# Patient Record
Sex: FEMALE | Race: WHITE | NOT HISPANIC OR LATINO | Employment: FULL TIME | URBAN - METROPOLITAN AREA
[De-identification: names, ages, dates, MRNs, and addresses within clinical notes are randomized per-mention and may not be internally consistent; named-entity substitution may affect disease eponyms.]

---

## 2017-01-19 ENCOUNTER — OFFICE VISIT (OUTPATIENT)
Dept: URGENT CARE | Facility: CLINIC | Age: 37
End: 2017-01-19
Payer: COMMERCIAL

## 2017-01-19 PROCEDURE — 93005 ELECTROCARDIOGRAM TRACING: CPT

## 2017-01-19 PROCEDURE — 99214 OFFICE O/P EST MOD 30 MIN: CPT

## 2017-04-21 ENCOUNTER — TRANSCRIBE ORDERS (OUTPATIENT)
Dept: URGENT CARE | Facility: CLINIC | Age: 37
End: 2017-04-21

## 2017-04-21 ENCOUNTER — APPOINTMENT (OUTPATIENT)
Dept: LAB | Facility: CLINIC | Age: 37
End: 2017-04-21
Attending: EMERGENCY MEDICINE

## 2017-04-21 DIAGNOSIS — Z02.1 PHYSICAL EXAM, PRE-EMPLOYMENT: Primary | ICD-10-CM

## 2017-04-21 DIAGNOSIS — Z02.1 PHYSICAL EXAM, PRE-EMPLOYMENT: ICD-10-CM

## 2017-04-21 PROCEDURE — 86480 TB TEST CELL IMMUN MEASURE: CPT

## 2017-04-21 PROCEDURE — 36415 COLL VENOUS BLD VENIPUNCTURE: CPT

## 2017-04-23 LAB
ANNOTATION COMMENT IMP: NORMAL
GAMMA INTERFERON BACKGROUND BLD IA-ACNC: 0.03 IU/ML
M TB IFN-G BLD-IMP: NEGATIVE
M TB IFN-G CD4+ BCKGRND COR BLD-ACNC: 0.03 IU/ML
M TB IFN-G CD4+ T-CELLS BLD-ACNC: 0.06 IU/ML
MITOGEN IGNF BLD-ACNC: >10 IU/ML
QUANTIFERON-TB GOLD IN TUBE: NORMAL
SERVICE CMNT-IMP: NORMAL

## 2017-05-30 ENCOUNTER — TRANSCRIBE ORDERS (OUTPATIENT)
Dept: URGENT CARE | Facility: CLINIC | Age: 37
End: 2017-05-30

## 2017-05-30 ENCOUNTER — HOSPITAL ENCOUNTER (OUTPATIENT)
Dept: RADIOLOGY | Facility: CLINIC | Age: 37
Discharge: HOME | End: 2017-05-30
Payer: COMMERCIAL

## 2017-05-30 DIAGNOSIS — M79.671 RIGHT FOOT PAIN: ICD-10-CM

## 2017-05-30 DIAGNOSIS — M79.672 LEFT FOOT PAIN: Primary | ICD-10-CM

## 2017-05-30 DIAGNOSIS — M79.672 LEFT FOOT PAIN: ICD-10-CM

## 2017-05-30 PROCEDURE — 73630 X-RAY EXAM OF FOOT: CPT

## 2017-05-30 PROCEDURE — 73650 X-RAY EXAM OF HEEL: CPT

## 2017-07-31 ENCOUNTER — APPOINTMENT (OUTPATIENT)
Dept: LAB | Facility: CLINIC | Age: 37
End: 2017-07-31
Payer: COMMERCIAL

## 2017-07-31 ENCOUNTER — TRANSCRIBE ORDERS (OUTPATIENT)
Dept: LAB | Facility: CLINIC | Age: 37
End: 2017-07-31

## 2017-07-31 DIAGNOSIS — Z00.8 HEALTH EXAMINATION IN POPULATION SURVEY: ICD-10-CM

## 2017-07-31 DIAGNOSIS — Z00.8 HEALTH EXAMINATION IN POPULATION SURVEY: Primary | ICD-10-CM

## 2017-07-31 LAB
CHOLEST SERPL-MCNC: 163 MG/DL (ref 50–200)
EST. AVERAGE GLUCOSE BLD GHB EST-MCNC: 105 MG/DL
HBA1C MFR BLD: 5.3 % (ref 4.2–6.3)
HDLC SERPL-MCNC: 46 MG/DL (ref 40–60)
LDLC SERPL CALC-MCNC: 94 MG/DL (ref 0–100)
TRIGL SERPL-MCNC: 114 MG/DL

## 2017-07-31 PROCEDURE — 83036 HEMOGLOBIN GLYCOSYLATED A1C: CPT

## 2017-07-31 PROCEDURE — 80061 LIPID PANEL: CPT

## 2017-07-31 PROCEDURE — 36415 COLL VENOUS BLD VENIPUNCTURE: CPT

## 2017-08-28 ENCOUNTER — ALLSCRIPTS OFFICE VISIT (OUTPATIENT)
Dept: OTHER | Facility: OTHER | Age: 37
End: 2017-08-28

## 2017-08-28 DIAGNOSIS — R60.0 LOCALIZED EDEMA: ICD-10-CM

## 2017-08-28 DIAGNOSIS — K52.9 NONINFECTIVE GASTROENTERITIS AND COLITIS: ICD-10-CM

## 2017-08-30 ENCOUNTER — TRANSCRIBE ORDERS (OUTPATIENT)
Dept: ADMINISTRATIVE | Facility: HOSPITAL | Age: 37
End: 2017-08-30

## 2017-08-30 DIAGNOSIS — J45.990 EXERCISE INDUCED BRONCHOSPASM: Primary | ICD-10-CM

## 2017-09-05 ENCOUNTER — HOSPITAL ENCOUNTER (OUTPATIENT)
Dept: PULMONOLOGY | Facility: HOSPITAL | Age: 37
Discharge: HOME | End: 2017-09-05
Attending: INTERNAL MEDICINE
Payer: COMMERCIAL

## 2017-09-05 ENCOUNTER — TRANSCRIBE ORDERS (OUTPATIENT)
Dept: PULMONOLOGY | Facility: HOSPITAL | Age: 37
End: 2017-09-05

## 2017-09-05 ENCOUNTER — GENERIC CONVERSION - ENCOUNTER (OUTPATIENT)
Dept: OTHER | Facility: OTHER | Age: 37
End: 2017-09-05

## 2017-09-05 ENCOUNTER — APPOINTMENT (OUTPATIENT)
Dept: LAB | Facility: HOSPITAL | Age: 37
End: 2017-09-05
Attending: INTERNAL MEDICINE
Payer: COMMERCIAL

## 2017-09-05 ENCOUNTER — HOSPITAL ENCOUNTER (OUTPATIENT)
Dept: ULTRASOUND IMAGING | Facility: HOSPITAL | Age: 37
Discharge: HOME | End: 2017-09-05
Attending: INTERNAL MEDICINE
Payer: COMMERCIAL

## 2017-09-05 DIAGNOSIS — J45.990 EXERCISE INDUCED BRONCHOSPASM: ICD-10-CM

## 2017-09-05 DIAGNOSIS — K52.9 NONINFECTIVE GASTROENTERITIS AND COLITIS: ICD-10-CM

## 2017-09-05 DIAGNOSIS — R60.0 LOCALIZED EDEMA: ICD-10-CM

## 2017-09-05 DIAGNOSIS — J45.990 EXERCISE INDUCED BRONCHOSPASM: Primary | ICD-10-CM

## 2017-09-05 LAB
ALBUMIN SERPL BCP-MCNC: 4.3 G/DL (ref 3.5–5)
ALP SERPL-CCNC: 68 U/L (ref 46–116)
ALT SERPL W P-5'-P-CCNC: 20 U/L (ref 12–78)
ANION GAP SERPL CALCULATED.3IONS-SCNC: 9 MMOL/L (ref 4–13)
AST SERPL W P-5'-P-CCNC: 16 U/L (ref 5–45)
BASOPHILS # BLD AUTO: 0.02 THOUSANDS/ΜL (ref 0–0.1)
BASOPHILS NFR BLD AUTO: 0 % (ref 0–1)
BILIRUB SERPL-MCNC: 1.2 MG/DL (ref 0.2–1)
BUN SERPL-MCNC: 7 MG/DL (ref 5–25)
CALCIUM SERPL-MCNC: 8.7 MG/DL (ref 8.3–10.1)
CHLORIDE SERPL-SCNC: 102 MMOL/L (ref 100–108)
CO2 SERPL-SCNC: 26 MMOL/L (ref 21–32)
CREAT SERPL-MCNC: 0.78 MG/DL (ref 0.6–1.3)
DEPRECATED D DIMER PPP: <270 NG/ML (FEU) (ref 0–424)
EOSINOPHIL # BLD AUTO: 0.08 THOUSAND/ΜL (ref 0–0.61)
EOSINOPHIL NFR BLD AUTO: 1 % (ref 0–6)
ERYTHROCYTE [DISTWIDTH] IN BLOOD BY AUTOMATED COUNT: 12.9 % (ref 11.6–15.1)
GFR SERPL CREATININE-BSD FRML MDRD: 99 ML/MIN/1.73SQ M
GLUCOSE SERPL-MCNC: 98 MG/DL (ref 65–140)
HCT VFR BLD AUTO: 42.4 % (ref 34.8–46.1)
HGB BLD-MCNC: 14.3 G/DL (ref 11.5–15.4)
LYMPHOCYTES # BLD AUTO: 2.74 THOUSANDS/ΜL (ref 0.6–4.47)
LYMPHOCYTES NFR BLD AUTO: 24 % (ref 14–44)
MCH RBC QN AUTO: 30.5 PG (ref 26.8–34.3)
MCHC RBC AUTO-ENTMCNC: 33.7 G/DL (ref 31.4–37.4)
MCV RBC AUTO: 90 FL (ref 82–98)
MONOCYTES # BLD AUTO: 0.97 THOUSAND/ΜL (ref 0.17–1.22)
MONOCYTES NFR BLD AUTO: 8 % (ref 4–12)
NEUTROPHILS # BLD AUTO: 7.83 THOUSANDS/ΜL (ref 1.85–7.62)
NEUTS SEG NFR BLD AUTO: 67 % (ref 43–75)
PLATELET # BLD AUTO: 212 THOUSANDS/UL (ref 149–390)
PMV BLD AUTO: 9.4 FL (ref 8.9–12.7)
POTASSIUM SERPL-SCNC: 4.3 MMOL/L (ref 3.5–5.3)
PROT SERPL-MCNC: 6.9 G/DL (ref 6.4–8.2)
RBC # BLD AUTO: 4.69 MILLION/UL (ref 3.81–5.12)
SODIUM SERPL-SCNC: 137 MMOL/L (ref 136–145)
TSH SERPL DL<=0.05 MIU/L-ACNC: 2.01 UIU/ML (ref 0.36–3.74)
WBC # BLD AUTO: 11.64 THOUSAND/UL (ref 4.31–10.16)

## 2017-09-05 PROCEDURE — 80053 COMPREHEN METABOLIC PANEL: CPT

## 2017-09-05 PROCEDURE — 94760 N-INVAS EAR/PLS OXIMETRY 1: CPT

## 2017-09-05 PROCEDURE — 82784 ASSAY IGA/IGD/IGG/IGM EACH: CPT

## 2017-09-05 PROCEDURE — 94010 BREATHING CAPACITY TEST: CPT

## 2017-09-05 PROCEDURE — 76830 TRANSVAGINAL US NON-OB: CPT

## 2017-09-05 PROCEDURE — 83516 IMMUNOASSAY NONANTIBODY: CPT

## 2017-09-05 PROCEDURE — 85025 COMPLETE CBC W/AUTO DIFF WBC: CPT

## 2017-09-05 PROCEDURE — 36415 COLL VENOUS BLD VENIPUNCTURE: CPT

## 2017-09-05 PROCEDURE — 86255 FLUORESCENT ANTIBODY SCREEN: CPT

## 2017-09-05 PROCEDURE — 76856 US EXAM PELVIC COMPLETE: CPT

## 2017-09-05 PROCEDURE — 84443 ASSAY THYROID STIM HORMONE: CPT

## 2017-09-05 PROCEDURE — 85379 FIBRIN DEGRADATION QUANT: CPT

## 2017-09-05 RX ORDER — ALBUTEROL SULFATE 2.5 MG/3ML
2.5 SOLUTION RESPIRATORY (INHALATION) ONCE AS NEEDED
Status: DISCONTINUED | OUTPATIENT
Start: 2017-09-05 | End: 2017-09-09 | Stop reason: HOSPADM

## 2017-09-06 ENCOUNTER — GENERIC CONVERSION - ENCOUNTER (OUTPATIENT)
Dept: OTHER | Facility: OTHER | Age: 37
End: 2017-09-06

## 2017-09-06 LAB
ENDOMYSIUM IGA SER QL: NEGATIVE
GLIADIN PEPTIDE IGA SER-ACNC: 4 UNITS (ref 0–19)
GLIADIN PEPTIDE IGG SER-ACNC: 2 UNITS (ref 0–19)
IGA SERPL-MCNC: 219 MG/DL (ref 87–352)
TTG IGA SER-ACNC: <2 U/ML (ref 0–3)
TTG IGG SER-ACNC: <2 U/ML (ref 0–5)

## 2017-09-07 ENCOUNTER — GENERIC CONVERSION - ENCOUNTER (OUTPATIENT)
Dept: OTHER | Facility: OTHER | Age: 37
End: 2017-09-07

## 2017-09-15 ENCOUNTER — GENERIC CONVERSION - ENCOUNTER (OUTPATIENT)
Dept: OTHER | Facility: OTHER | Age: 37
End: 2017-09-15

## 2017-09-18 PROCEDURE — G0145 SCR C/V CYTO,THINLAYER,RESCR: HCPCS | Performed by: SPECIALIST

## 2017-09-20 ENCOUNTER — LAB REQUISITION (OUTPATIENT)
Dept: LAB | Facility: HOSPITAL | Age: 37
End: 2017-09-20
Payer: COMMERCIAL

## 2017-09-20 ENCOUNTER — TRANSCRIBE ORDERS (OUTPATIENT)
Dept: ADMINISTRATIVE | Facility: HOSPITAL | Age: 37
End: 2017-09-20

## 2017-09-20 DIAGNOSIS — Z01.419 ENCOUNTER FOR GYNECOLOGICAL EXAMINATION WITHOUT ABNORMAL FINDING: ICD-10-CM

## 2017-09-20 DIAGNOSIS — N83.201 CYST OF RIGHT OVARY: Primary | ICD-10-CM

## 2017-09-25 LAB
LAB AP GYN PRIMARY INTERPRETATION: NORMAL
Lab: NORMAL

## 2017-10-17 ENCOUNTER — HOSPITAL ENCOUNTER (OUTPATIENT)
Dept: ULTRASOUND IMAGING | Facility: HOSPITAL | Age: 37
Discharge: HOME | End: 2017-10-17
Payer: COMMERCIAL

## 2017-10-17 DIAGNOSIS — N83.201 CYST OF RIGHT OVARY: ICD-10-CM

## 2017-10-17 PROCEDURE — 76856 US EXAM PELVIC COMPLETE: CPT

## 2017-10-17 PROCEDURE — 76830 TRANSVAGINAL US NON-OB: CPT

## 2017-11-28 ENCOUNTER — TRANSCRIBE ORDERS (OUTPATIENT)
Dept: LAB | Facility: CLINIC | Age: 37
End: 2017-11-28

## 2017-11-28 ENCOUNTER — APPOINTMENT (OUTPATIENT)
Dept: LAB | Facility: CLINIC | Age: 37
End: 2017-11-28
Payer: COMMERCIAL

## 2017-11-28 DIAGNOSIS — Z31.41 FERTILITY TESTING: ICD-10-CM

## 2017-11-28 DIAGNOSIS — E07.9 DISORDER OF THYROID GLAND: ICD-10-CM

## 2017-11-28 DIAGNOSIS — E34.9: ICD-10-CM

## 2017-11-28 DIAGNOSIS — Z11.59 SCREENING FOR VIRAL DISEASE: ICD-10-CM

## 2017-11-28 DIAGNOSIS — Z11.8 SCREENING EXAMINATION FOR PARASITIC INFECTION: ICD-10-CM

## 2017-11-28 DIAGNOSIS — D64.9 ANEMIA, UNSPECIFIED TYPE: ICD-10-CM

## 2017-11-28 DIAGNOSIS — Z22.4 CARRIER OF INFECTIONS WITH PREDOMINANTLY SEXUAL MODE OF TRANSMISSION: ICD-10-CM

## 2017-11-28 DIAGNOSIS — Z11.3 ENCOUNTER FOR SCREENING FOR INFECTIONS WITH A PREDOMINANTLY SEXUAL MODE OF TRANSMISSION: ICD-10-CM

## 2017-11-28 DIAGNOSIS — R76.0 RAISED ANTIBODY TITER: Primary | ICD-10-CM

## 2017-11-28 DIAGNOSIS — R76.0 RAISED ANTIBODY TITER: ICD-10-CM

## 2017-11-28 DIAGNOSIS — Z01.83 ENCOUNTER FOR BLOOD TYPING: ICD-10-CM

## 2017-11-28 DIAGNOSIS — G73.7: ICD-10-CM

## 2017-11-28 LAB
BASOPHILS # BLD AUTO: 0.02 THOUSANDS/ΜL (ref 0–0.1)
BASOPHILS NFR BLD AUTO: 0 % (ref 0–1)
EOSINOPHIL # BLD AUTO: 0.1 THOUSAND/ΜL (ref 0–0.61)
EOSINOPHIL NFR BLD AUTO: 1 % (ref 0–6)
ERYTHROCYTE [DISTWIDTH] IN BLOOD BY AUTOMATED COUNT: 12.9 % (ref 11.6–15.1)
HCT VFR BLD AUTO: 39.9 % (ref 34.8–46.1)
HGB BLD-MCNC: 13.6 G/DL (ref 11.5–15.4)
LYMPHOCYTES # BLD AUTO: 2.68 THOUSANDS/ΜL (ref 0.6–4.47)
LYMPHOCYTES NFR BLD AUTO: 32 % (ref 14–44)
MCH RBC QN AUTO: 30.8 PG (ref 26.8–34.3)
MCHC RBC AUTO-ENTMCNC: 34.1 G/DL (ref 31.4–37.4)
MCV RBC AUTO: 90 FL (ref 82–98)
MONOCYTES # BLD AUTO: 0.73 THOUSAND/ΜL (ref 0.17–1.22)
MONOCYTES NFR BLD AUTO: 9 % (ref 4–12)
NEUTROPHILS # BLD AUTO: 4.83 THOUSANDS/ΜL (ref 1.85–7.62)
NEUTS SEG NFR BLD AUTO: 58 % (ref 43–75)
NRBC BLD AUTO-RTO: 0 /100 WBCS
PLATELET # BLD AUTO: 229 THOUSANDS/UL (ref 149–390)
PMV BLD AUTO: 10 FL (ref 8.9–12.7)
PROLACTIN SERPL-MCNC: 24.7 NG/ML
RBC # BLD AUTO: 4.42 MILLION/UL (ref 3.81–5.12)
RUBV IGG SERPL IA-ACNC: 70.5 IU/ML
T3FREE SERPL-MCNC: 2.35 PG/ML (ref 2.3–4.2)
T4 FREE SERPL-MCNC: 0.89 NG/DL (ref 0.76–1.46)
TSH SERPL DL<=0.05 MIU/L-ACNC: 2.28 UIU/ML (ref 0.36–3.74)
WBC # BLD AUTO: 8.38 THOUSAND/UL (ref 4.31–10.16)

## 2017-11-28 PROCEDURE — 87491 CHLMYD TRACH DNA AMP PROBE: CPT

## 2017-11-28 PROCEDURE — 86645 CMV ANTIBODY IGM: CPT

## 2017-11-28 PROCEDURE — 86803 HEPATITIS C AB TEST: CPT

## 2017-11-28 PROCEDURE — 86631 CHLAMYDIA ANTIBODY: CPT

## 2017-11-28 PROCEDURE — 86704 HEP B CORE ANTIBODY TOTAL: CPT

## 2017-11-28 PROCEDURE — 87591 N.GONORRHOEAE DNA AMP PROB: CPT

## 2017-11-28 PROCEDURE — 86787 VARICELLA-ZOSTER ANTIBODY: CPT

## 2017-11-28 PROCEDURE — 84439 ASSAY OF FREE THYROXINE: CPT

## 2017-11-28 PROCEDURE — 84146 ASSAY OF PROLACTIN: CPT

## 2017-11-28 PROCEDURE — 86790 VIRUS ANTIBODY NOS: CPT

## 2017-11-28 PROCEDURE — 86632 CHLAMYDIA IGM ANTIBODY: CPT

## 2017-11-28 PROCEDURE — 83516 IMMUNOASSAY NONANTIBODY: CPT

## 2017-11-28 PROCEDURE — 80081 OBSTETRIC PANEL INC HIV TSTG: CPT

## 2017-11-28 PROCEDURE — 84481 FREE ASSAY (FT-3): CPT

## 2017-11-28 PROCEDURE — 36415 COLL VENOUS BLD VENIPUNCTURE: CPT

## 2017-11-28 PROCEDURE — 86644 CMV ANTIBODY: CPT

## 2017-11-28 PROCEDURE — 87661 TRICHOMONAS VAGINALIS AMPLIF: CPT

## 2017-11-28 PROCEDURE — 86705 HEP B CORE ANTIBODY IGM: CPT

## 2017-11-28 PROCEDURE — 84443 ASSAY THYROID STIM HORMONE: CPT

## 2017-11-29 LAB
CHLAMYDIA DNA CVX QL NAA+PROBE: NORMAL
CMV IGG SERPL IA-ACNC: <0.6 U/ML (ref 0–0.59)
CMV IGM SERPL IA-ACNC: <30 AU/ML (ref 0–29.9)
HBV CORE AB SER QL: NORMAL
HBV CORE IGM SER QL: NORMAL
HBV SURFACE AG SER QL: NORMAL
HCV AB SER QL: NORMAL
HIV 1+2 AB+HIV1 P24 AG SERPL QL IA: NORMAL
HTLV I+II AB SER QL IA: NEGATIVE
N GONORRHOEA DNA GENITAL QL NAA+PROBE: NORMAL
RPR SER QL: NORMAL

## 2017-11-30 LAB
T VAGINALIS RRNA SPEC QL NAA+PROBE: NEGATIVE
VZV IGG SER IA-ACNC: NORMAL

## 2017-12-01 LAB
C TRACH IGM TITR SER IF: <0.8 INDEX (ref 0–0.7)
CHLAMYDIA IGG SER-ACNC: <0.91 RATIO (ref 0–0.9)
MIS SERPL-MCNC: 0.47 NG/ML

## 2018-01-03 ENCOUNTER — LAB REQUISITION (OUTPATIENT)
Dept: LAB | Facility: HOSPITAL | Age: 38
End: 2018-01-03
Payer: COMMERCIAL

## 2018-01-03 DIAGNOSIS — E07.9 DISORDER OF THYROID: ICD-10-CM

## 2018-01-03 LAB
T3FREE SERPL-MCNC: 2.67 PG/ML (ref 2.3–4.2)
T4 FREE SERPL-MCNC: 0.87 NG/DL (ref 0.76–1.46)
TSH SERPL DL<=0.05 MIU/L-ACNC: 3.67 UIU/ML (ref 0.36–3.74)

## 2018-01-03 PROCEDURE — 84439 ASSAY OF FREE THYROXINE: CPT | Performed by: OBSTETRICS & GYNECOLOGY

## 2018-01-03 PROCEDURE — 84481 FREE ASSAY (FT-3): CPT | Performed by: OBSTETRICS & GYNECOLOGY

## 2018-01-03 PROCEDURE — 84443 ASSAY THYROID STIM HORMONE: CPT | Performed by: OBSTETRICS & GYNECOLOGY

## 2018-01-09 PROCEDURE — 88305 TISSUE EXAM BY PATHOLOGIST: CPT | Performed by: OBSTETRICS & GYNECOLOGY

## 2018-01-10 NOTE — RESULT NOTES
Verified Results  * US PELVIS COMPLETE [facility] [place] AND TRANSVAGINAL) [id number] 12:31PM Art Siddharth Order Number: [id number]    - Patient Instructions: To schedule this appointment, please contact Central Scheduling at [phone]  Test Name Result Flag Reference   US PELVIS COMPLETE (TRANSABDOMINAL AND TRANSVAGINAL) (Report)     This is a summary report  The complete report is available in the patient's medical record  If you cannot access the medical record, please contact the sending organization for a detailed fax or copy  PELVIC ULTRASOUND, COMPLETE     INDICATION: 61-year-old woman with in the lateral left leg edema  Clinical concern for a pelvic lesion causing left leg edema  LMP was 2 weeks prior  COMPARISON: None  TECHNIQUE:  Transabdominal pelvic ultrasound was performed in sagittal and transverse planes with a curvilinear transducer  Additional transvaginal imaging was performed to better evaluate the endometrium and ovaries  Imaging included volumetric    sweeps as well as traditional still imaging technique  FINDINGS:     UTERUS:   The uterus is anteverted in position, measuring 7 4 x 3 2 x 4 2 cm  Contour and echotexture appear normal      The cervix shows no suspicious abnormality  ENDOMETRIUM:    Normal caliber of 8 mm  Homogenous and normal in appearance  OVARIES/ADNEXA:   Right ovary: 2 6 x 3 0 x 2 5 cm  Probable right ovarian hemorrhagic cyst measuring 2 4 x 2 0 x 2 4 cm  However, the background echogenicity is slightly greater than usual    Doppler flow within normal limits  Left ovary: 3 4 x 1 3 x 2 6 cm  No suspicious left ovarian abnormality  Doppler flow within normal limits  No suspicious adnexal mass or loculated collections  There is small amount of simple free fluid in the pelvis, likely physiologic in this premenopausal female  IMPRESSION:     1  2 4 cm probable hemorrhagic cyst in the right ovary   However as the background echogenicity is slightly greater than usual, would recommend 6 week follow-up pelvic ultrasound to document expected evolution or resolution  2  Otherwise unremarkable pelvic ultrasound  3  No findings to explain unilateral left leg edema     ##[id number]##[id number]        Workstation performed: [id number]     Signed by:   José Miguel Stark MD   9/6/17

## 2018-01-11 ENCOUNTER — LAB REQUISITION (OUTPATIENT)
Dept: LAB | Facility: HOSPITAL | Age: 38
End: 2018-01-11
Payer: COMMERCIAL

## 2018-01-11 DIAGNOSIS — N71.1 CHRONIC INFLAMMATORY DISEASE OF UTERUS: ICD-10-CM

## 2018-01-11 NOTE — RESULT NOTES
Verified Results  (1) CELIAC DISEASE AB PROFILE [id number] 01:04PM Jose Francisco Larson Order Number: [id number]     Test Name Result Flag Reference   tTG IGG <2 U/mL  0 - 5   Negative        0 - 5                                Weak Positive   6 - 9                                Positive           >9   tTG IGA <2 U/mL  0 - 3   Negative        0 -  3                                Weak Positive   4 - 10                                Positive           >10   Tissue Transglutaminase (tTG) has been identified   as the endomysial antigen  Studies have demonstr-   ated that endomysial IgA antibodies have over 99%   specificity for gluten sensitive enteropathy     GLIADA 4 units  0 - 19   Negative                   0 - 19                     Weak Positive             20 - 30                     Moderate to Strong Positive   >30   GLIADG 2 units  0 - 19   Negative                   0 - 19                     Weak Positive             20 - 30                     Moderate to Strong Positive   >30   ENDOMYSIAL AB IGA Negative  Negative   Performed at:  [address] [place] [address]  [id number]  : Selina Charles MD, Phone:  [phone]    mg/dL  [phone]  352

## 2018-01-13 VITALS
HEART RATE: 68 BPM | TEMPERATURE: 97.4 F | RESPIRATION RATE: 16 BRPM | DIASTOLIC BLOOD PRESSURE: 64 MMHG | HEIGHT: 68 IN | SYSTOLIC BLOOD PRESSURE: 118 MMHG | WEIGHT: 121 LBS | BODY MASS INDEX: 18.34 KG/M2

## 2018-01-22 VITALS
HEIGHT: 68 IN | DIASTOLIC BLOOD PRESSURE: 68 MMHG | SYSTOLIC BLOOD PRESSURE: 110 MMHG | BODY MASS INDEX: 18.04 KG/M2 | TEMPERATURE: 98.6 F | HEART RATE: 76 BPM | WEIGHT: 119 LBS

## 2018-06-07 ENCOUNTER — APPOINTMENT (OUTPATIENT)
Dept: LAB | Facility: CLINIC | Age: 38
End: 2018-06-07
Payer: COMMERCIAL

## 2018-06-07 ENCOUNTER — TRANSCRIBE ORDERS (OUTPATIENT)
Dept: LAB | Facility: CLINIC | Age: 38
End: 2018-06-07

## 2018-06-07 DIAGNOSIS — Z00.8 HEALTH EXAMINATION IN POPULATION SURVEY: ICD-10-CM

## 2018-06-07 DIAGNOSIS — Z00.8 HEALTH EXAMINATION IN POPULATION SURVEY: Primary | ICD-10-CM

## 2018-06-07 LAB
CHOLEST SERPL-MCNC: 159 MG/DL (ref 50–200)
EST. AVERAGE GLUCOSE BLD GHB EST-MCNC: 100 MG/DL
HBA1C MFR BLD: 5.1 % (ref 4.2–6.3)
HDLC SERPL-MCNC: 43 MG/DL (ref 40–60)
LDLC SERPL CALC-MCNC: 89 MG/DL (ref 0–100)
NONHDLC SERPL-MCNC: 116 MG/DL
TRIGL SERPL-MCNC: 134 MG/DL

## 2018-06-07 PROCEDURE — 83036 HEMOGLOBIN GLYCOSYLATED A1C: CPT

## 2018-06-07 PROCEDURE — 36415 COLL VENOUS BLD VENIPUNCTURE: CPT

## 2018-06-07 PROCEDURE — 80061 LIPID PANEL: CPT

## 2018-07-02 ENCOUNTER — TRANSCRIBE ORDERS (OUTPATIENT)
Dept: LAB | Facility: CLINIC | Age: 38
End: 2018-07-02

## 2018-07-02 ENCOUNTER — APPOINTMENT (OUTPATIENT)
Dept: LAB | Facility: CLINIC | Age: 38
End: 2018-07-02
Payer: COMMERCIAL

## 2018-07-02 DIAGNOSIS — K14.0 GLOSSITIS: Primary | ICD-10-CM

## 2018-07-02 DIAGNOSIS — K14.0 GLOSSITIS: ICD-10-CM

## 2018-07-02 LAB
25(OH)D3 SERPL-MCNC: 50.4 NG/ML (ref 30–100)
BASOPHILS # BLD AUTO: 0.02 THOUSANDS/ΜL (ref 0–0.1)
BASOPHILS NFR BLD AUTO: 0 % (ref 0–1)
EOSINOPHIL # BLD AUTO: 0.12 THOUSAND/ΜL (ref 0–0.61)
EOSINOPHIL NFR BLD AUTO: 2 % (ref 0–6)
ERYTHROCYTE [DISTWIDTH] IN BLOOD BY AUTOMATED COUNT: 12.5 % (ref 11.6–15.1)
FOLATE SERPL-MCNC: >20 NG/ML (ref 3.1–17.5)
HCT VFR BLD AUTO: 42.2 % (ref 34.8–46.1)
HGB BLD-MCNC: 14 G/DL (ref 11.5–15.4)
IMM GRANULOCYTES # BLD AUTO: 0.01 THOUSAND/UL (ref 0–0.2)
IMM GRANULOCYTES NFR BLD AUTO: 0 % (ref 0–2)
LYMPHOCYTES # BLD AUTO: 1.63 THOUSANDS/ΜL (ref 0.6–4.47)
LYMPHOCYTES NFR BLD AUTO: 25 % (ref 14–44)
MCH RBC QN AUTO: 31.3 PG (ref 26.8–34.3)
MCHC RBC AUTO-ENTMCNC: 33.2 G/DL (ref 31.4–37.4)
MCV RBC AUTO: 94 FL (ref 82–98)
MONOCYTES # BLD AUTO: 0.69 THOUSAND/ΜL (ref 0.17–1.22)
MONOCYTES NFR BLD AUTO: 11 % (ref 4–12)
NEUTROPHILS # BLD AUTO: 4.04 THOUSANDS/ΜL (ref 1.85–7.62)
NEUTS SEG NFR BLD AUTO: 62 % (ref 43–75)
NRBC BLD AUTO-RTO: 0 /100 WBCS
PLATELET # BLD AUTO: 202 THOUSANDS/UL (ref 149–390)
PMV BLD AUTO: 10.4 FL (ref 8.9–12.7)
RBC # BLD AUTO: 4.48 MILLION/UL (ref 3.81–5.12)
VIT B12 SERPL-MCNC: 395 PG/ML (ref 100–900)
WBC # BLD AUTO: 6.51 THOUSAND/UL (ref 4.31–10.16)

## 2018-07-02 PROCEDURE — 36415 COLL VENOUS BLD VENIPUNCTURE: CPT

## 2018-07-02 PROCEDURE — 85025 COMPLETE CBC W/AUTO DIFF WBC: CPT

## 2018-07-02 PROCEDURE — 82306 VITAMIN D 25 HYDROXY: CPT

## 2018-07-02 PROCEDURE — 82607 VITAMIN B-12: CPT

## 2018-07-02 PROCEDURE — 82746 ASSAY OF FOLIC ACID SERUM: CPT

## 2018-09-24 ENCOUNTER — HOSPITAL ENCOUNTER (EMERGENCY)
Facility: HOSPITAL | Age: 38
Discharge: HOME | End: 2018-09-24
Attending: FAMILY MEDICINE
Payer: COMMERCIAL

## 2018-09-24 ENCOUNTER — APPOINTMENT (EMERGENCY)
Dept: CT IMAGING | Facility: HOSPITAL | Age: 38
End: 2018-09-24
Payer: COMMERCIAL

## 2018-09-24 VITALS
WEIGHT: 130 LBS | HEIGHT: 68 IN | HEART RATE: 66 BPM | DIASTOLIC BLOOD PRESSURE: 60 MMHG | TEMPERATURE: 97.7 F | BODY MASS INDEX: 19.7 KG/M2 | SYSTOLIC BLOOD PRESSURE: 119 MMHG | OXYGEN SATURATION: 100 % | RESPIRATION RATE: 14 BRPM

## 2018-09-24 DIAGNOSIS — G43.909 MIGRAINE: Primary | ICD-10-CM

## 2018-09-24 PROCEDURE — 96375 TX/PRO/DX INJ NEW DRUG ADDON: CPT

## 2018-09-24 PROCEDURE — 99284 EMERGENCY DEPT VISIT MOD MDM: CPT

## 2018-09-24 PROCEDURE — 96374 THER/PROPH/DIAG INJ IV PUSH: CPT

## 2018-09-24 PROCEDURE — 96361 HYDRATE IV INFUSION ADD-ON: CPT

## 2018-09-24 PROCEDURE — 96376 TX/PRO/DX INJ SAME DRUG ADON: CPT

## 2018-09-24 PROCEDURE — 70450 CT HEAD/BRAIN W/O DYE: CPT

## 2018-09-24 RX ORDER — CHOLESTYRAMINE LIGHT 4 G/5.7G
4 POWDER, FOR SUSPENSION ORAL AS NEEDED
COMMUNITY
End: 2018-12-24

## 2018-09-24 RX ORDER — KETOROLAC TROMETHAMINE 30 MG/ML
30 INJECTION, SOLUTION INTRAMUSCULAR; INTRAVENOUS ONCE
Status: COMPLETED | OUTPATIENT
Start: 2018-09-24 | End: 2018-09-24

## 2018-09-24 RX ORDER — ONDANSETRON 2 MG/ML
4 INJECTION INTRAMUSCULAR; INTRAVENOUS ONCE
Status: COMPLETED | OUTPATIENT
Start: 2018-09-24 | End: 2018-09-24

## 2018-09-24 RX ORDER — DIPHENHYDRAMINE HYDROCHLORIDE 50 MG/ML
25 INJECTION INTRAMUSCULAR; INTRAVENOUS ONCE
Status: COMPLETED | OUTPATIENT
Start: 2018-09-24 | End: 2018-09-24

## 2018-09-24 RX ORDER — MEPERIDINE HYDROCHLORIDE 50 MG/ML
25 INJECTION INTRAMUSCULAR; INTRAVENOUS; SUBCUTANEOUS ONCE
Status: COMPLETED | OUTPATIENT
Start: 2018-09-24 | End: 2018-09-24

## 2018-09-24 RX ORDER — SUMATRIPTAN 100 MG/1
100 TABLET, FILM COATED ORAL ONCE AS NEEDED
COMMUNITY
End: 2018-09-27 | Stop reason: SDUPTHER

## 2018-09-24 RX ORDER — SUMATRIPTAN 100 MG/1
100 TABLET, FILM COATED ORAL ONCE AS NEEDED
Qty: 30 TABLET | Refills: 1 | Status: SHIPPED | OUTPATIENT
Start: 2018-09-24 | End: 2018-10-01

## 2018-09-24 RX ADMIN — KETOROLAC TROMETHAMINE 30 MG: 30 INJECTION, SOLUTION INTRAMUSCULAR at 04:58

## 2018-09-24 RX ADMIN — ONDANSETRON 4 MG: 2 INJECTION INTRAMUSCULAR; INTRAVENOUS at 04:11

## 2018-09-24 RX ADMIN — DIPHENHYDRAMINE HYDROCHLORIDE 25 MG: 50 INJECTION INTRAMUSCULAR; INTRAVENOUS at 04:10

## 2018-09-24 RX ADMIN — MEPERIDINE HYDROCHLORIDE 25 MG: 50 INJECTION INTRAMUSCULAR; INTRAVENOUS; SUBCUTANEOUS at 06:12

## 2018-09-24 RX ADMIN — KETOROLAC TROMETHAMINE 30 MG: 30 INJECTION, SOLUTION INTRAMUSCULAR; INTRAVENOUS at 04:10

## 2018-09-24 RX ADMIN — SODIUM CHLORIDE 1000 ML: 0.9 INJECTION, SOLUTION INTRAVENOUS at 04:11

## 2018-09-24 RX ADMIN — DEXAMETHASONE SODIUM PHOSPHATE 10 MG: 10 INJECTION INTRAMUSCULAR; INTRAVENOUS at 04:11

## 2018-09-24 NOTE — ED NOTES
Pt reports nausea gone  Pain is the same  Dr Sherry Nam notified       Renetta Maurice, RN  09/24/18 9299

## 2018-09-24 NOTE — ED PROVIDER NOTES
History  Chief Complaint   Patient presents with    Migraine     Pt has hx of migraine  Pt states she takes imitrex and benadryl which resolves it in 20 mins  Pt states tonight around 2100 she had a HA which has not resolved  Around 2230 pt began having vision problems in RT eye and problems with handling secretions on her left side  Pt states "this isn't like my migraines " Dr Sergey Sweet notified  History provided by:  Patient   used: No    Migraine   Location:  Right scalp  Severity:  Severe  Onset quality:  Sudden  Duration:  5 hours  Timing:  Constant  Progression:  Worsening  Chronicity:  New  Relieved by:  Nothing   Worsened by:  Nothing   Ineffective treatments:  Imitrex and Benadryl  Associated symptoms: headaches    Associated symptoms: no chest pain, no congestion, no cough, no diarrhea, no ear pain, no fever, no myalgias, no nausea, no rhinorrhea, no sore throat, no vomiting and no wheezing        Prior to Admission Medications   Prescriptions Last Dose Informant Patient Reported? Taking? SUMAtriptan (IMITREX) 100 mg tablet   Yes Yes   Sig: Take 100 mg by mouth once as needed for migraine   cholestyramine sugar free (QUESTRAN LIGHT) 4 g packet   Yes Yes   Sig: Take 4 g by mouth as needed      Facility-Administered Medications: None       Past Medical History:   Diagnosis Date    Migraine        Past Surgical History:   Procedure Laterality Date    APPENDECTOMY      TONSILLECTOMY         History reviewed  No pertinent family history  I have reviewed and agree with the history as documented  Social History   Substance Use Topics    Smoking status: Never Smoker    Smokeless tobacco: Never Used    Alcohol use No        Review of Systems   Constitutional: Negative for fever  HENT: Negative for congestion, ear pain, rhinorrhea and sore throat  Difficulty seeing on the right side   Respiratory: Negative for cough and wheezing      Cardiovascular: Negative for chest pain    Gastrointestinal: Negative for diarrhea, nausea and vomiting  Genitourinary: Negative  Musculoskeletal: Negative for myalgias  Neurological: Positive for headaches  Physical Exam  Physical Exam   Constitutional: She is oriented to person, place, and time  She appears well-developed and well-nourished  HENT:   Head: Normocephalic and atraumatic  Eyes: Conjunctivae and EOM are normal  Pupils are equal, round, and reactive to light  Neck: Normal range of motion  Neck supple  Cardiovascular: Normal rate, regular rhythm and normal heart sounds  Pulmonary/Chest: Effort normal and breath sounds normal    Abdominal: Soft  Bowel sounds are normal    Neurological: She is alert and oriented to person, place, and time  Skin: Skin is warm  Psychiatric: She has a normal mood and affect  Her behavior is normal  Judgment and thought content normal    Nursing note and vitals reviewed        Vital Signs  ED Triage Vitals   Temperature Pulse Respirations Blood Pressure SpO2   09/24/18 0333 09/24/18 0333 09/24/18 0333 09/24/18 0333 09/24/18 0333   97 7 °F (36 5 °C) 68 18 135/67 99 %      Temp Source Heart Rate Source Patient Position - Orthostatic VS BP Location FiO2 (%)   09/24/18 0333 -- -- 09/24/18 0333 --   Temporal   Left arm       Pain Score       09/24/18 0458       7           Vitals:    09/24/18 0333   BP: 135/67   Pulse: 68       Visual Acuity      ED Medications  Medications   ketorolac (TORADOL) injection 30 mg (30 mg Intravenous Given 9/24/18 0410)   diphenhydrAMINE (BENADRYL) injection 25 mg (25 mg Intravenous Given 9/24/18 0410)   ondansetron (ZOFRAN) injection 4 mg (4 mg Intravenous Given 9/24/18 0411)   dexamethasone (DECADRON) injection 10 mg (10 mg Intravenous Given 9/24/18 0411)   sodium chloride 0 9 % bolus 1,000 mL (1,000 mL Intravenous New Bag 9/24/18 0411)   ketorolac (TORADOL) injection 30 mg (30 mg Intravenous Given 9/24/18 0458)   meperidine (DEMEROL) injection 25 mg (25 mg Intravenous Given 9/24/18 0612)       Diagnostic Studies  Results Reviewed     None                 CT head without contrast    (Results Pending)              Procedures  Procedures       Phone Contacts  ED Phone Contact    ED Course  ED Course as of Sep 24 0641   Mon Sep 24, 2018   0529 Patient states she is feeling somewhat better nausea has improved patient is given Toradol will continue to observe if no change will tried for medication  6600 Patient states her headache has improved denies any throbbing nausea or vomiting this time  Requesting to be discharged home  Advised to continue taking her medication                      Stroke Assessment     Row Name 09/24/18 0340             NIH Stroke Scale    Interval Baseline      Level of Consciousness (1a ) 0      LOC Questions (1b ) 0      LOC Commands (1c ) 0      Best Gaze (2 ) 0      Visual (3 ) 0      Facial Palsy (4 ) 0      Motor Arm, Left (5a ) 0      Motor Arm, Right (5b ) 0      Motor Leg, Left (6a ) 0      Motor Leg, Right (6b ) 0      Limb Ataxia (7 ) 0      Sensory (8 ) 0      Best Language (9 ) 0      Dysarthria (10 ) 0      Extinction and Inattention (11 ) (Formerly Neglect) 0      Total 0                          MDM  CritCare Time    Disposition  Final diagnoses:   Migraine     Time reflects when diagnosis was documented in both MDM as applicable and the Disposition within this note     Time User Action Codes Description Comment    9/24/2018  3:41 AM Alfred Phoenix Add [G43 959] Migraine       ED Disposition     None      Follow-up Information     Follow up With Specialties Details Why Contact Roderick Coon, DO Internal Medicine In 2 days If symptoms worsen [address]  [phone]            Patient's Medications   Discharge Prescriptions    SUMATRIPTAN (IMITREX) 100 MG TABLET    Take 1 tablet (100 mg total) by mouth once as needed for migraine for up to 1 dose       Start Date: 9/24/2018 End Date: --       Order Dose: 100 mg       Quantity: 30 tablet    Refills: 1     No discharge procedures on file      ED Provider  Electronically Signed by           Octavia Monreal MD  09/24/18 0046

## 2018-09-24 NOTE — ED NOTES
Per Dr Susy Coleman, pt transported to CT at this time prior to being medicated  CT Leonel instructed by Dr Susy Coleman to make CT SuperSTAT       Isabel Luciano RN  09/24/18 3205

## 2018-09-24 NOTE — DISCHARGE INSTRUCTIONS
Migraine Headache   WHAT YOU SHOULD KNOW:   A migraine is a severe headache  The pain can be so severe that it interferes with your daily activities  A migraine can last a few hours up to several days  The exact cause of migraines is not known  It may be caused by changes in your body chemicals and extra sensitive nerves in your brain  AFTER YOU LEAVE:   Medicines:  Take medicine as soon as you feel a migraine begin  · Pain medicine: You may need medicine to take away or decrease pain  You may need a doctor's order for this medicine  Do not wait until the pain is severe before you take your medicine  · Migraine medicines: These are used to help prevent a migraine or stop it once it starts  · Antinausea medicine: This medicine may be given to calm your stomach and to help prevent vomiting  They can also help relieve pain  · Take your medicine as directed  Call your healthcare provider if you think your medicine is not helping or if you have side effects  Tell him if you are allergic to any medicine  Keep a list of the medicines, vitamins, and herbs you take  Include the amounts, and when and why you take them  Bring the list or the pill bottles to follow-up visits  Carry your medicine list with you in case of an emergency  Manage your symptoms:   · Rest:  Rest in a dark, quiet room  This will help decrease your pain  · Ice:  Ice helps decrease pain  Use an ice pack or put crushed ice in a plastic bag  Cover the ice pack with a towel and place it on your head where it hurts for 15 to 20 minutes every hour  · Heat:  Heat helps decrease pain and muscle spasms  Use a small towel dampened with warm water or a heating pad, or sit in a warm bath  Apply heat on the area for 20 to 30 minutes every 2 hours  You may alternate heat and ice  Keep a headache diary:  Write down when your migraines start and stop  Include your symptoms and what you were doing when a migraine began   Record what you ate or drank for 24 hours before the migraine started  Describe the pain and where it hurts  Keep track of what you did to treat your migraine and whether it worked  Follow up with your primary healthcare provider or neurologist as directed:  Bring your headache diary with you when you see your primary healthcare provider  Write down your questions so you remember to ask them during your visits  Prevent another migraine:   · Do not smoke: If you smoke, it is never too late to quit  Tobacco smoke can trigger a migraine  It can also cause heart disease, lung disease, cancer, and other health problems  Quitting smoking will improve your health and the health of those around you  If you smoke, ask for information about how to stop  · Do not drink alcohol:  Alcohol can trigger a migraine  It can also interfere with the medicines used to treat your migraine  · Get regular exercise:  Exercise may help prevent migraines  Talk to your primary healthcare provider about the best exercise plan for you  · Manage stress:  Stress may trigger a migraine  Learn new ways to relax, such as deep breathing  · Stick to a sleep schedule:  Go to bed and get up at the same time each day  · Eat regular meals:  Include healthy foods such as include fruit, vegetables, whole-grain breads, low-fat dairy products, beans, lean meat, and fish  Avoid trigger foods like chocolate, hard cheese, and red wine  Foods that contain gluten, nitrates, MSG, or artificial sweeteners may also trigger migraines  Caffeine, which is often used to treat migraines, can also trigger them  Contact your primary healthcare provider or neurologist if:   · You have a fever  · Your migraines interfere with your daily activities  · Your medicines or treatments stop working  · You have questions about your condition or care    Seek care immediately or call 911 if:   · You have a headache that seems different or much worse than your usual migraine headache  · You have a severe headache with a fever or a stiff neck  · You have new problems with speech, vision, balance, or movement  · You feel like you are going to faint, you become confused, or you have a seizure  © 2014 [address] is for End User's use only and may not be sold, redistributed or otherwise used for commercial purposes  All illustrations and images included in CareNotes® are the copyrighted property of [employer] M [employer]  or [employer]win  The above information is an  only  It is not intended as medical advice for individual conditions or treatments  Talk to your doctor, nurse or pharmacist before following any medical regimen to see if it is safe and effective for you

## 2018-09-25 ENCOUNTER — HOSPITAL ENCOUNTER (EMERGENCY)
Facility: HOSPITAL | Age: 38
Discharge: HOME | End: 2018-09-26
Attending: INTERNAL MEDICINE
Payer: COMMERCIAL

## 2018-09-25 DIAGNOSIS — N83.209 OVARIAN CYST: ICD-10-CM

## 2018-09-25 DIAGNOSIS — N12 PYELONEPHRITIS: Primary | ICD-10-CM

## 2018-09-25 LAB
BACTERIA UR QL AUTO: ABNORMAL /HPF
BASOPHILS # BLD AUTO: 0 THOUSANDS/ΜL (ref 0–0.1)
BASOPHILS NFR BLD AUTO: 0 % (ref 0–2)
BILIRUB UR QL STRIP: NEGATIVE
CLARITY UR: CLEAR
COLOR UR: YELLOW
EOSINOPHIL # BLD AUTO: 0.1 THOUSAND/ΜL (ref 0–0.61)
EOSINOPHIL NFR BLD AUTO: 0 % (ref 0–5)
ERYTHROCYTE [DISTWIDTH] IN BLOOD BY AUTOMATED COUNT: 12.9 % (ref 11.5–14.5)
EXT PREG TEST URINE: NEGATIVE
GLUCOSE UR STRIP-MCNC: NEGATIVE MG/DL
HCT VFR BLD AUTO: 38.6 % (ref 34.8–46.1)
HGB BLD-MCNC: 13.2 G/DL (ref 12–16)
HGB UR QL STRIP.AUTO: NEGATIVE
KETONES UR STRIP-MCNC: NEGATIVE MG/DL
LEUKOCYTE ESTERASE UR QL STRIP: NEGATIVE
LYMPHOCYTES # BLD AUTO: 2.5 THOUSANDS/ΜL (ref 0.6–4.47)
LYMPHOCYTES NFR BLD AUTO: 17 % (ref 21–51)
MCH RBC QN AUTO: 31 PG (ref 26–34)
MCHC RBC AUTO-ENTMCNC: 34.1 G/DL (ref 31–37)
MCV RBC AUTO: 91 FL (ref 81–99)
MONOCYTES # BLD AUTO: 1.4 THOUSAND/ΜL (ref 0.17–1.22)
MONOCYTES NFR BLD AUTO: 10 % (ref 2–12)
MUCOUS THREADS UR QL AUTO: ABNORMAL
NEUTROPHILS # BLD AUTO: 10.5 THOUSANDS/ΜL (ref 1.4–6.5)
NEUTS SEG NFR BLD AUTO: 73 % (ref 42–75)
NITRITE UR QL STRIP: NEGATIVE
NON-SQ EPI CELLS URNS QL MICRO: ABNORMAL /HPF
NRBC BLD AUTO-RTO: 0 /100 WBCS
PH UR STRIP.AUTO: 6 [PH] (ref 5–8)
PLATELET # BLD AUTO: 204 THOUSANDS/UL (ref 149–390)
PMV BLD AUTO: 7.5 FL (ref 8.6–11.7)
PROT UR STRIP-MCNC: ABNORMAL MG/DL
RBC # BLD AUTO: 4.24 MILLION/UL (ref 3.9–5.2)
RBC #/AREA URNS AUTO: ABNORMAL /HPF
SP GR UR STRIP.AUTO: <=1.005 (ref 1–1.03)
UROBILINOGEN UR QL STRIP.AUTO: 0.2 E.U./DL
WBC # BLD AUTO: 14.5 THOUSAND/UL (ref 4.8–10.8)
WBC #/AREA URNS AUTO: ABNORMAL /HPF

## 2018-09-25 PROCEDURE — 99284 EMERGENCY DEPT VISIT MOD MDM: CPT

## 2018-09-25 PROCEDURE — 96374 THER/PROPH/DIAG INJ IV PUSH: CPT

## 2018-09-25 PROCEDURE — 96375 TX/PRO/DX INJ NEW DRUG ADDON: CPT

## 2018-09-25 PROCEDURE — 85025 COMPLETE CBC W/AUTO DIFF WBC: CPT | Performed by: INTERNAL MEDICINE

## 2018-09-25 PROCEDURE — 80053 COMPREHEN METABOLIC PANEL: CPT | Performed by: INTERNAL MEDICINE

## 2018-09-25 PROCEDURE — 81003 URINALYSIS AUTO W/O SCOPE: CPT | Performed by: INTERNAL MEDICINE

## 2018-09-25 PROCEDURE — 81025 URINE PREGNANCY TEST: CPT | Performed by: INTERNAL MEDICINE

## 2018-09-25 PROCEDURE — 87040 BLOOD CULTURE FOR BACTERIA: CPT | Performed by: INTERNAL MEDICINE

## 2018-09-25 PROCEDURE — 96361 HYDRATE IV INFUSION ADD-ON: CPT

## 2018-09-25 PROCEDURE — 83605 ASSAY OF LACTIC ACID: CPT | Performed by: INTERNAL MEDICINE

## 2018-09-25 PROCEDURE — 81001 URINALYSIS AUTO W/SCOPE: CPT | Performed by: INTERNAL MEDICINE

## 2018-09-25 PROCEDURE — 36415 COLL VENOUS BLD VENIPUNCTURE: CPT | Performed by: INTERNAL MEDICINE

## 2018-09-25 RX ORDER — ONDANSETRON 2 MG/ML
4 INJECTION INTRAMUSCULAR; INTRAVENOUS ONCE
Status: COMPLETED | OUTPATIENT
Start: 2018-09-25 | End: 2018-09-25

## 2018-09-25 RX ORDER — KETOROLAC TROMETHAMINE 30 MG/ML
30 INJECTION, SOLUTION INTRAMUSCULAR; INTRAVENOUS ONCE
Status: COMPLETED | OUTPATIENT
Start: 2018-09-25 | End: 2018-09-25

## 2018-09-25 RX ADMIN — ONDANSETRON 4 MG: 2 INJECTION, SOLUTION INTRAMUSCULAR; INTRAVENOUS at 23:08

## 2018-09-25 RX ADMIN — SODIUM CHLORIDE 500 ML: 0.9 INJECTION, SOLUTION INTRAVENOUS at 23:17

## 2018-09-25 RX ADMIN — KETOROLAC TROMETHAMINE 30 MG: 30 INJECTION, SOLUTION INTRAMUSCULAR at 23:08

## 2018-09-26 ENCOUNTER — APPOINTMENT (EMERGENCY)
Dept: CT IMAGING | Facility: HOSPITAL | Age: 38
End: 2018-09-26
Payer: COMMERCIAL

## 2018-09-26 VITALS
SYSTOLIC BLOOD PRESSURE: 124 MMHG | HEART RATE: 64 BPM | BODY MASS INDEX: 19.71 KG/M2 | DIASTOLIC BLOOD PRESSURE: 66 MMHG | RESPIRATION RATE: 20 BRPM | OXYGEN SATURATION: 100 % | HEIGHT: 68 IN | TEMPERATURE: 98.5 F | WEIGHT: 130.07 LBS

## 2018-09-26 LAB
ALBUMIN SERPL BCP-MCNC: 4.3 G/DL (ref 3.5–5.7)
ALP SERPL-CCNC: 58 U/L (ref 40–150)
ALT SERPL W P-5'-P-CCNC: 11 U/L (ref 7–52)
ANION GAP SERPL CALCULATED.3IONS-SCNC: 6 MMOL/L (ref 4–13)
AST SERPL W P-5'-P-CCNC: 16 U/L (ref 13–39)
BILIRUB SERPL-MCNC: 0.7 MG/DL (ref 0.2–1)
BUN SERPL-MCNC: 11 MG/DL (ref 7–25)
CALCIUM SERPL-MCNC: 9.2 MG/DL (ref 8.6–10.5)
CHLORIDE SERPL-SCNC: 102 MMOL/L (ref 98–107)
CO2 SERPL-SCNC: 28 MMOL/L (ref 21–31)
CREAT SERPL-MCNC: 1.02 MG/DL (ref 0.6–1.2)
GFR SERPL CREATININE-BSD FRML MDRD: 71 ML/MIN/1.73SQ M
GLUCOSE SERPL-MCNC: 94 MG/DL (ref 65–99)
LACTATE SERPL-SCNC: 1.3 MMOL/L (ref 0.5–2)
POTASSIUM SERPL-SCNC: 3.5 MMOL/L (ref 3.5–5.5)
PROT SERPL-MCNC: 6.6 G/DL (ref 6.4–8.9)
SODIUM SERPL-SCNC: 136 MMOL/L (ref 134–143)

## 2018-09-26 PROCEDURE — 96376 TX/PRO/DX INJ SAME DRUG ADON: CPT

## 2018-09-26 PROCEDURE — 96361 HYDRATE IV INFUSION ADD-ON: CPT

## 2018-09-26 PROCEDURE — 74177 CT ABD & PELVIS W/CONTRAST: CPT

## 2018-09-26 RX ORDER — TRAMADOL HYDROCHLORIDE 50 MG/1
50 TABLET ORAL ONCE
Status: COMPLETED | OUTPATIENT
Start: 2018-09-26 | End: 2018-09-26

## 2018-09-26 RX ORDER — LEVOFLOXACIN 500 MG/1
500 TABLET, FILM COATED ORAL DAILY
Qty: 7 TABLET | Refills: 0 | Status: SHIPPED | OUTPATIENT
Start: 2018-09-26 | End: 2018-09-28 | Stop reason: HOSPADM

## 2018-09-26 RX ORDER — FENTANYL CITRATE 50 UG/ML
50 INJECTION, SOLUTION INTRAMUSCULAR; INTRAVENOUS ONCE
Status: DISCONTINUED | OUTPATIENT
Start: 2018-09-26 | End: 2018-09-26

## 2018-09-26 RX ORDER — KETOROLAC TROMETHAMINE 30 MG/ML
15 INJECTION, SOLUTION INTRAMUSCULAR; INTRAVENOUS ONCE
Status: COMPLETED | OUTPATIENT
Start: 2018-09-26 | End: 2018-09-26

## 2018-09-26 RX ORDER — LEVOFLOXACIN 500 MG/1
500 TABLET, FILM COATED ORAL ONCE
Status: COMPLETED | OUTPATIENT
Start: 2018-09-26 | End: 2018-09-26

## 2018-09-26 RX ORDER — TRAMADOL HYDROCHLORIDE 50 MG/1
50 TABLET ORAL EVERY 4 HOURS
Qty: 20 TABLET | Refills: 0 | Status: SHIPPED | OUTPATIENT
Start: 2018-09-26 | End: 2018-10-01

## 2018-09-26 RX ADMIN — KETOROLAC TROMETHAMINE 15 MG: 30 INJECTION, SOLUTION INTRAMUSCULAR at 02:57

## 2018-09-26 RX ADMIN — IOHEXOL 50 ML: 240 INJECTION, SOLUTION INTRATHECAL; INTRAVASCULAR; INTRAVENOUS; ORAL at 02:11

## 2018-09-26 RX ADMIN — LEVOFLOXACIN 500 MG: 500 TABLET, FILM COATED ORAL at 03:18

## 2018-09-26 RX ADMIN — IOHEXOL 80 ML: 350 INJECTION, SOLUTION INTRAVENOUS at 02:11

## 2018-09-26 RX ADMIN — TRAMADOL HYDROCHLORIDE 50 MG: 50 TABLET, FILM COATED ORAL at 03:19

## 2018-09-26 NOTE — DISCHARGE INSTRUCTIONS
Kidney Infection   WHAT YOU NEED TO KNOW:   A kidney infection, or pyelonephritis, is a bacterial infection  The infection usually starts in your bladder or urethra and moves into your kidney  One or both kidneys may be infected  DISCHARGE INSTRUCTIONS:   Return to the emergency department if:   · You have a fever and chills  · You cannot stop vomiting  · You have severe pain in your abdomen, lower back, or sides  Contact your healthcare provider if:   · You continue to have a fever after you take antibiotics for 3 days  · You have pain when you urinate, even after treatment  · Your signs and symptoms return  · You have questions or concerns about your condition or care  Medicines: You may  need any of the following:  · Antibiotics  treat your bacterial infection  · Acetaminophen  decreases pain and fever  It is available without a doctor's order  Ask how much to take and how often to take it  Follow directions  Read the labels of all other medicines you are using to see if they also contain acetaminophen, or ask your doctor or pharmacist  Acetaminophen can cause liver damage if not taken correctly  Do not use more than 4 grams (4,000 milligrams) total of acetaminophen in one day  · NSAIDs , such as ibuprofen, help decrease swelling, pain, and fever  This medicine is available with or without a doctor's order  NSAIDs can cause stomach bleeding or kidney problems in certain people  If you take blood thinner medicine, always ask if NSAIDs are safe for you  Always read the medicine label and follow directions  Do not give these medicines to children under 10months of age without direction from your child's healthcare provider  · Prescription pain medicine  may be given  Ask how to take this medicine safely  · Take your medicine as directed  Contact your healthcare provider if you think your medicine is not helping or if you have side effects   Tell him of her if you are allergic to any medicine  Keep a list of the medicines, vitamins, and herbs you take  Include the amounts, and when and why you take them  Bring the list or the pill bottles to follow-up visits  Carry your medicine list with you in case of an emergency  Drink liquids as directed: You may need to drink extra liquids to help flush your kidneys and urinary system  Water is the best liquid to drink  Ask your healthcare provider how much liquid to drink each day and which liquids are best for you  Urinate as soon as you feel the urge: This will help flush bacteria from your urinary system  Do not wait or hold your urine for too long  Clean your genital area every day with soap and water:  Wipe from front to back after you urinate or have a bowel movement  Wear cotton underwear  Fabrics such as nylon and polyester can stay damp  This can increase your risk for infection  Urinate within 15 minutes after you have sex  Follow up with your healthcare provider as directed:  Write down your questions so you remember to ask them during your visits  © 2017 [facility] Information is for End User's use only and may not be sold, redistributed or otherwise used for commercial purposes  All illustrations and images included in CareNotes® are the copyrighted property of A D A M [employer] Inc  or [employer]win  The above information is an  only  It is not intended as medical advice for individual conditions or treatments  Talk to your doctor, nurse or pharmacist before following any medical regimen to see if it is safe and effective for you

## 2018-09-26 NOTE — ED PROVIDER NOTES
History  Chief Complaint   Patient presents with    Back Pain     Started with fever chills pain in right flank moving across back started today  Patient has been on [address] since Friday for UTI, states "seemed like it was getting better," now having right flank pain  Came to [facility] for rogeral     30year-old struggling with abdominal flank pain for the last several days  She works in [employer] and dipped her urine, found to have a large amount of leukocytes  Was prescribed Macrobid but did start for several days  She went hiking up in [place] and on return just does not feel right  She has had fever now, some chills, describing bilateral leg pain and discomfort  Prior history significant for an appendectomy, hemorrhagic ovarian cyst requiring surgery, and prior UTIs  She was recently in the emergency room here for headache, but at that time had none of the symptoms            Prior to Admission Medications   Prescriptions Last Dose Informant Patient Reported? Taking? SUMAtriptan (IMITREX) 100 mg tablet   Yes No   Sig: Take 100 mg by mouth once as needed for migraine   SUMAtriptan (IMITREX) 100 mg tablet   No No   Sig: Take 1 tablet (100 mg total) by mouth once as needed for migraine for up to 1 dose   cholestyramine sugar free (QUESTRAN LIGHT) 4 g packet   Yes No   Sig: Take 4 g by mouth as needed      Facility-Administered Medications: None       Past Medical History:   Diagnosis Date    Migraine        Past Surgical History:   Procedure Laterality Date    APPENDECTOMY      TONSILLECTOMY         History reviewed  No pertinent family history  I have reviewed and agree with the history as documented  Social History   Substance Use Topics    Smoking status: Never Smoker    Smokeless tobacco: Never Used    Alcohol use No        Review of Systems   Constitutional: Positive for chills, fatigue and fever  HENT: Negative for rhinorrhea and sore throat  Eyes: Negative for visual disturbance  Respiratory: Negative for cough and shortness of breath  Cardiovascular: Negative for chest pain and leg swelling  Gastrointestinal: Positive for abdominal pain  Negative for diarrhea, nausea and vomiting  Right flank pain   Genitourinary: Negative for dysuria  Musculoskeletal: Positive for back pain and myalgias  Leg discomfort   Skin: Negative for rash  Neurological: Positive for dizziness  Negative for headaches  Psychiatric/Behavioral: Negative for confusion  All other systems reviewed and are negative  Physical Exam  Physical Exam   Constitutional: She is oriented to person, place, and time  She appears well-developed and well-nourished  HENT:   Nose: Nose normal    Mouth/Throat: Oropharynx is clear and moist  No oropharyngeal exudate  Eyes: Conjunctivae and EOM are normal  Pupils are equal, round, and reactive to light  No scleral icterus  Neck: Normal range of motion  Neck supple  No JVD present  No tracheal deviation present  Cardiovascular: Normal rate, regular rhythm and normal heart sounds  No murmur heard  Pulmonary/Chest: Effort normal and breath sounds normal  No respiratory distress  She has no wheezes  She has no rales  Abdominal: Bowel sounds are normal  There is no tenderness  There is no guarding  Mildly tender right lower quadrant, low flank pain on the right as well   Musculoskeletal: Normal range of motion  She exhibits no edema or tenderness  Neurological: She is alert and oriented to person, place, and time  No cranial nerve deficit or sensory deficit  She exhibits normal muscle tone  5/5 motor, nl sens   Skin: Skin is warm and dry  Psychiatric: She has a normal mood and affect  Her behavior is normal    Nursing note and vitals reviewed        Vital Signs  ED Triage Vitals [09/25/18 2116]   Temperature Pulse Respirations Blood Pressure SpO2   100 °F (37 8 °C) 83 18 124/71 99 %      Temp Source Heart Rate Source Patient Position - Orthostatic VS BP Location FiO2 (%)   Temporal Monitor Sitting -- --      Pain Score       6           Vitals:    09/25/18 2116   BP: 124/71   Pulse: 83   Patient Position - Orthostatic VS: Sitting       Visual Acuity      ED Medications  Medications - No data to display    Diagnostic Studies  Results Reviewed     None                 No orders to display              Procedures  Procedures       Phone Contacts  ED Phone Contact    ED Course                               MDM  CritCare Time    Disposition  Final diagnoses:   None     ED Disposition     None      Follow-up Information    None         Patient's Medications   Discharge Prescriptions    No medications on file     No discharge procedures on file      ED Provider  Electronically Signed by           Romulo Hood DO  09/25/18 [address] [place] [address]   09/26/18 6929

## 2018-09-27 ENCOUNTER — APPOINTMENT (EMERGENCY)
Dept: ULTRASOUND IMAGING | Facility: HOSPITAL | Age: 38
End: 2018-09-27
Payer: COMMERCIAL

## 2018-09-27 ENCOUNTER — APPOINTMENT (OUTPATIENT)
Dept: RADIOLOGY | Facility: HOSPITAL | Age: 38
End: 2018-09-27
Payer: COMMERCIAL

## 2018-09-27 ENCOUNTER — HOSPITAL ENCOUNTER (OUTPATIENT)
Facility: HOSPITAL | Age: 38
Setting detail: OBSERVATION
Discharge: HOME | End: 2018-09-28
Attending: EMERGENCY MEDICINE | Admitting: INTERNAL MEDICINE
Payer: COMMERCIAL

## 2018-09-27 DIAGNOSIS — N12 PYELONEPHRITIS: ICD-10-CM

## 2018-09-27 DIAGNOSIS — N17.9 AKI (ACUTE KIDNEY INJURY) (HCC): Primary | ICD-10-CM

## 2018-09-27 DIAGNOSIS — R10.9 FLANK PAIN: ICD-10-CM

## 2018-09-27 PROBLEM — Z86.69 HISTORY OF MIGRAINE: Status: ACTIVE | Noted: 2018-09-27

## 2018-09-27 LAB
ALBUMIN SERPL BCP-MCNC: 3.4 G/DL (ref 3.5–5)
ALP SERPL-CCNC: 81 U/L (ref 46–116)
ALT SERPL W P-5'-P-CCNC: 22 U/L (ref 12–78)
ANION GAP SERPL CALCULATED.3IONS-SCNC: 10 MMOL/L (ref 4–13)
AST SERPL W P-5'-P-CCNC: 19 U/L (ref 5–45)
B-HCG SERPL-ACNC: <2 MIU/ML
BACTERIA UR QL AUTO: ABNORMAL /HPF
BASOPHILS # BLD AUTO: 0.03 THOUSANDS/ΜL (ref 0–0.1)
BASOPHILS NFR BLD AUTO: 0 % (ref 0–1)
BILIRUB SERPL-MCNC: 1.4 MG/DL (ref 0.2–1)
BILIRUB UR QL STRIP: NEGATIVE
BUN SERPL-MCNC: 15 MG/DL (ref 5–25)
CALCIUM SERPL-MCNC: 8.9 MG/DL (ref 8.3–10.1)
CHLORIDE SERPL-SCNC: 102 MMOL/L (ref 100–108)
CLARITY UR: CLEAR
CO2 SERPL-SCNC: 28 MMOL/L (ref 21–32)
COLOR UR: YELLOW
CREAT SERPL-MCNC: 1.72 MG/DL (ref 0.6–1.3)
CREAT UR-MCNC: 133 MG/DL
CREAT UR-MCNC: 137 MG/DL
EOSINOPHIL # BLD AUTO: 0.06 THOUSAND/ΜL (ref 0–0.61)
EOSINOPHIL NFR BLD AUTO: 1 % (ref 0–6)
ERYTHROCYTE [DISTWIDTH] IN BLOOD BY AUTOMATED COUNT: 12.2 % (ref 11.6–15.1)
EXT PREG TEST URINE: NEGATIVE
GFR SERPL CREATININE-BSD FRML MDRD: 38 ML/MIN/1.73SQ M
GLUCOSE SERPL-MCNC: 90 MG/DL (ref 65–140)
GLUCOSE UR STRIP-MCNC: NEGATIVE MG/DL
HCT VFR BLD AUTO: 38.4 % (ref 34.8–46.1)
HGB BLD-MCNC: 13.2 G/DL (ref 11.5–15.4)
HGB UR QL STRIP.AUTO: ABNORMAL
HOLD SPECIMEN: NORMAL
IMM GRANULOCYTES # BLD AUTO: 0.07 THOUSAND/UL (ref 0–0.2)
IMM GRANULOCYTES NFR BLD AUTO: 1 % (ref 0–2)
KETONES UR STRIP-MCNC: ABNORMAL MG/DL
LACTATE SERPL-SCNC: 1.1 MMOL/L (ref 0.5–2)
LEUKOCYTE ESTERASE UR QL STRIP: NEGATIVE
LIPASE SERPL-CCNC: 111 U/L (ref 73–393)
LYMPHOCYTES # BLD AUTO: 1.33 THOUSANDS/ΜL (ref 0.6–4.47)
LYMPHOCYTES NFR BLD AUTO: 11 % (ref 14–44)
MAGNESIUM SERPL-MCNC: 2 MG/DL (ref 1.6–2.6)
MCH RBC QN AUTO: 30.8 PG (ref 26.8–34.3)
MCHC RBC AUTO-ENTMCNC: 34.4 G/DL (ref 31.4–37.4)
MCV RBC AUTO: 90 FL (ref 82–98)
MONOCYTES # BLD AUTO: 1.19 THOUSAND/ΜL (ref 0.17–1.22)
MONOCYTES NFR BLD AUTO: 10 % (ref 4–12)
NEUTROPHILS # BLD AUTO: 9.88 THOUSANDS/ΜL (ref 1.85–7.62)
NEUTS SEG NFR BLD AUTO: 77 % (ref 43–75)
NITRITE UR QL STRIP: NEGATIVE
NON-SQ EPI CELLS URNS QL MICRO: ABNORMAL /HPF
NRBC BLD AUTO-RTO: 0 /100 WBCS
PH UR STRIP.AUTO: 6 [PH] (ref 4.5–8)
PLATELET # BLD AUTO: 196 THOUSANDS/UL (ref 149–390)
PMV BLD AUTO: 9.5 FL (ref 8.9–12.7)
POTASSIUM SERPL-SCNC: 3.8 MMOL/L (ref 3.5–5.3)
PROCALCITONIN SERPL-MCNC: <0.05 NG/ML
PROT SERPL-MCNC: 6.9 G/DL (ref 6.4–8.2)
PROT UR STRIP-MCNC: ABNORMAL MG/DL
PROT UR-MCNC: 81 MG/DL
PROT/CREAT UR: 0.59 MG/G{CREAT} (ref 0–0.1)
RBC # BLD AUTO: 4.28 MILLION/UL (ref 3.81–5.12)
RBC #/AREA URNS AUTO: ABNORMAL /HPF
SODIUM 24H UR-SCNC: 57 MOL/L
SODIUM 24H UR-SCNC: 59 MOL/L
SODIUM SERPL-SCNC: 140 MMOL/L (ref 136–145)
SP GR UR STRIP.AUTO: 1.02 (ref 1–1.03)
UROBILINOGEN UR QL STRIP.AUTO: 0.2 E.U./DL
WBC # BLD AUTO: 12.56 THOUSAND/UL (ref 4.31–10.16)
WBC #/AREA URNS AUTO: ABNORMAL /HPF

## 2018-09-27 PROCEDURE — 84156 ASSAY OF PROTEIN URINE: CPT | Performed by: INTERNAL MEDICINE

## 2018-09-27 PROCEDURE — 82570 ASSAY OF URINE CREATININE: CPT | Performed by: INTERNAL MEDICINE

## 2018-09-27 PROCEDURE — 96361 HYDRATE IV INFUSION ADD-ON: CPT

## 2018-09-27 PROCEDURE — 76770 US EXAM ABDO BACK WALL COMP: CPT

## 2018-09-27 PROCEDURE — 96375 TX/PRO/DX INJ NEW DRUG ADDON: CPT

## 2018-09-27 PROCEDURE — 84145 PROCALCITONIN (PCT): CPT | Performed by: INTERNAL MEDICINE

## 2018-09-27 PROCEDURE — 87086 URINE CULTURE/COLONY COUNT: CPT | Performed by: FAMILY MEDICINE

## 2018-09-27 PROCEDURE — 81001 URINALYSIS AUTO W/SCOPE: CPT | Performed by: EMERGENCY MEDICINE

## 2018-09-27 PROCEDURE — 86618 LYME DISEASE ANTIBODY: CPT | Performed by: EMERGENCY MEDICINE

## 2018-09-27 PROCEDURE — 85025 COMPLETE CBC W/AUTO DIFF WBC: CPT | Performed by: EMERGENCY MEDICINE

## 2018-09-27 PROCEDURE — 84300 ASSAY OF URINE SODIUM: CPT | Performed by: FAMILY MEDICINE

## 2018-09-27 PROCEDURE — 99285 EMERGENCY DEPT VISIT HI MDM: CPT

## 2018-09-27 PROCEDURE — 99220 PR INITIAL OBSERVATION CARE/DAY 70 MINUTES: CPT | Performed by: FAMILY MEDICINE

## 2018-09-27 PROCEDURE — 36415 COLL VENOUS BLD VENIPUNCTURE: CPT | Performed by: EMERGENCY MEDICINE

## 2018-09-27 PROCEDURE — 82570 ASSAY OF URINE CREATININE: CPT | Performed by: FAMILY MEDICINE

## 2018-09-27 PROCEDURE — 80053 COMPREHEN METABOLIC PANEL: CPT | Performed by: EMERGENCY MEDICINE

## 2018-09-27 PROCEDURE — 81025 URINE PREGNANCY TEST: CPT | Performed by: EMERGENCY MEDICINE

## 2018-09-27 PROCEDURE — 96374 THER/PROPH/DIAG INJ IV PUSH: CPT

## 2018-09-27 PROCEDURE — 87040 BLOOD CULTURE FOR BACTERIA: CPT | Performed by: INTERNAL MEDICINE

## 2018-09-27 PROCEDURE — 83690 ASSAY OF LIPASE: CPT | Performed by: EMERGENCY MEDICINE

## 2018-09-27 PROCEDURE — 83605 ASSAY OF LACTIC ACID: CPT | Performed by: INTERNAL MEDICINE

## 2018-09-27 PROCEDURE — 71046 X-RAY EXAM CHEST 2 VIEWS: CPT

## 2018-09-27 PROCEDURE — 84702 CHORIONIC GONADOTROPIN TEST: CPT | Performed by: INTERNAL MEDICINE

## 2018-09-27 PROCEDURE — 87086 URINE CULTURE/COLONY COUNT: CPT | Performed by: INTERNAL MEDICINE

## 2018-09-27 PROCEDURE — 84300 ASSAY OF URINE SODIUM: CPT | Performed by: INTERNAL MEDICINE

## 2018-09-27 PROCEDURE — 83735 ASSAY OF MAGNESIUM: CPT | Performed by: EMERGENCY MEDICINE

## 2018-09-27 RX ORDER — LIDOCAINE 50 MG/G
1 PATCH TOPICAL DAILY
Status: DISCONTINUED | OUTPATIENT
Start: 2018-09-27 | End: 2018-09-28 | Stop reason: HOSPADM

## 2018-09-27 RX ORDER — SODIUM CHLORIDE 9 MG/ML
125 INJECTION, SOLUTION INTRAVENOUS CONTINUOUS
Status: DISCONTINUED | OUTPATIENT
Start: 2018-09-27 | End: 2018-09-28

## 2018-09-27 RX ORDER — ONDANSETRON 2 MG/ML
4 INJECTION INTRAMUSCULAR; INTRAVENOUS ONCE
Status: COMPLETED | OUTPATIENT
Start: 2018-09-27 | End: 2018-09-27

## 2018-09-27 RX ORDER — ZOLPIDEM TARTRATE 5 MG/1
5 TABLET ORAL
Status: DISCONTINUED | OUTPATIENT
Start: 2018-09-27 | End: 2018-09-28 | Stop reason: HOSPADM

## 2018-09-27 RX ORDER — TRAMADOL HYDROCHLORIDE 50 MG/1
50 TABLET ORAL EVERY 6 HOURS PRN
Status: DISCONTINUED | OUTPATIENT
Start: 2018-09-27 | End: 2018-09-28 | Stop reason: HOSPADM

## 2018-09-27 RX ORDER — ONDANSETRON 2 MG/ML
4 INJECTION INTRAMUSCULAR; INTRAVENOUS EVERY 4 HOURS PRN
Status: DISCONTINUED | OUTPATIENT
Start: 2018-09-27 | End: 2018-09-28 | Stop reason: HOSPADM

## 2018-09-27 RX ORDER — KETOROLAC TROMETHAMINE 30 MG/ML
15 INJECTION, SOLUTION INTRAMUSCULAR; INTRAVENOUS ONCE
Status: COMPLETED | OUTPATIENT
Start: 2018-09-27 | End: 2018-09-27

## 2018-09-27 RX ORDER — SODIUM CHLORIDE 9 MG/ML
125 INJECTION, SOLUTION INTRAVENOUS CONTINUOUS
Status: DISCONTINUED | OUTPATIENT
Start: 2018-09-27 | End: 2018-09-28 | Stop reason: HOSPADM

## 2018-09-27 RX ADMIN — ONDANSETRON 4 MG: 2 INJECTION INTRAMUSCULAR; INTRAVENOUS at 09:26

## 2018-09-27 RX ADMIN — SODIUM CHLORIDE 1000 ML: 0.9 INJECTION, SOLUTION INTRAVENOUS at 10:41

## 2018-09-27 RX ADMIN — KETOROLAC TROMETHAMINE 15 MG: 30 INJECTION, SOLUTION INTRAMUSCULAR at 09:53

## 2018-09-27 RX ADMIN — SODIUM CHLORIDE 1000 ML: 0.9 INJECTION, SOLUTION INTRAVENOUS at 09:26

## 2018-09-27 RX ADMIN — SODIUM CHLORIDE 125 ML/HR: 0.9 INJECTION, SOLUTION INTRAVENOUS at 22:27

## 2018-09-27 RX ADMIN — CEFTRIAXONE 1000 MG: 1 INJECTION, SOLUTION INTRAVENOUS at 16:03

## 2018-09-27 RX ADMIN — LIDOCAINE 1 PATCH: 140 PATCH CUTANEOUS at 16:08

## 2018-09-27 RX ADMIN — SODIUM CHLORIDE 125 ML/HR: 0.9 INJECTION, SOLUTION INTRAVENOUS at 13:53

## 2018-09-27 RX ADMIN — ZOLPIDEM TARTRATE 5 MG: 5 TABLET ORAL at 22:26

## 2018-09-27 RX ADMIN — ONDANSETRON 4 MG: 2 INJECTION INTRAMUSCULAR; INTRAVENOUS at 22:26

## 2018-09-27 NOTE — ASSESSMENT & PLAN NOTE
High probability that urine is sterile given she has been on macrobid and levaquin  Will still pursue a urine culture  Empirically treat with rocephin  Follow up blood cultures, lactic acid and procalcitonin

## 2018-09-27 NOTE — H&P
H&P Exam - Neris Catherine 30 y o  female MRN: [id number]    Unit/Bed#: II06 Encounter: [id number]    Pyelonephritis   Assessment & Plan    High probability that urine is sterile given she has been on macrobid and levaquin  Will still pursue a urine culture  Empirically treat with rocephin  Follow up blood cultures, lactic acid and procalcitonin       ANDRA (acute kidney injury) ([facility] [address] )   Assessment & Plan    Check urine sodium and urine creatinine  NS @ 125cc/hr   No sign of obstructive uropathy on U S   Monitor output      History of migraine   Assessment & Plan    Currently without a headache  P r n  Imitrex           History of Present Illness      Patient is a pleasant 30-year-old female with a past medical history significant for migraines who presents to emergency room today with chief complaint of right flank pain  The patient states she dipped her on urine on 09/21/2018  A provider prescribed Macrobid and the patient completed 4 days without significant improvement  She developed some nausea and vomiting and presented to the emergency room on 09/25/2018 and was sent home on Levaquin  The patient was not able to keep down her 2 tablets of Levaquin due to emesis  She states she has had significant decreased p o  intake  Nausea, vomiting, chills and fevers as high as 101 at home  On exam she is resting comfortably in no acute distress  She is denying headaches  Her right flank pain is positional     Review of Systems   Constitutional: Positive for chills, fatigue and fever  HENT: Negative  Respiratory: Negative  Cardiovascular: Negative  Gastrointestinal: Negative  Endocrine: Negative  Genitourinary: Positive for flank pain  Negative for decreased urine volume, frequency, genital sores, urgency, vaginal bleeding, vaginal discharge and vaginal pain  Allergic/Immunologic: Negative  Neurological: Negative  Hematological: Negative  Psychiatric/Behavioral: Negative          Historical Information   Past Medical History:   Diagnosis Date    Migraine      Past Surgical History:   Procedure Laterality Date    APPENDECTOMY      TONSILLECTOMY       Social History   History   Alcohol Use No     History   Drug Use No     History   Smoking Status    Never Smoker   Smokeless Tobacco    Never Used     Family History: non-contributory    Meds/Allergies   all medications and allergies reviewed  Allergies   Allergen Reactions    Hydrocodone Anaphylaxis    Morphine Palpitations    Morphine And Related Anaphylaxis and Palpitations    Oxycodone Anaphylaxis    Penicillins Fever and Other (See Comments)    Clomid [Clomiphene]     Sulfa Antibiotics Fever       Objective   First Vitals:   Blood Pressure: 140/77 (09/27/18 0853)  Pulse: 70 (09/27/18 0853)  Temperature: 98 6 °F (37 °C) (09/27/18 0853)  Temp Source: Temporal (09/27/18 0853)  Respirations: 18 (09/27/18 0853)  Height: 5' 8" (172 7 cm) (09/27/18 0853)  Weight - Scale: 58 8 kg (129 lb 10 1 oz) (09/27/18 0853)  SpO2: 100 % (09/27/18 0853)    Current Vitals:   Blood Pressure: 109/54 (09/27/18 1030)  Pulse: 77 (09/27/18 1030)  Temperature: 98 6 °F (37 °C) (09/27/18 0853)  Temp Source: Temporal (09/27/18 0853)  Respirations: 16 (09/27/18 1030)  Height: 5' 8" (172 7 cm) (09/27/18 0853)  Weight - Scale: 58 8 kg (129 lb 10 1 oz) (09/27/18 0853)  SpO2: 99 % (09/27/18 1030)      Intake/Output Summary (Last 24 hours) at 09/27/18 1307  Last data filed at 09/27/18 1151   Gross per 24 hour   Intake             2000 ml   Output                0 ml   Net             2000 ml       Invasive Devices     Peripheral Intravenous Line            Peripheral IV 09/27/18 Right Antecubital less than 1 day                Physical Exam   Constitutional: She is oriented to person, place, and time  She appears well-developed  HENT:   Head: Normocephalic  Mouth/Throat: No oropharyngeal exudate  Eyes: Pupils are equal, round, and reactive to light  No scleral icterus  Neck: Neck supple  No JVD present  Cardiovascular: Normal rate and regular rhythm  No murmur heard  Pulmonary/Chest: Effort normal and breath sounds normal  No respiratory distress  She has no wheezes  She has no rales  Abdominal: Soft  Bowel sounds are normal  She exhibits no distension  There is no tenderness  There is no rebound  Musculoskeletal:   Positive right CVA tenderness   Neurological: She is alert and oriented to person, place, and time  No cranial nerve deficit  Coordination normal    Skin: Skin is warm  No rash noted  She is not diaphoretic  No erythema  Lab Results:   Lab Results   Component Value Date    WBC 12 56 (H) 09/27/2018    HGB 13 2 09/27/2018    HCT 38 4 09/27/2018    MCV 90 09/27/2018     09/27/2018     Lab Results   Component Value Date    CALCIUM 8 9 09/27/2018     09/27/2018    K 3 8 09/27/2018    CO2 28 09/27/2018     09/27/2018    BUN 15 09/27/2018    CREATININE 1 72 (H) 09/27/2018       Imaging:   XR chest 2 views   Final Result      Normal chest              Workstation performed: [id number]         US kidney and bladder   Final Result      Normal renal/bladder ultrasound        Workstation performed: [id number]               EKG, Pathology, and Other Studies: UA bland   No luek/nitrie  0-1wbc    Code Status: No Order  Advance Directive and Living Will:      Power of :    POLST:      Counseling / Coordination of Care:

## 2018-09-27 NOTE — ED PROVIDER NOTES
History  Chief Complaint   Patient presents with    Abdominal Pain     Abdominal pain, vomiting, recently put on levaquin for ovarian cyst     Patient: Pa Thakur  37 y o /female  YOB: 1980  MRN: [id number]  PCP: Aidan Mejia DO  Date of evaluation: 9/27/2018    (N B   [address] may have been used in the preparation of this document  Occasional wrong word or "sound-alike" substitutions may have occurred due to the inherent limitations of voice recognition software  Interpretation should be guided by context )    This patient presents to the emergency department with a chief complaint of "I do not feel good"  She complains of fevers and chills, body aches, nausea and vomiting  Three weeks ago she was diagnosed with a UTI and treated with Macrobid  She says that she never got better from that  On September 24th she was seen at [facility] for a headache  She received a head CT  She received medication for the headache, experience good relief, and was discharged home  On September 25th she was seen again at [facility], this time for back pain  She also had fevers and chills as well as body aches at that time  She had a CT scan which was negative except for small amount of free fluid in the pelvis  Her white count was 14 5  She was diagnosed with pyelonephritis  She was discharged with Levaquin  History provided by:  Medical records, patient and relative      Prior to Admission Medications   Prescriptions Last Dose Informant Patient Reported? Taking?    SUMAtriptan (IMITREX) 100 mg tablet Past Week at Unknown time  No Yes   Sig: Take 1 tablet (100 mg total) by mouth once as needed for migraine for up to 1 dose   cholestyramine sugar free (QUESTRAN LIGHT) 4 g packet Past Week at Unknown time  Yes Yes   Sig: Take 4 g by mouth as needed   levofloxacin (LEVAQUIN) 500 mg tablet 9/26/2018 at Unknown time  No Yes   Sig: Take 1 tablet (500 mg total) by mouth daily for 7 days   traMADol (ULTRAM) 50 mg tablet Past Week at Unknown time  No Yes   Sig: Take 1 tablet (50 mg total) by mouth every 4 (four) hours for 20 doses      Facility-Administered Medications: None       Past Medical History:   Diagnosis Date    Migraine        Past Surgical History:   Procedure Laterality Date    APPENDECTOMY      TONSILLECTOMY         History reviewed  No pertinent family history  I have reviewed and agree with the history as documented  Social History   Substance Use Topics    Smoking status: Never Smoker    Smokeless tobacco: Never Used    Alcohol use No        Review of Systems   Constitutional: Positive for chills, fatigue and fever  HENT: Negative for hearing loss, trouble swallowing and voice change  Eyes: Negative for pain, redness and visual disturbance  Respiratory: Negative for cough and shortness of breath  Cardiovascular: Negative for chest pain and palpitations  Gastrointestinal: Positive for nausea and vomiting  Negative for abdominal pain, constipation and diarrhea  Genitourinary: Positive for flank pain  Negative for difficulty urinating, dysuria, frequency, hematuria, urgency, vaginal bleeding and vaginal discharge  Musculoskeletal: Positive for myalgias  Negative for back pain, gait problem and neck pain  Skin: Negative for color change and rash  Neurological: Negative for weakness and light-headedness  Psychiatric/Behavioral: Negative for confusion and decreased concentration  The patient is not nervous/anxious  All other systems reviewed and are negative  Physical Exam  Physical Exam   Constitutional: She is oriented to person, place, and time  She appears well-developed and well-nourished  She is cooperative  Non-toxic appearance  She appears ill  HENT:   Head: Normocephalic and atraumatic     Mouth/Throat: Oropharynx is clear and moist and mucous membranes are normal    Voice normal   Eyes: Pupils are equal, round, and reactive to light  EOM are normal    Cardiovascular: Normal rate and regular rhythm  Pulmonary/Chest: Effort normal    Abdominal: Soft  Normal appearance and bowel sounds are normal  There is no tenderness  There is CVA tenderness (right)  Neurological: She is alert and oriented to person, place, and time  GCS eye subscore is 4  GCS verbal subscore is 5  GCS motor subscore is 6  Skin: Skin is warm and dry  Psychiatric: She has a normal mood and affect  Her speech is normal and behavior is normal    Nursing note and vitals reviewed        Vital Signs  ED Triage Vitals   Temperature Pulse Respirations Blood Pressure SpO2   09/27/18 0853 09/27/18 0853 09/27/18 0853 09/27/18 0853 09/27/18 0853   98 6 °F (37 °C) 70 18 140/77 100 %      Temp Source Heart Rate Source Patient Position - Orthostatic VS BP Location FiO2 (%)   09/27/18 0853 09/27/18 0853 09/27/18 0853 09/27/18 0853 --   Temporal Monitor Lying Right arm       Pain Score       09/27/18 0953       6           Vitals:    09/27/18 1030 09/27/18 1130 09/27/18 1328 09/27/18 1400   BP: 109/54 117/66 111/59 116/67   Pulse: 77 71 63 58   Patient Position - Orthostatic VS: Lying          Visual Acuity      ED Medications  Medications   sodium chloride 0 9 % infusion (125 mL/hr Intravenous New Bag 9/27/18 1353)   traMADol (ULTRAM) tablet 50 mg (not administered)   ondansetron (ZOFRAN) injection 4 mg (4 mg Intravenous Given 9/27/18 0926)   sodium chloride 0 9 % bolus 1,000 mL (0 mL Intravenous Stopped 9/27/18 1042)   ketorolac (TORADOL) injection 15 mg (15 mg Intravenous Given 9/27/18 0953)   sodium chloride 0 9 % bolus 1,000 mL (0 mL Intravenous Stopped 9/27/18 1151)       Diagnostic Studies  Results Reviewed     Procedure Component Value Units Date/Time    Lactic acid, plasma [[id number]]  (Normal) Collected:  09/27/18 1307    Lab Status:  Final result Specimen:  Blood from Hand, Left Updated:  09/27/18 1348     LACTIC ACID 1 1 mmol/L     Narrative:         Result may be elevated if tourniquet was used during collection  hCG, quantitative [[id number]]  (Normal) Collected:  09/27/18 1307    Lab Status:  Final result Specimen:  Blood from Hand, Left Updated:  09/27/18 1338     HCG, Quant <2 mIU/mL     Narrative:          Expected Ranges:     Approximate               Approximate HCG  Gestation age          Concentration ( mIU/mL)  _____________          ______________________   Reagan Cervantes                      HCG values  0 2-1                       5-50  1-2                           2-3                         100-5000  3-4                         500-14829  4-5                         1000-91376  5-6                         [id number]  6-8                         [id number]  8-12                        [id number]    Blood culture [[id number]] Collected:  09/27/18 1307    Lab Status: In process Specimen:  Blood from Hand, Left Updated:  09/27/18 1312    Blood culture [[id number]] Collected:  09/27/18 1309    Lab Status: In process Specimen:  Blood from Hand, Right Updated:  09/27/18 1312    Procalcitonin [[id number]] Collected:  09/27/18 1307    Lab Status: In process Specimen:  Blood from Hand, Left Updated:  09/27/18 1311    Urine culture [[id number]]     Lab Status:  No result Specimen:  Urine     Urine culture [[id number]] Collected:  09/27/18 1204    Lab Status: In process Specimen:  Urine from Urine, Clean Catch Updated:  09/27/18 1254    Urine Microscopic [[id number]]  (Abnormal) Collected:  09/27/18 1203    Lab Status:  Final result Specimen:  Urine from Urine, Clean Catch Updated:  09/27/18 1223     RBC, UA 0-1 (A) /hpf      WBC, UA 2-4 (A) /hpf      Epithelial Cells Moderate (A) /hpf      Bacteria, UA Moderate (A) /hpf     Sodium, urine, random [[id number]] Collected:  09/27/18 1204    Lab Status: In process Specimen:  Urine from Urine, Clean Catch Updated:  09/27/18 1215    Protein / creatinine ratio, urine [[id number]] Collected:  09/27/18 1204    Lab Status:   In process Specimen:  Urine from Urine, Clean Catch Updated:  09/27/18 1215    UA w Reflex to Microscopic w Reflex to Culture [[id number]]  (Abnormal) Collected:  09/27/18 1203    Lab Status:  Final result Specimen:  Urine from Urine, Clean Catch Updated:  09/27/18 1212     Color, UA Yellow     Clarity, UA Clear     Specific [place], UA 1 020     pH, UA 6 0     Leukocytes, UA Negative     Nitrite, UA Negative     Protein,  (2+) (A) mg/dl      Glucose, UA Negative mg/dl      Ketones, UA 40 (2+) (A) mg/dl      Urobilinogen, UA 0 2 E U /dl      Bilirubin, UA Negative     Blood, UA Trace-Intact    POCT pregnancy, urine [[id number]]  (Normal) Resulted:  09/27/18 1208    Lab Status:  Final result Updated:  09/27/18 1208     EXT PREG TEST UR (Ref: Negative) negative    [place] draw [[id number]] Collected:  09/27/18 0922    Lab Status:  Final result Specimen:  Blood Updated:  09/27/18 1101    Narrative: The following orders were created for panel order [place] draw  Procedure                               Abnormality         Status                     ---------                               -----------         ------                     [place] Top on SUGQ[[id number]]                            Final result               Green / Yellow tube on YUBR[[id number]]                       Final result               Green / Black tube on PHTK[[id number]]                        Final result               Lavender Top 3 ml on CJQC[[id number]]                         Final result               Lavender Top 7ml on DFFN[[id number]]                          Final result                 Please view results for these tests on the individual orders      Comprehensive metabolic panel [[id number]]  (Abnormal) Collected:  09/27/18 0922    Lab Status:  Final result Specimen:  Blood from Arm, Right Updated:  09/27/18 1001     Sodium 140 mmol/L      Potassium 3 8 mmol/L      Chloride 102 mmol/L      CO2 28 mmol/L      ANION GAP 10 mmol/L      BUN 15 mg/dL      Creatinine 1 72 (H) mg/dL      Glucose 90 mg/dL      Calcium 8 9 mg/dL      AST 19 U/L      ALT 22 U/L      Alkaline Phosphatase 81 U/L      Total Protein 6 9 g/dL      Albumin 3 4 (L) g/dL      Total Bilirubin 1 40 (H) mg/dL      eGFR 38 ml/min/1 73sq m     Narrative:         National Kidney Disease Education Program recommendations are as follows:  GFR calculation is accurate only with a steady state creatinine  Chronic Kidney disease less than 60 ml/min/1 73 sq  meters  Kidney failure less than 15 ml/min/1 73 sq  meters  Magnesium [[id number]]  (Normal) Collected:  09/27/18 0922    Lab Status:  Final result Specimen:  Blood from Arm, Right Updated:  09/27/18 1001     Magnesium 2 0 mg/dL     Lipase [[id number]]  (Normal) Collected:  09/27/18 0922    Lab Status:  Final result Specimen:  Blood from Arm, Right Updated:  09/27/18 1001     Lipase 111 u/L     CBC and differential [[id number]]  (Abnormal) Collected:  09/27/18 0922    Lab Status:  Final result Specimen:  Blood from Arm, Right Updated:  09/27/18 0951     WBC 12 56 (H) Thousand/uL      RBC 4 28 Million/uL      Hemoglobin 13 2 g/dL      Hematocrit 38 4 %      MCV 90 fL      MCH 30 8 pg      MCHC 34 4 g/dL      RDW 12 2 %      MPV 9 5 fL      Platelets 754 Thousands/uL      nRBC 0 /100 WBCs      Neutrophils Relative 77 (H) %      Immat GRANS % 1 %      Lymphocytes Relative 11 (L) %      Monocytes Relative 10 %      Eosinophils Relative 1 %      Basophils Relative 0 %      Neutrophils Absolute 9 88 (H) Thousands/µL      Immature Grans Absolute 0 07 Thousand/uL      Lymphocytes Absolute 1 33 Thousands/µL      Monocytes Absolute 1 19 Thousand/µL      Eosinophils Absolute 0 06 Thousand/µL      Basophils Absolute 0 03 Thousands/µL     Lyme Antibody Profile with reflex to [facility] [[id number]] Collected:  09/27/18 0168    Lab Status:   In process Specimen:  Blood from Arm, Right Updated:  09/27/18 0937                 XR chest 2 views   Final Result by Al Anand Juan Luis Lozano MD (09/27 1302)      Normal chest              Workstation performed: [id number]         US kidney and bladder   Final Result by Minesh Petersen MD (09/27 1140)      Normal renal/bladder ultrasound  Workstation performed: [id number]                    Procedures  Procedures       Phone Contacts  ED Phone Contact    ED Course  ED Course as of Sep 27 1423   Thu Sep 27, 2018   1007 14 5 on labs 2 days ago WBC: (!) 12 56   1008 1 02 on labs 2 days ago Creatinine: (!) 1 72   1200 Per radiology reading: Normal renal/bladder ultrasound  [address]  Number of Diagnoses or Management Options  ANDRA (acute kidney injury) [facility]):      Amount and/or Complexity of Data Reviewed  Tests in the radiology section of CPT®: ordered and reviewed  Tests in the medicine section of CPT®: ordered and reviewed  Decide to obtain previous medical records or to obtain history from someone other than the patient: yes  Obtain history from someone other than the patient: yes  Review and summarize past medical records: yes    Risk of Complications, Morbidity, and/or Mortality  Presenting problems: moderate    Patient Progress  Patient progress: improved    CritCare Time    Disposition  Final diagnoses:   ANDRA (acute kidney injury) ([facility] [address] )     Time reflects when diagnosis was documented in both MDM as applicable and the Disposition within this note     Time User Action Codes Description Comment    9/27/2018 10:10 AM Alisha Neri Add [N17 9] ANDRA (acute kidney injury) ([facility] [address] )     9/27/2018 10:58 AM Rebecca Carmen Add [R10 9] Flank pain       ED Disposition     ED Disposition Condition Comment    Admit  Case was discussed with Saul Vick   and the patient's admission status was agreed to be Admission Status: observation status to the service of Dr Rakel Valiente           Follow-up Information    None         Patient's Medications   Discharge Prescriptions    No medications on file     No discharge procedures on file      ED Provider  Electronically Signed by           Claudetta Rounds, MD  09/27/18 7649

## 2018-09-28 VITALS
OXYGEN SATURATION: 98 % | HEIGHT: 68 IN | HEART RATE: 69 BPM | DIASTOLIC BLOOD PRESSURE: 66 MMHG | BODY MASS INDEX: 20.65 KG/M2 | TEMPERATURE: 99 F | RESPIRATION RATE: 18 BRPM | SYSTOLIC BLOOD PRESSURE: 124 MMHG | WEIGHT: 136.24 LBS

## 2018-09-28 LAB
ANION GAP SERPL CALCULATED.3IONS-SCNC: 9 MMOL/L (ref 4–13)
B BURGDOR IGG SER IA-ACNC: 0.15
B BURGDOR IGM SER IA-ACNC: 0.35
BASOPHILS # BLD AUTO: 0.03 THOUSANDS/ΜL (ref 0–0.1)
BASOPHILS NFR BLD AUTO: 0 % (ref 0–1)
BUN SERPL-MCNC: 10 MG/DL (ref 5–25)
CALCIUM SERPL-MCNC: 7.5 MG/DL (ref 8.3–10.1)
CHLORIDE SERPL-SCNC: 109 MMOL/L (ref 100–108)
CO2 SERPL-SCNC: 22 MMOL/L (ref 21–32)
CREAT SERPL-MCNC: 1.44 MG/DL (ref 0.6–1.3)
EOSINOPHIL # BLD AUTO: 0.09 THOUSAND/ΜL (ref 0–0.61)
EOSINOPHIL NFR BLD AUTO: 1 % (ref 0–6)
ERYTHROCYTE [DISTWIDTH] IN BLOOD BY AUTOMATED COUNT: 12.1 % (ref 11.6–15.1)
GFR SERPL CREATININE-BSD FRML MDRD: 47 ML/MIN/1.73SQ M
GLUCOSE SERPL-MCNC: 89 MG/DL (ref 65–140)
HCT VFR BLD AUTO: 30.6 % (ref 34.8–46.1)
HGB BLD-MCNC: 10.4 G/DL (ref 11.5–15.4)
IMM GRANULOCYTES # BLD AUTO: 0.03 THOUSAND/UL (ref 0–0.2)
IMM GRANULOCYTES NFR BLD AUTO: 0 % (ref 0–2)
LYMPHOCYTES # BLD AUTO: 1.62 THOUSANDS/ΜL (ref 0.6–4.47)
LYMPHOCYTES NFR BLD AUTO: 18 % (ref 14–44)
MAGNESIUM SERPL-MCNC: 1.8 MG/DL (ref 1.6–2.6)
MCH RBC QN AUTO: 30.9 PG (ref 26.8–34.3)
MCHC RBC AUTO-ENTMCNC: 34 G/DL (ref 31.4–37.4)
MCV RBC AUTO: 91 FL (ref 82–98)
MONOCYTES # BLD AUTO: 0.91 THOUSAND/ΜL (ref 0.17–1.22)
MONOCYTES NFR BLD AUTO: 10 % (ref 4–12)
NEUTROPHILS # BLD AUTO: 6.18 THOUSANDS/ΜL (ref 1.85–7.62)
NEUTS SEG NFR BLD AUTO: 71 % (ref 43–75)
NRBC BLD AUTO-RTO: 0 /100 WBCS
PLATELET # BLD AUTO: 139 THOUSANDS/UL (ref 149–390)
PMV BLD AUTO: 9.2 FL (ref 8.9–12.7)
POTASSIUM SERPL-SCNC: 3.6 MMOL/L (ref 3.5–5.3)
PROCALCITONIN SERPL-MCNC: <0.05 NG/ML
RBC # BLD AUTO: 3.37 MILLION/UL (ref 3.81–5.12)
SODIUM SERPL-SCNC: 140 MMOL/L (ref 136–145)
WBC # BLD AUTO: 8.86 THOUSAND/UL (ref 4.31–10.16)

## 2018-09-28 PROCEDURE — 85025 COMPLETE CBC W/AUTO DIFF WBC: CPT | Performed by: FAMILY MEDICINE

## 2018-09-28 PROCEDURE — 83735 ASSAY OF MAGNESIUM: CPT | Performed by: FAMILY MEDICINE

## 2018-09-28 PROCEDURE — 99217 PR OBSERVATION CARE DISCHARGE MANAGEMENT: CPT | Performed by: FAMILY MEDICINE

## 2018-09-28 PROCEDURE — 84145 PROCALCITONIN (PCT): CPT | Performed by: FAMILY MEDICINE

## 2018-09-28 PROCEDURE — 80048 BASIC METABOLIC PNL TOTAL CA: CPT | Performed by: FAMILY MEDICINE

## 2018-09-28 RX ORDER — FAMOTIDINE 20 MG/1
20 TABLET, FILM COATED ORAL 2 TIMES DAILY
Refills: 0
Start: 2018-09-28 | End: 2018-10-12

## 2018-09-28 RX ORDER — FAMOTIDINE 20 MG/1
20 TABLET, FILM COATED ORAL 2 TIMES DAILY
Status: DISCONTINUED | OUTPATIENT
Start: 2018-09-28 | End: 2018-09-28 | Stop reason: HOSPADM

## 2018-09-28 RX ORDER — ONDANSETRON 4 MG/1
4 TABLET, FILM COATED ORAL EVERY 8 HOURS PRN
Qty: 20 TABLET | Refills: 0 | Status: SHIPPED | OUTPATIENT
Start: 2018-09-28 | End: 2018-10-19 | Stop reason: ALTCHOICE

## 2018-09-28 RX ORDER — CEFUROXIME AXETIL 500 MG/1
250 TABLET ORAL EVERY 12 HOURS SCHEDULED
Qty: 10 TABLET | Refills: 0 | Status: SHIPPED | OUTPATIENT
Start: 2018-09-28 | End: 2018-10-03

## 2018-09-28 RX ADMIN — ONDANSETRON 4 MG: 2 INJECTION INTRAMUSCULAR; INTRAVENOUS at 10:17

## 2018-09-28 RX ADMIN — CEFTRIAXONE 1000 MG: 1 INJECTION, SOLUTION INTRAVENOUS at 14:41

## 2018-09-28 RX ADMIN — ONDANSETRON 4 MG: 2 INJECTION INTRAMUSCULAR; INTRAVENOUS at 06:09

## 2018-09-28 RX ADMIN — FAMOTIDINE 20 MG: 20 TABLET, FILM COATED ORAL at 11:57

## 2018-09-28 RX ADMIN — SODIUM CHLORIDE 125 ML/HR: 0.9 INJECTION, SOLUTION INTRAVENOUS at 13:50

## 2018-09-28 RX ADMIN — SODIUM CHLORIDE 125 ML/HR: 0.9 INJECTION, SOLUTION INTRAVENOUS at 05:40

## 2018-09-28 NOTE — PLAN OF CARE
Problem: DISCHARGE PLANNING - CARE MANAGEMENT  Goal: Discharge to post-acute care or home with appropriate resources  INTERVENTIONS:  - Conduct assessment to determine patient/family and health care team treatment goals, and need for post-acute services based on payer coverage, community resources, and patient preferences, and barriers to discharge  - Address psychosocial, clinical, and financial barriers to discharge as identified in assessment in conjunction with the patient/family and health care team  - Arrange appropriate level of post-acute services according to patient's   needs and preference and payer coverage in collaboration with the physician and health care team  - Communicate with and update the patient/family, physician, and health care team regarding progress on the discharge plan  - Arrange appropriate transportation to post-acute venues   Outcome: Completed Date Met: 09/28/18  -MA home with outpatient follow up with PCP (pt will be calling to schedule)

## 2018-09-28 NOTE — SOCIAL WORK
Discussed with patient preferences on discharge;understanding how to manage health at home; purpose of taking medications; importance of follow up care/appointments; and symptoms to watch out for once discharged home  Pt will be calling to schedule a follow up with her PCP

## 2018-09-28 NOTE — DISCHARGE SUMMARY
Discharge Summary - Walton Means 30 y o  female MRN: [id number]    Unit/Bed#: 071-87 Encounter: [id number]    Admission Date:   Admission Orders     Ordered        09/27/18 1205  Place in Observation  Once               Admitting Diagnosis: Abdominal pain [R10 9]  Flank pain [R10 9]  ANDRA (acute kidney injury) ([facility] [address] ) [N17 9]    HPI: Patient is a pleasant 31-year-old female with a past medical history significant for migraines who presents to emergency room today with chief complaint of right flank pain  The patient states she dipped her on urine on 09/21/2018  A provider prescribed Macrobid and the patient completed 4 days without significant improvement  She developed some nausea and vomiting and presented to the emergency room on 09/25/2018 and was sent home on Levaquin  The patient was not able to keep down her 2 tablets of Levaquin due to emesis  She states she has had significant decreased p o  intake  Nausea, vomiting, chills and fevers as high as 101 at home  On exam she is resting comfortably in no acute distress  She is denying headaches  Her right flank pain is positional     Procedures Performed: No orders of the defined types were placed in this encounter  Summary of Hospital Course:     Pyelonephritis:  Patient admitted to medical floor, started on IV fluids, pain control, Rocephin, blood and urine cultures obtained  I suspected her urinalysis being bland due to the fact that she has been on 2 antibiotics prior  Unfortunately her initial urine was not sent for culture  Her back pain completely resolved prior to discharge  She was eating, drinking with epigastric minimal discomfort which I suspect is due to her taking Motrin and receiving Toradol  She will be placed on Pepcid for a week  Acute kidney injury:  Patient started on IV fluids, renal function improved      Significant Findings, Care, Treatment and Services Provided:     Complications: none    Discharge Diagnosis: see above        Condition at Discharge: good         Discharge instructions/Information to patient and family:   See after visit summary for information provided to patient and family  Provisions for Follow-Up Care:  See after visit summary for information related to follow-up care and any pertinent home health orders  PCP: Lupe Godwin DO    Disposition: Home    Planned Readmission: No    Discharge Statement   I spent 25 minutes discharging the patient  This time was spent on the day of discharge  I had direct contact with the patient on the day of discharge  Additional documentation is required if more than 30 minutes were spent on discharge  Discharge Medications:  See after visit summary for reconciled discharge medications provided to patient and family

## 2018-09-28 NOTE — CASE MANAGEMENT
[address] [facility] in the [facility] by Alfredo Erwin for 2017  Network Utilization Review Department  Phone: [phone]; Fax [phone]  ATTENTION: The Network Utilization Review Department is now centralized for our 7 Facilities  Please call with any questions or concerns to [phone] and carefully follow the prompts so that you are directed to the right person  All voicemails are confidential  Fax any determinations, approvals, denials, and requests for initial or continue stay review clinical to [phone]  Due to HIGH CALL volume, it would be easier if you could please send faxed requests to expedite your requests and in part, help us provide discharge notifications faster   /////////////////////////////////////////////////////////////////////////////////////////////////////////////////      Initial Clinical Review    Admit OBS  9/27 @  1206    Orders Placed This Encounter   Procedures    Place in Observation     Standing Status:   Standing     Number of Occurrences:   1     Order Specific Question:   Admitting Physician     Answer:   Fatmata Rojas     Order Specific Question:   Level of Care     Answer:   Med Surg [16]         ED: Date/Time/Mode of Arrival:   ED Arrival Information     Expected Arrival Acuity Means of Arrival Escorted By Service Admission Type    - 9/27/2018 08:41 Urgent Walk-In Family Member General Medicine Urgent    Arrival Complaint    Abdominal Pain          Chief Complaint:   Chief Complaint   Patient presents with    Abdominal Pain     Abdominal pain, vomiting, recently put on levaquin for ovarian cyst       History of Illness: 53-year-old female with a past medical history significant for migraines who presents to emergency room today with chief complaint of right flank pain  Three weeks ago she was diagnosed with a UTI and treated with Macrobid  She says that she never got better from that    On September 24th she was seen at [facility] for a headache  She received a head CT  She received medication for the headache, experience good relief, and was discharged home  On September 25th she was seen again at [facility], this time for back pain  She also had fevers and chills as well as body aches at that time  She had a CT scan which was negative except for small amount of free fluid in the pelvis  Her white count was 14 5  She was diagnosed with pyelonephritis  She was discharged with Levaquin - not able to keep down her 2 tablets of Levaquin due to emesis  She states she has had significant decreased p o  intake    Nausea, vomiting, chills and fevers as high as 101 at home      ED Vital Signs:   ED Triage Vitals   Temperature Pulse Respirations Blood Pressure SpO2   09/27/18 0853 09/27/18 0853 09/27/18 0853 09/27/18 0853 09/27/18 0853   98 6 °F (37 °C) 70 18 140/77 100 %      Temp Source Heart Rate Source Patient Position - Orthostatic VS BP Location FiO2 (%)   09/27/18 0853 09/27/18 0853 09/27/18 0853 09/27/18 0853 --   Temporal Monitor Lying Right arm       Pain Score       09/27/18 0953       6        Wt Readings from Last 1 Encounters:   09/27/18 61 8 kg (136 lb 3 9 oz)     Abnormal Labs/Diagnostic Test Results:   crt  1 72   1 44  Tot bili  1 40  Wbc  12 56   8 86  UA  1 020 sg,  2+ ketones, 2+ protein,  Mod bacteria, 2-4 wbc      ED Treatment:   Medication Administration from 09/27/2018 0841 to 09/27/2018 1447       Date/Time Order Dose Route Action Action by Comments     09/27/2018 0926 ondansetron (ZOFRAN) injection 4 mg 4 mg Intravenous Given Claudia Monson RN                 09/27/2018 0926 sodium chloride 0 9 % bolus 1,000 mL 1,000 mL Intravenous [address] Claudia Monson RN      09/27/2018 7297 ketorolac (TORADOL) injection 15 mg 15 mg Intravenous Given Claudia Monson RN                 09/27/2018 1041 sodium chloride 0 9 % bolus 1,000 mL 1,000 mL Intravenous [address] 37 Claudia Monson RN      09/27/2018 1353 sodium chloride 0 9 % infusion 125 mL/hr Intravenous New Bag Ladonna Perez RN         Admitting Diagnosis: Abdominal pain [R10 9]  Flank pain [R10 9]  [facility] [N17 9]    Assessment/Plan:   Pyelonephritis   Assessment & Plan     High probability that urine is sterile given she has been on macrobid and levaquin  Will still pursue a urine culture  Empirically treat with rocephin     ANDRA (acute kidney injury) ([facility] )   Assessment & Plan     Check urine sodium and urine creatinine  NS @ 125cc/hr   No sign of obstructive uropathy on U S   Monitor output       History of migraine   Assessment & Plan     Currently without a headache  P r n   Imitrex       9/27   Admission Orders:  Admit medical - OBS   Sequential compression device to b/l LE  Urine cx  I/O q shift  Out of bed as tolerated  Reg diet  IVF @ 125  IV zofran prn -   given @ 9014    Scheduled Meds:   Current Facility-Administered Medications:  cefTRIAXone 1,000 mg Intravenous Q24H Elisha Rowland MD Last Rate: Stopped (09/27/18 1633)   lidocaine 1 patch Topical Daily Elisha Rowland MD    ondansetron 4 mg Intravenous Q4H PRN Elisha Rowland MD    sodium chloride 125 mL/hr Intravenous Continuous Skyler Molina DO Last Rate: 125 mL/hr (09/28/18 0540)   traMADol 50 mg Oral Q6H PRN Elisha Rowland MD    zolpidem 5 mg Oral HS PRN Elisha Rowland MD      9/28/2018  99 0    69    18    124/66   Since admission,  1x small emesis     IV zofran @ 5411

## 2018-09-28 NOTE — SOCIAL WORK
Cm met with the patient to evaluate the patients prior function and living situation and any barriers to d/c and form a safe d/c plan  Cm also evaluated the patient for any services in the home or needs for services  Pt resides at home with her parents in a house  She is independent with all adls and ambulation  Pt works and drives  PCP is Metta Leyden and pharmacy is [facility]  Pt plans on returning home on dc with outpatient follow up  Cm will continue to follow and assist in dc planning

## 2018-09-29 LAB — BACTERIA UR CULT: ABNORMAL

## 2018-09-30 ENCOUNTER — OFFICE VISIT (OUTPATIENT)
Dept: URGENT CARE | Facility: CLINIC | Age: 38
End: 2018-09-30
Payer: COMMERCIAL

## 2018-09-30 VITALS
BODY MASS INDEX: 21.52 KG/M2 | OXYGEN SATURATION: 99 % | DIASTOLIC BLOOD PRESSURE: 81 MMHG | HEART RATE: 74 BPM | TEMPERATURE: 98.7 F | HEIGHT: 68 IN | SYSTOLIC BLOOD PRESSURE: 139 MMHG | RESPIRATION RATE: 18 BRPM | WEIGHT: 142 LBS

## 2018-09-30 DIAGNOSIS — R06.02 SHORTNESS OF BREATH: Primary | ICD-10-CM

## 2018-09-30 PROCEDURE — S9088 SERVICES PROVIDED IN URGENT: HCPCS | Performed by: PHYSICIAN ASSISTANT

## 2018-09-30 PROCEDURE — 99213 OFFICE O/P EST LOW 20 MIN: CPT | Performed by: PHYSICIAN ASSISTANT

## 2018-09-30 RX ORDER — CHOLESTYRAMINE LIGHT 4 G/5.7G
POWDER, FOR SUSPENSION ORAL
COMMUNITY
Start: 2017-08-28 | End: 2018-10-01

## 2018-09-30 RX ORDER — SUMATRIPTAN 100 MG/1
1 TABLET, FILM COATED ORAL ONCE AS NEEDED
COMMUNITY
Start: 2017-08-28 | End: 2019-09-12

## 2018-09-30 NOTE — PROGRESS NOTES
[facility]        NAME: Marcelo Shaw is a 30 y o  female  : 1980    MRN: [id number]  DATE: 2018  TIME: 4:05 PM    Assessment and Plan   Shortness of breath [R06 02]  1  Shortness of breath  Transfer to other facility     Patient sent to ER for reevaluation to include chest x-ray and repeat labs  Patient Instructions       Go immediately to ER    Chief Complaint     Chief Complaint   Patient presents with    Cold Like Symptoms     cough, shortness of breath          History of Present Illness       Patient presents with new onset shortness of breath  Shortness of breath is worse when she is laying flat  Shortness breath is relieved by sitting straight up  Patient denies any dizziness  Patient denies any wheezing  Patient states that she was recently admitted for ANDRA secondary to pyelonephritis and discharged 2 days ago  Patient was given numerous bags of fluids at this time  Patient states that since she has been she had a decrease in urination  She feels that she is retaining most the fluids that they gave her IV and not urinating it out  Patient denies any swelling of lower extremities  Patient denies any chest pain  Patient states that she feels her heart is racing  Review of Systems   Review of Systems   Constitutional: Negative for chills, diaphoresis, fatigue and fever  HENT: Negative for congestion, ear pain, facial swelling, postnasal drip, rhinorrhea, trouble swallowing and voice change  Eyes: Negative  Respiratory: Positive for shortness of breath  Negative for cough, chest tightness and wheezing  Cardiovascular: Negative for chest pain, palpitations and leg swelling  Gastrointestinal: Negative for abdominal pain, constipation, diarrhea, nausea and vomiting  Endocrine: Negative  Genitourinary: Positive for decreased urine volume, difficulty urinating and flank pain  Negative for dysuria and urgency     Musculoskeletal: Negative for back pain, myalgias and neck pain  Skin: Negative for pallor and rash  Allergic/Immunologic: Negative  Neurological: Negative for dizziness, syncope and headaches  Hematological: Negative  Psychiatric/Behavioral: Negative            Current Medications       Current Outpatient Prescriptions:     cefuroxime (CEFTIN) 500 mg tablet, Take 0 5 tablets (250 mg total) by mouth every 12 (twelve) hours for 5 days, Disp: 10 tablet, Rfl: 0    cholestyramine light (PREVALITE) 4 GM/DOSE powder, Take by mouth, Disp: , Rfl:     cholestyramine sugar free (QUESTRAN LIGHT) 4 g packet, Take 4 g by mouth as needed, Disp: , Rfl:     famotidine (PEPCID) 20 mg tablet, Take 1 tablet (20 mg total) by mouth 2 (two) times a day, Disp: , Rfl: 0    ondansetron (ZOFRAN) 4 mg tablet, Take 1 tablet (4 mg total) by mouth every 8 (eight) hours as needed for nausea or vomiting, Disp: 20 tablet, Rfl: 0    SUMAtriptan (IMITREX) 100 mg tablet, Take 1 tablet (100 mg total) by mouth once as needed for migraine for up to 1 dose, Disp: 30 tablet, Rfl: 1    SUMAtriptan (IMITREX) 100 mg tablet, Take 1 tablet by mouth, Disp: , Rfl:     traMADol (ULTRAM) 50 mg tablet, Take 1 tablet (50 mg total) by mouth every 4 (four) hours for 20 doses, Disp: 20 tablet, Rfl: 0    Current Allergies     Allergies as of 09/30/2018 - Reviewed 09/30/2018   Allergen Reaction Noted    Hydrocodone Anaphylaxis 09/27/2018    Morphine Palpitations     Morphine and related Anaphylaxis and Palpitations 02/12/2014    Oxycodone Anaphylaxis 09/27/2018    Penicillins Fever and Other (See Comments) 02/12/2014    Clomid [clomiphene]  09/25/2018    Sulfa antibiotics Fever 08/28/2017            The following portions of the patient's history were reviewed and updated as appropriate: allergies, current medications, past family history, past medical history, past social history, past surgical history and problem list      Past Medical History:   Diagnosis Date    Migraine Past Surgical History:   Procedure Laterality Date    APPENDECTOMY      TONSILLECTOMY         No family history on file  Medications have been verified  Objective   /81   Pulse 74   Temp 98 7 °F (37 1 °C)   Resp 18   Ht 5' 8" (1 727 m)   Wt 64 4 kg (142 lb)   LMP 09/15/2018   SpO2 99%   BMI 21 59 kg/m²        Physical Exam     Physical Exam   Constitutional: She appears well-developed and well-nourished  No distress  HENT:   Head: Normocephalic and atraumatic  TM intact and pearly bilaterally  Clear nasal discharge bilaterally  Swollen turbinates  Postnasal discharge and erythematous posterior pharynx  Cardiovascular: Normal rate, regular rhythm, normal heart sounds and intact distal pulses  Pulmonary/Chest: Effort normal and breath sounds normal  No respiratory distress  She has no wheezes  She has no rales  Abdominal: Soft  Bowel sounds are normal  She exhibits no distension  There is no tenderness  There is no guarding  Bilateral CVA tenderness   Skin: Skin is warm  No rash noted  She is not diaphoretic  There is pallor  Nursing note and vitals reviewed

## 2018-10-01 ENCOUNTER — TRANSITIONAL CARE MANAGEMENT (OUTPATIENT)
Dept: INTERNAL MEDICINE CLINIC | Facility: CLINIC | Age: 38
End: 2018-10-01

## 2018-10-01 ENCOUNTER — OFFICE VISIT (OUTPATIENT)
Dept: INTERNAL MEDICINE CLINIC | Facility: CLINIC | Age: 38
End: 2018-10-01
Payer: COMMERCIAL

## 2018-10-01 ENCOUNTER — TELEPHONE (OUTPATIENT)
Dept: INTERNAL MEDICINE CLINIC | Facility: CLINIC | Age: 38
End: 2018-10-01

## 2018-10-01 VITALS
HEART RATE: 69 BPM | OXYGEN SATURATION: 98 % | WEIGHT: 133 LBS | SYSTOLIC BLOOD PRESSURE: 116 MMHG | BODY MASS INDEX: 20.16 KG/M2 | HEIGHT: 68 IN | TEMPERATURE: 99.9 F | DIASTOLIC BLOOD PRESSURE: 60 MMHG

## 2018-10-01 DIAGNOSIS — IMO0001 TRANSITION OF CARE PERFORMED WITH SHARING OF CLINICAL SUMMARY: Primary | ICD-10-CM

## 2018-10-01 DIAGNOSIS — R60.0 EDEMA OF LEFT LOWER EXTREMITY: ICD-10-CM

## 2018-10-01 DIAGNOSIS — R06.02 SOB (SHORTNESS OF BREATH): ICD-10-CM

## 2018-10-01 DIAGNOSIS — R53.81 MALAISE AND FATIGUE: ICD-10-CM

## 2018-10-01 DIAGNOSIS — R53.83 MALAISE AND FATIGUE: ICD-10-CM

## 2018-10-01 DIAGNOSIS — N17.9 AKI (ACUTE KIDNEY INJURY) (HCC): ICD-10-CM

## 2018-10-01 DIAGNOSIS — R05.9 COUGH: ICD-10-CM

## 2018-10-01 DIAGNOSIS — N12 PYELONEPHRITIS: ICD-10-CM

## 2018-10-01 PROBLEM — K52.9 CHRONIC DIARRHEA: Status: ACTIVE | Noted: 2017-08-28

## 2018-10-01 PROBLEM — N83.201 CYST OF RIGHT OVARY: Status: ACTIVE | Noted: 2017-09-15

## 2018-10-01 PROBLEM — N87.9 CERVICAL DYSPLASIA: Status: ACTIVE | Noted: 2017-08-28

## 2018-10-01 PROBLEM — M72.2 PLANTAR FASCIITIS: Status: ACTIVE | Noted: 2017-06-08

## 2018-10-01 PROBLEM — M79.673 PAIN OF PLANTAR ASPECT OF HEEL: Status: ACTIVE | Noted: 2017-06-08

## 2018-10-01 PROBLEM — G43.909 MIGRAINES: Status: ACTIVE | Noted: 2017-08-28

## 2018-10-01 PROBLEM — J45.990 BRONCHOSPASM, EXERCISE-INDUCED: Status: ACTIVE | Noted: 2017-08-28

## 2018-10-01 LAB
BACTERIA BLD CULT: NORMAL
BACTERIA BLD CULT: NORMAL
BACTERIA UR QL AUTO: NORMAL /HPF
BILIRUB UR QL STRIP: NEGATIVE
CLARITY UR: CLEAR
COLOR UR: YELLOW
GLUCOSE UR STRIP-MCNC: NEGATIVE MG/DL
HGB UR QL STRIP.AUTO: ABNORMAL
HYALINE CASTS #/AREA URNS LPF: NORMAL /LPF
KETONES UR STRIP-MCNC: NEGATIVE MG/DL
LEUKOCYTE ESTERASE UR QL STRIP: NEGATIVE
NITRITE UR QL STRIP: NEGATIVE
NON-SQ EPI CELLS URNS QL MICRO: NORMAL /HPF
PH UR STRIP.AUTO: 5.5 [PH] (ref 4.5–8)
PROT UR STRIP-MCNC: NEGATIVE MG/DL
RBC #/AREA URNS AUTO: NORMAL /HPF
SL AMB  POCT GLUCOSE, UA: ABNORMAL
SL AMB LEUKOCYTE ESTERASE,UA: ABNORMAL
SL AMB POCT BILIRUBIN,UA: ABNORMAL
SL AMB POCT BLOOD,UA: ABNORMAL
SL AMB POCT CLARITY,UA: CLEAR
SL AMB POCT COLOR,UA: CLEAR
SL AMB POCT KETONES,UA: ABNORMAL
SL AMB POCT NITRITE,UA: ABNORMAL
SL AMB POCT PH,UA: 6
SL AMB POCT SPECIFIC GRAVITY,UA: 1.02
SL AMB POCT URINE PROTEIN: ABNORMAL
SL AMB POCT UROBILINOGEN: NORMAL
SP GR UR STRIP.AUTO: 1.01 (ref 1–1.03)
UROBILINOGEN UR QL STRIP.AUTO: 0.2 E.U./DL
WBC #/AREA URNS AUTO: NORMAL /HPF

## 2018-10-01 PROCEDURE — 81002 URINALYSIS NONAUTO W/O SCOPE: CPT | Performed by: NURSE PRACTITIONER

## 2018-10-01 PROCEDURE — 81001 URINALYSIS AUTO W/SCOPE: CPT | Performed by: NURSE PRACTITIONER

## 2018-10-01 PROCEDURE — 93000 ELECTROCARDIOGRAM COMPLETE: CPT | Performed by: NURSE PRACTITIONER

## 2018-10-01 PROCEDURE — 99496 TRANSJ CARE MGMT HIGH F2F 7D: CPT | Performed by: NURSE PRACTITIONER

## 2018-10-01 NOTE — PROGRESS NOTES
Transition of Care  Follow-up After Hospitalization    aJnine Harry 30 y o  female   Date:  10/1/2018    Date and time hospital follow up call was made:  10/1/2018  8:40 AM  Patient was hopsitalized at:  [facility]  Date of admission:  9/27/18  Date of discharge:  9/28/18  Diagnosis:  kidney infection, renal failure  I have advised the patient to call PCP with any new or worsening symptoms (please type in name along with any credentials):  [facility] records were reviewed  Medications upon discharge reviewed/updated  Yes , Zofran, Ceftin   Discharge Disposition:  Still recuperating  Follow up visits with other specialists: PCP      Assessment and Plan:  Medical record reviewed, pt here today for RANDI with c/o ongoing intermittent fevers high as 100, is taking Ceftin since d/c 9/28/18 drinking plenty of water and resting  Pt reports she feels 'like I have the flu"  General malaise, weakness and overall body aches  Pt also reports hx of bilateral lower extremity edema that has extended to sacral area when she lies down but returns to lower extremities when she is on her feet  I did observe the sacral edema  US kidney was normal  Chest xray completed in [address] [facility] was also normal  Will check EKG and echocardiagram as well as rheumatology panel regarding lower extremity edema  Pt is very thin with BMI 20 22 with no FH of autoimmune disease     Milan Menaitri was seen today for transition of care management      Diagnoses and all orders for this visit:    Transition of care performed with sharing of clinical summary  Comments:  Completed today    Pyelonephritis  Comments:  UA pos for blood, will send for UA and C&S follow up  Orders:  -     Cancel: UA w Reflex to Microscopic w Reflex to Culture  -     POCT urine dip    [facility]  Comments:  labwork ordered  Orders:  -     Cancel: UA w Reflex to Microscopic w Reflex to Culture  -     Echo complete with contrast if indicated; Future  -     Cancel: XR chest pa & lateral; Future  -     POCT urine dip  -     UA w Reflex to Microscopic w Reflex to Culture - Clinic Collect    Edema of left lower extremity  Comments:  Echocardiagram, EKG - Sinus bradycardia labwork ordered  Orders:  -     Echo complete with contrast if indicated; Future  -     Cancel: XR chest pa & lateral; Future  -     POCT ECG  -     BERNARDINO Scr, IFA W/Refl Titer/Pattern/Lupus Pnl 3; Future  -     CBC and differential; Future  -     Cancel: Lyme Antibody Profile with reflex to WB; Future  -     RF Screen w/ Reflex to Titer; Future  -     Sedimentation rate, automated; Future  -     Sjogren's Antibodies; Future  -     Basic metabolic panel; Future  -     UA w Reflex to Microscopic w Reflex to Culture - Clinic Collect    SOB (shortness of breath)  Comments:  Chest xray normal 9/27/18  Orders:  -     Cancel: XR chest pa & lateral; Future  -     POCT ECG  -     BERNARDINO Scr, IFA W/Refl Titer/Pattern/Lupus Pnl 3; Future  -     CBC and differential; Future  -     Cancel: Lyme Antibody Profile with reflex to WB; Future  -     RF Screen w/ Reflex to Titer; Future  -     Sedimentation rate, automated; Future  -     Sjogren's Antibodies; Future  -     Basic metabolic panel; Future    Cough  Comments:  Chest xray normal 9/27/18  Orders:  -     POCT ECG  -     BERNARDINO Scr, IFA W/Refl Titer/Pattern/Lupus Pnl 3; Future  -     CBC and differential; Future  -     Cancel: Lyme Antibody Profile with reflex to WB; Future  -     RF Screen w/ Reflex to Titer; Future  -     Sedimentation rate, automated; Future  -     Sjogren's Antibodies; Future  -     Basic metabolic panel; Future    Malaise and fatigue  Comments:  Labwork ordered  Orders:  -     POCT ECG  -     BERNARDINO Scr, IFA W/Refl Titer/Pattern/Lupus Pnl 3; Future  -     CBC and differential; Future  -     Cancel: Lyme Antibody Profile with reflex to WB; Future  -     RF Screen w/ Reflex to Titer;  Future  -     Sedimentation rate, automated; Future  -     Sjogren's Antibodies; Future  -     TSH, 3rd generation with Free T4 reflex; Future  -     Basic metabolic panel; Future  -     POCT urine dip            HPI:  Urinary Tract Infection    This is a new problem  The current episode started 1 to 4 weeks ago  Episode frequency: none  The problem has been gradually improving  The quality of the pain is described as aching, burning, shooting and stabbing  The pain is at a severity of 6/10  The pain is mild  Maximum temperature:   The fever has been present for 5 days or more  She is sexually active  There is no history of pyelonephritis (pyelonephritis at present)  Associated symptoms include chills, flank pain, hematuria, nausea and vomiting  Pertinent negatives include no frequency  Associated symptoms comments: intermittent  She has tried antibiotics and increased fluids (hospitalization) for the symptoms  The treatment provided mild relief  Her past medical history is significant for recurrent UTIs  ROS: Review of Systems   Constitutional: Positive for chills  Negative for fatigue  HENT: Negative for congestion, postnasal drip, rhinorrhea, sinus pain, sore throat and trouble swallowing  Eyes: Negative for pain and visual disturbance  Respiratory: Negative for cough, shortness of breath and wheezing  Cardiovascular: Negative for chest pain and palpitations  Gastrointestinal: Positive for nausea and vomiting  Negative for constipation and diarrhea  Endocrine: Negative for polydipsia, polyphagia and polyuria  Genitourinary: Positive for flank pain and hematuria  Negative for difficulty urinating, frequency and pelvic pain  Musculoskeletal: Negative for arthralgias, back pain, joint swelling and myalgias  Skin: Negative  Negative for color change and rash  Neurological: Negative for dizziness, syncope, weakness, light-headedness, numbness and headaches  Hematological: Negative for adenopathy  Does not bruise/bleed easily  Psychiatric/Behavioral: Negative for behavioral problems and sleep disturbance  The patient is not nervous/anxious  Past Medical History:   Diagnosis Date    Migraine        Past Surgical History:   Procedure Laterality Date    APPENDECTOMY      TONSILLECTOMY         Social History     Social History    Marital status: Single     Spouse name: N/A    Number of children: N/A    Years of education: N/A     Social History Main Topics    Smoking status: Never Smoker    Smokeless tobacco: Never Used    Alcohol use No    Drug use: No    Sexual activity: Yes     Other Topics Concern    Not on file     Social History Narrative    No narrative on file       No family history on file  Allergies   Allergen Reactions    Hydrocodone Anaphylaxis    Morphine Palpitations    Morphine And Related Anaphylaxis and Palpitations    Oxycodone Anaphylaxis    Penicillins Fever and Other (See Comments)    Clomid [Clomiphene]     Sulfa Antibiotics Fever         Current Outpatient Prescriptions:     cefuroxime (CEFTIN) 500 mg tablet, Take 0 5 tablets (250 mg total) by mouth every 12 (twelve) hours for 5 days, Disp: 10 tablet, Rfl: 0    cholestyramine sugar free (QUESTRAN LIGHT) 4 g packet, Take 4 g by mouth as needed, Disp: , Rfl:     famotidine (PEPCID) 20 mg tablet, Take 1 tablet (20 mg total) by mouth 2 (two) times a day, Disp: , Rfl: 0    ondansetron (ZOFRAN) 4 mg tablet, Take 1 tablet (4 mg total) by mouth every 8 (eight) hours as needed for nausea or vomiting, Disp: 20 tablet, Rfl: 0    SUMAtriptan (IMITREX) 100 mg tablet, Take 1 tablet by mouth, Disp: , Rfl:       Physical Exam:  /60   Pulse 69   Temp 99 9 °F (37 7 °C)   Ht 5' 8" (1 727 m)   Wt 60 3 kg (133 lb)   LMP 09/15/2018   SpO2 98%   BMI 20 22 kg/m²     Physical Exam   Constitutional: She is oriented to person, place, and time  She appears well-developed and well-nourished  HENT:   Head: Normocephalic     Eyes: Pupils are equal, round, and reactive to light  Neck: Normal range of motion  Cardiovascular: Normal rate and regular rhythm  Exam reveals distant heart sounds  Sacral edema also intermittent bilateral lower leg edema   Pulmonary/Chest: Effort normal and breath sounds normal    Abdominal: Soft  Bowel sounds are normal    Musculoskeletal: Normal range of motion  Neurological: She is alert and oriented to person, place, and time  Skin: Skin is warm and dry  Psychiatric: She has a normal mood and affect  Her behavior is normal  Judgment and thought content normal    Nursing note and vitals reviewed            Labs:  Lab Results   Component Value Date    WBC 8 86 09/28/2018    HGB 10 4 (L) 09/28/2018    HCT 30 6 (L) 09/28/2018    MCV 91 09/28/2018     (L) 09/28/2018     Lab Results   Component Value Date     09/28/2018    K 3 6 09/28/2018     (H) 09/28/2018    CO2 22 09/28/2018    BUN 10 09/28/2018    CREATININE 1 44 (H) 09/28/2018    CALCIUM 7 5 (L) 09/28/2018    AST 19 09/27/2018    ALT 22 09/27/2018    ALKPHOS 81 09/27/2018    EGFR 47 09/28/2018

## 2018-10-01 NOTE — TELEPHONE ENCOUNTER
Need tcm note started, pt admitted to [facility] on   9-27-18, dc 9-28-18, dx kidney inf, acute renal failure, has appt today

## 2018-10-02 ENCOUNTER — TRANSCRIBE ORDERS (OUTPATIENT)
Dept: LAB | Facility: CLINIC | Age: 38
End: 2018-10-02

## 2018-10-02 ENCOUNTER — TELEPHONE (OUTPATIENT)
Dept: INTERNAL MEDICINE CLINIC | Facility: CLINIC | Age: 38
End: 2018-10-02

## 2018-10-02 ENCOUNTER — APPOINTMENT (OUTPATIENT)
Dept: RADIOLOGY | Facility: CLINIC | Age: 38
End: 2018-10-02
Payer: COMMERCIAL

## 2018-10-02 ENCOUNTER — APPOINTMENT (OUTPATIENT)
Dept: LAB | Facility: CLINIC | Age: 38
End: 2018-10-02
Payer: COMMERCIAL

## 2018-10-02 DIAGNOSIS — N17.9 AKI (ACUTE KIDNEY INJURY) (HCC): ICD-10-CM

## 2018-10-02 DIAGNOSIS — R60.0 EDEMA OF LEFT LOWER EXTREMITY: Primary | ICD-10-CM

## 2018-10-02 DIAGNOSIS — R53.83 MALAISE AND FATIGUE: ICD-10-CM

## 2018-10-02 DIAGNOSIS — R06.02 SOB (SHORTNESS OF BREATH): ICD-10-CM

## 2018-10-02 DIAGNOSIS — R53.81 MALAISE AND FATIGUE: ICD-10-CM

## 2018-10-02 DIAGNOSIS — R05.9 COUGH: ICD-10-CM

## 2018-10-02 DIAGNOSIS — R60.0 EDEMA OF LEFT LOWER EXTREMITY: ICD-10-CM

## 2018-10-02 DIAGNOSIS — N12 PYELONEPHRITIS: ICD-10-CM

## 2018-10-02 LAB
ANION GAP SERPL CALCULATED.3IONS-SCNC: 6 MMOL/L (ref 4–13)
BACTERIA BLD CULT: NORMAL
BACTERIA BLD CULT: NORMAL
BACTERIA UR QL AUTO: NORMAL /HPF
BASOPHILS # BLD AUTO: 0.03 THOUSANDS/ΜL (ref 0–0.1)
BASOPHILS NFR BLD AUTO: 0 % (ref 0–1)
BILIRUB UR QL STRIP: NEGATIVE
BUN SERPL-MCNC: 5 MG/DL (ref 5–25)
CALCIUM SERPL-MCNC: 8.5 MG/DL (ref 8.3–10.1)
CHLORIDE SERPL-SCNC: 104 MMOL/L (ref 100–108)
CLARITY UR: CLEAR
CO2 SERPL-SCNC: 31 MMOL/L (ref 21–32)
COLOR UR: YELLOW
CREAT SERPL-MCNC: 1.28 MG/DL (ref 0.6–1.3)
EOSINOPHIL # BLD AUTO: 0.28 THOUSAND/ΜL (ref 0–0.61)
EOSINOPHIL NFR BLD AUTO: 3 % (ref 0–6)
ERYTHROCYTE [DISTWIDTH] IN BLOOD BY AUTOMATED COUNT: 12.4 % (ref 11.6–15.1)
ERYTHROCYTE [SEDIMENTATION RATE] IN BLOOD: 29 MM/HOUR (ref 0–20)
GFR SERPL CREATININE-BSD FRML MDRD: 54 ML/MIN/1.73SQ M
GLUCOSE SERPL-MCNC: 70 MG/DL (ref 65–140)
GLUCOSE UR STRIP-MCNC: NEGATIVE MG/DL
HCT VFR BLD AUTO: 38.1 % (ref 34.8–46.1)
HGB BLD-MCNC: 12.8 G/DL (ref 11.5–15.4)
HGB UR QL STRIP.AUTO: ABNORMAL
HYALINE CASTS #/AREA URNS LPF: NORMAL /LPF
IMM GRANULOCYTES # BLD AUTO: 0.03 THOUSAND/UL (ref 0–0.2)
IMM GRANULOCYTES NFR BLD AUTO: 0 % (ref 0–2)
KETONES UR STRIP-MCNC: NEGATIVE MG/DL
LEUKOCYTE ESTERASE UR QL STRIP: NEGATIVE
LYMPHOCYTES # BLD AUTO: 1.53 THOUSANDS/ΜL (ref 0.6–4.47)
LYMPHOCYTES NFR BLD AUTO: 16 % (ref 14–44)
MCH RBC QN AUTO: 30.3 PG (ref 26.8–34.3)
MCHC RBC AUTO-ENTMCNC: 33.6 G/DL (ref 31.4–37.4)
MCV RBC AUTO: 90 FL (ref 82–98)
MONOCYTES # BLD AUTO: 0.81 THOUSAND/ΜL (ref 0.17–1.22)
MONOCYTES NFR BLD AUTO: 9 % (ref 4–12)
NEUTROPHILS # BLD AUTO: 6.68 THOUSANDS/ΜL (ref 1.85–7.62)
NEUTS SEG NFR BLD AUTO: 72 % (ref 43–75)
NITRITE UR QL STRIP: NEGATIVE
NON-SQ EPI CELLS URNS QL MICRO: NORMAL /HPF
NRBC BLD AUTO-RTO: 0 /100 WBCS
PH UR STRIP.AUTO: 6.5 [PH] (ref 4.5–8)
PLATELET # BLD AUTO: 212 THOUSANDS/UL (ref 149–390)
PMV BLD AUTO: 10.2 FL (ref 8.9–12.7)
POTASSIUM SERPL-SCNC: 3 MMOL/L (ref 3.5–5.3)
PROT UR STRIP-MCNC: NEGATIVE MG/DL
RBC # BLD AUTO: 4.22 MILLION/UL (ref 3.81–5.12)
RBC #/AREA URNS AUTO: NORMAL /HPF
SODIUM SERPL-SCNC: 141 MMOL/L (ref 136–145)
SP GR UR STRIP.AUTO: 1.01 (ref 1–1.03)
TSH SERPL DL<=0.05 MIU/L-ACNC: 3.57 UIU/ML (ref 0.36–3.74)
UROBILINOGEN UR QL STRIP.AUTO: 0.2 E.U./DL
WBC # BLD AUTO: 9.36 THOUSAND/UL (ref 4.31–10.16)
WBC #/AREA URNS AUTO: NORMAL /HPF

## 2018-10-02 PROCEDURE — 86235 NUCLEAR ANTIGEN ANTIBODY: CPT

## 2018-10-02 PROCEDURE — 86160 COMPLEMENT ANTIGEN: CPT

## 2018-10-02 PROCEDURE — 86038 ANTINUCLEAR ANTIBODIES: CPT

## 2018-10-02 PROCEDURE — 81001 URINALYSIS AUTO W/SCOPE: CPT | Performed by: NURSE PRACTITIONER

## 2018-10-02 PROCEDURE — 84443 ASSAY THYROID STIM HORMONE: CPT

## 2018-10-02 PROCEDURE — 71046 X-RAY EXAM CHEST 2 VIEWS: CPT

## 2018-10-02 PROCEDURE — 36415 COLL VENOUS BLD VENIPUNCTURE: CPT

## 2018-10-02 PROCEDURE — 80048 BASIC METABOLIC PNL TOTAL CA: CPT

## 2018-10-02 PROCEDURE — 86255 FLUORESCENT ANTIBODY SCREEN: CPT

## 2018-10-02 PROCEDURE — 86430 RHEUMATOID FACTOR TEST QUAL: CPT

## 2018-10-02 PROCEDURE — 86162 COMPLEMENT TOTAL (CH50): CPT

## 2018-10-02 PROCEDURE — 85025 COMPLETE CBC W/AUTO DIFF WBC: CPT

## 2018-10-02 PROCEDURE — 85652 RBC SED RATE AUTOMATED: CPT

## 2018-10-02 PROCEDURE — 86618 LYME DISEASE ANTIBODY: CPT

## 2018-10-02 NOTE — TELEPHONE ENCOUNTER
Pt states when she into see you, she would like to extend her absence from work until Thursday  Is it ok to give note for her? Please advise  Thanks

## 2018-10-02 NOTE — TELEPHONE ENCOUNTER
Pt is aware will stop in on Thursday for note, or if she needs it extended further out she will let us know then

## 2018-10-03 ENCOUNTER — TELEPHONE (OUTPATIENT)
Dept: INTERNAL MEDICINE CLINIC | Facility: CLINIC | Age: 38
End: 2018-10-03

## 2018-10-03 LAB
B BURGDOR IGG SER IA-ACNC: 0.11
B BURGDOR IGM SER IA-ACNC: 0.19
ENA SS-A AB SER-ACNC: <0.2 AI (ref 0–0.9)
ENA SS-B AB SER-ACNC: <0.2 AI (ref 0–0.9)
RHEUMATOID FACT SER QL LA: NEGATIVE

## 2018-10-04 DIAGNOSIS — B37.0 ORAL CANDIDA: Primary | ICD-10-CM

## 2018-10-04 NOTE — TELEPHONE ENCOUNTER
Please call pt and tell her that I sent Nystatin oral susp for her mouth to [facility]

## 2018-10-09 ENCOUNTER — HOSPITAL ENCOUNTER (OUTPATIENT)
Dept: NON INVASIVE DIAGNOSTICS | Facility: HOSPITAL | Age: 38
Discharge: HOME | End: 2018-10-09
Payer: COMMERCIAL

## 2018-10-09 DIAGNOSIS — R60.0 EDEMA OF LEFT LOWER EXTREMITY: ICD-10-CM

## 2018-10-09 DIAGNOSIS — N17.9 AKI (ACUTE KIDNEY INJURY) (HCC): ICD-10-CM

## 2018-10-09 LAB — MISCELLANEOUS LAB TEST RESULT: NORMAL

## 2018-10-09 PROCEDURE — 93306 TTE W/DOPPLER COMPLETE: CPT | Performed by: INTERNAL MEDICINE

## 2018-10-09 PROCEDURE — 93306 TTE W/DOPPLER COMPLETE: CPT

## 2018-10-10 RX ORDER — NITROFURANTOIN 25; 75 MG/1; MG/1
CAPSULE ORAL
COMMUNITY
Start: 2018-09-21 | End: 2018-10-12

## 2018-10-10 RX ORDER — FLUCONAZOLE 100 MG/1
TABLET ORAL
COMMUNITY
Start: 2018-08-29 | End: 2018-10-12

## 2018-10-12 ENCOUNTER — OFFICE VISIT (OUTPATIENT)
Dept: INTERNAL MEDICINE CLINIC | Facility: CLINIC | Age: 38
End: 2018-10-12
Payer: COMMERCIAL

## 2018-10-12 VITALS
OXYGEN SATURATION: 97 % | SYSTOLIC BLOOD PRESSURE: 110 MMHG | TEMPERATURE: 98.9 F | HEART RATE: 76 BPM | WEIGHT: 125.38 LBS | BODY MASS INDEX: 19 KG/M2 | DIASTOLIC BLOOD PRESSURE: 60 MMHG | HEIGHT: 68 IN

## 2018-10-12 DIAGNOSIS — R94.30 EJECTION FRACTION < 50%: ICD-10-CM

## 2018-10-12 DIAGNOSIS — N17.9 AKI (ACUTE KIDNEY INJURY) (HCC): ICD-10-CM

## 2018-10-12 DIAGNOSIS — E87.6 HYPOKALEMIA: Primary | ICD-10-CM

## 2018-10-12 DIAGNOSIS — N12 PYELONEPHRITIS: ICD-10-CM

## 2018-10-12 PROBLEM — K14.0 GLOSSITIS: Status: ACTIVE | Noted: 2018-10-12

## 2018-10-12 PROCEDURE — 99214 OFFICE O/P EST MOD 30 MIN: CPT | Performed by: NURSE PRACTITIONER

## 2018-10-12 NOTE — PROGRESS NOTES
Assessment/Plan:    No problem-specific Assessment & Plan notes found for this encounter  Diagnoses and all orders for this visit:    Hypokalemia  Comments:  K 3 0 pt is taking OTC potassium supplement and eating additional vitamin K foods  Orders:  -     Basic metabolic panel; Future    ANDRA (acute kidney injury) ([facility] [address] )  Comments:  GFR 54  Pt post pyelonephritis, will repeat BMP 1 mos  Orders:  -     Basic metabolic panel; Future    Pyelonephritis  Comments:  Resolved  Orders:  -     Basic metabolic panel; Future    Ejection fraction < 50%  Comments:  Echocardiagram demonstrates EF <50 referred to Cardiology  Orders:  -     Ambulatory referral to Cardiology; Future          Subjective:      Patient ID: Peter Clemons is a 30 y o  female  30 yr old female presents today for f/u review post hospitalization for pyelonephritis  Pt reports she "feels so much better" no fevers, some right flank tenderness, is urinating without difficulty and has returned to work full time  Echocardiagram reviewed, EF <50% which needs to be further evaluated by cardiology  I referred her to [facility]> Pt does note excess fatigue when she over exerts herself during exercise, requiring her to stop to rest  In addition although pt has normal BUN/Creat, GFR 54 may be reciprocal to ANRDA from pyelonephritis  K level 3 0 , pt reports she is eating additional K enriched foods, bananas, leafy greens and taking OTC potassium to improve her K level  I believe this is related to post pyelonephritis but will repeat her BMP in 1 mos  Pt will rtc following cardiology appt  The following portions of the patient's history were reviewed and updated as appropriate:   She  has a past medical history of Migraine; Ovarian cyst; Tachycardia; and Uterine leiomyoma    She   Patient Active Problem List    Diagnosis Date Noted    Hypokalemia 10/12/2018    Glossitis 10/12/2018    SOB (shortness of breath) 10/01/2018    Cough 10/01/2018    Malaise and fatigue 10/01/2018    Pyelonephritis 09/27/2018    History of migraine 09/27/2018    ANDRA (acute kidney injury) ([facility] [address] ) 09/27/2018    Cyst of right ovary 09/15/2017    Bronchospasm, exercise-induced 08/28/2017    Cervical dysplasia 08/28/2017    Chronic diarrhea 08/28/2017    Edema of left lower extremity 08/28/2017    Migraines 08/28/2017    Plantar fasciitis 06/08/2017    Pain of plantar aspect of heel 06/08/2017     She  has a past surgical history that includes Tonsillectomy; Appendectomy; Cervical biopsy w/ loop electrode excision; Colonoscopy; Dental surgery; Esophagogastroduodenoscopy; Dilation and curettage of uterus; and Ovarian cyst removal   Her family history includes Aortic aneurysm in her father; Cerebral aneurysm in her family; Hypertension in her father; Other in her mother  She  reports that she has never smoked  She has never used smokeless tobacco  She reports that she does not drink alcohol or use drugs  Current Outpatient Prescriptions   Medication Sig Dispense Refill    cholestyramine sugar free (QUESTRAN LIGHT) 4 g packet Take 4 g by mouth as needed      SUMAtriptan (IMITREX) 100 mg tablet Take 1 tablet by mouth      ondansetron (ZOFRAN) 4 mg tablet Take 1 tablet (4 mg total) by mouth every 8 (eight) hours as needed for nausea or vomiting (Patient not taking: Reported on 10/12/2018 ) 20 tablet 0     No current facility-administered medications for this visit        Current Outpatient Prescriptions on File Prior to Visit   Medication Sig    cholestyramine sugar free (QUESTRAN LIGHT) 4 g packet Take 4 g by mouth as needed    SUMAtriptan (IMITREX) 100 mg tablet Take 1 tablet by mouth    ondansetron (ZOFRAN) 4 mg tablet Take 1 tablet (4 mg total) by mouth every 8 (eight) hours as needed for nausea or vomiting (Patient not taking: Reported on 10/12/2018 )    [DISCONTINUED] famotidine (PEPCID) 20 mg tablet Take 1 tablet (20 mg total) by mouth 2 (two) times a day    [DISCONTINUED] fluconazole (DIFLUCAN) 100 mg tablet     [DISCONTINUED] nitrofurantoin (MACROBID) 100 mg capsule     [DISCONTINUED] nystatin (MYCOSTATIN) 100,000 units/mL suspension Apply 5 mL (500,000 Units total) to the mouth or throat 4 (four) times a day     No current facility-administered medications on file prior to visit  She is allergic to hydrocodone; morphine; morphine and related; oxycodone; penicillins; clomid [clomiphene]; and sulfa antibiotics       Review of Systems   Constitutional: Negative  Negative for fatigue  HENT: Negative  Negative for congestion, postnasal drip, rhinorrhea, sinus pain, sore throat and trouble swallowing  Eyes: Negative  Negative for pain and visual disturbance  Respiratory: Negative  Negative for cough, shortness of breath and wheezing  Cardiovascular: Negative for chest pain and palpitations  Exertional weakness and Fatigue    Gastrointestinal: Negative  Negative for constipation, diarrhea, nausea and vomiting  Endocrine: Negative  Negative for polydipsia, polyphagia and polyuria  Genitourinary: Positive for flank pain  Negative for difficulty urinating, frequency and pelvic pain  Musculoskeletal: Negative for arthralgias, back pain, joint swelling and myalgias  Skin: Negative  Negative for color change and rash  Allergic/Immunologic: Negative  Neurological: Negative  Negative for dizziness, syncope, weakness, light-headedness, numbness and headaches  Hematological: Negative  Negative for adenopathy  Does not bruise/bleed easily  Psychiatric/Behavioral: Negative  Negative for behavioral problems and sleep disturbance  The patient is not nervous/anxious  Objective:      /60   Pulse 76   Temp 98 9 °F (37 2 °C)   Ht 5' 8" (1 727 m)   Wt 56 9 kg (125 lb 6 oz)   LMP 09/15/2018   SpO2 97%   BMI 19 06 kg/m²          Physical Exam   Constitutional: She is oriented to person, place, and time   She appears well-developed and well-nourished  HENT:   Head: Normocephalic  Eyes: Pupils are equal, round, and reactive to light  Neck: Normal range of motion  Cardiovascular: Normal rate and regular rhythm  Pulmonary/Chest: Effort normal and breath sounds normal    Abdominal: Soft  Bowel sounds are normal  There is no tenderness  There is CVA tenderness  Musculoskeletal: Normal range of motion  Neurological: She is alert and oriented to person, place, and time  Skin: Skin is warm and dry  Psychiatric: She has a normal mood and affect  Her behavior is normal  Judgment and thought content normal    Nursing note and vitals reviewed

## 2018-10-12 NOTE — LETTER
2018     Patient: Ronold Apgar   YOB: 1980   Date of Visit: 10/12/2018       To Whom it May Concern:    Dona Agusto is under my professional care  She was seen in my office on 10/12/2018  She may return to work on October 15, 2018 with no restrictions  If you have any questions or concerns, please don't hesitate to call           Sincerely,          ERMA Shaw        CC: No Recipients

## 2018-10-18 ENCOUNTER — TELEPHONE (OUTPATIENT)
Dept: CARDIOLOGY CLINIC | Facility: CLINIC | Age: 38
End: 2018-10-18

## 2018-10-18 NOTE — TELEPHONE ENCOUNTER
----- Message from Denice Phoenix MD sent at 10/18/2018  4:45 PM EDT -----  Regarding: FW: Margaret Pickard was tasked but it is late in the day, not sure if she is going to get the task  Please let this patient know I a m  Willing to see her tomorrow at 10:40 a m  Here at the [facility] office  Alternatively she can see me any time between 2:00 p m  And 3:00 p m  If she prefers  ----- Message -----  From: Jesse Downey  Sent: 10/15/2018   3:20 PM  To: Denice Phoenix MD  Subject: Summer Doan,    Pt called to schedule a appointment with you  The referral is in epic by her pcp  She would like to see you within the next month  You are all booked up  Per pt December is too long  She heard great things and would like to stick with you  She is willing to see you whenever is convenient for you  She did say she would like to stick with [facility] if possible  You are at [facility] 10/30, 11/15, 11/27  Are you willing to overbook one of those days? Please Advise  Thanks,  Marina Preston

## 2018-10-19 ENCOUNTER — OFFICE VISIT (OUTPATIENT)
Dept: CARDIOLOGY CLINIC | Facility: CLINIC | Age: 38
End: 2018-10-19
Payer: COMMERCIAL

## 2018-10-19 VITALS
OXYGEN SATURATION: 96 % | DIASTOLIC BLOOD PRESSURE: 80 MMHG | HEART RATE: 84 BPM | HEIGHT: 68 IN | WEIGHT: 125.2 LBS | SYSTOLIC BLOOD PRESSURE: 128 MMHG | BODY MASS INDEX: 18.97 KG/M2

## 2018-10-19 DIAGNOSIS — R00.2 PALPITATIONS: ICD-10-CM

## 2018-10-19 DIAGNOSIS — R94.30 EJECTION FRACTION < 50%: Primary | ICD-10-CM

## 2018-10-19 PROCEDURE — 99243 OFF/OP CNSLTJ NEW/EST LOW 30: CPT | Performed by: INTERNAL MEDICINE

## 2018-10-19 PROCEDURE — 93000 ELECTROCARDIOGRAM COMPLETE: CPT | Performed by: INTERNAL MEDICINE

## 2018-10-19 NOTE — LETTER
October 22, 2018     Referral [address] [place] [id number]    Patient: Kelly Ramirez   YOB: 1980   Date of Visit: 10/19/2018       Dear Dr Aleksandr Delcid:    Thank you for referring Malgorzata Larsen to me for evaluation  Below are my notes for this consultation  If you have questions, please do not hesitate to call me  I look forward to following your patient along with you  Sincerely,        Petty Fritz MD        CC: ERMA Desai MD  10/22/2018  4:56 PM  Sign at close encounter  [facility]  Office Consultation  Kelly Ramirez 41 y o  female MRN: [id number]        Problems    1  Low normal LVEF POCT ECG   2  Palpitations  Holter monitor - 48 hour       Impression:    Dear Efraín Narvaez,    Thank you for sending Stella Michelle to see me at the to [place] office  Patient recently had pyelonephritis, inpatient admission, IV fluid administration, leg swelling in the setting of this which has slowly improved, although still admits to intermittent occasional isolated left lower extremity swelling when standing for extended duration during the day  Her cardiac exam is normal, I reviewed her echocardiogram is essentially normal, for young woman as her a low normal LVEF of 50-55% can be considered normal   She has no signs of heart failure  With regards to her palpitations, these sound like either PACs or PVCs, they have actually been longstanding and had them for many years and has no other associated symptoms  She has a very active individual, has no cardiac limiting symptoms with activity  I do not see the need for any further workup or cardiac follow-up at this time  She can follow up as needed  Reason for Consult / Principal Problem:  Palpitations, leg swelling, dizziness    HPI: Kelly Ramirez is a 41y o  year old female with a longstanding history of palpitations described as a skipping sensation, probably related to either PACs or PVCs    She was recently hospitalized for pyelonephritis  She has had some issues with leg edema, usually left lower extremity edema, isolated, not associated with any other heart failure manifestations, and often resolves with elevation of the leg  During a recent admission for pyelonephritis she received significant IV fluids due to acute kidney injury, she developed volume overload, some persistent lower extremity edema that at this point has fully resolved  LVEF was obtained by echocardiography, reportedly low normal LV function 50%, but this essentially can be considered normal in a 41year-old  She has no exercise intolerance, in fact enjoys exercise and makes it a part of her usual lifestyle  She denies syncope  Review of Systems   All other systems reviewed and are negative  Past Medical History:   Diagnosis Date    Migraine     Ovarian cyst     Tachycardia     Uterine leiomyoma     Last assessed 8/28/2017     Past Surgical History:   Procedure Laterality Date    APPENDECTOMY      CERVICAL BIOPSY  W/ LOOP ELECTRODE EXCISION      Last assessed 8/28/2017    COLONOSCOPY      DENTAL SURGERY      DILATION AND CURETTAGE OF UTERUS      Last assessed 8/28/2017    ESOPHAGOGASTRODUODENOSCOPY      OVARIAN CYST REMOVAL      Last assessed 8/28/2017    TONSILLECTOMY       History   Alcohol Use No     History   Drug Use No     History   Smoking Status    Never Smoker   Smokeless Tobacco    Never Used     Family History   Problem Relation Age of Onset    Other Mother         Cardiac Disorder    Heart attack Mother     Arrhythmia Mother     Coronary artery disease Mother     Hyperlipidemia Mother     Aortic aneurysm Father         Abdomincal aortic aneurysm    Hypertension Father     Cerebral aneurysm Family     Heart failure Paternal Grandmother     Stroke Neg Hx     Clotting disorder Neg Hx     Cancer Neg Hx        Allergies:   Allergies   Allergen Reactions    Hydrocodone Anaphylaxis    Morphine Palpitations    Morphine And Related Anaphylaxis and Palpitations    Oxycodone Anaphylaxis    Penicillins Fever and Other (See Comments)    Clomid [Clomiphene]     Sulfa Antibiotics Fever       Medications:     Current Outpatient Prescriptions:     cholestyramine sugar free (QUESTRAN LIGHT) 4 g packet, Take 4 g by mouth as needed, Disp: , Rfl:     SUMAtriptan (IMITREX) 100 mg tablet, Take 1 tablet by mouth once as needed  , Disp: , Rfl:       Vitals:    10/19/18 1026   BP: 128/80   Pulse:    SpO2:      Weight (last 2 days)     None        Physical Exam   Constitutional: No distress  HENT:   Head: Normocephalic and atraumatic  Eyes: Conjunctivae are normal  No scleral icterus  Neck: Normal range of motion  No JVD present  Cardiovascular: Normal rate, regular rhythm, normal heart sounds and intact distal pulses  No murmur heard  Pulmonary/Chest: Effort normal and breath sounds normal  No respiratory distress  She has no wheezes  She has no rales  Musculoskeletal: She exhibits no edema or tenderness  Skin: Skin is warm and dry  She is not diaphoretic           Laboratory Studies:  Lab Results   Component Value Date    HGBA1C 5 1 06/07/2018    HGBA1C 5 3 07/31/2017     10/02/2018     09/28/2018     09/27/2018    K 3 0 (L) 10/02/2018    K 3 6 09/28/2018    K 3 8 09/27/2018     10/02/2018     (H) 09/28/2018     09/27/2018    CO2 31 10/02/2018    CO2 22 09/28/2018    CO2 28 09/27/2018    CREATININE 1 28 10/02/2018    CREATININE 1 44 (H) 09/28/2018    CREATININE 1 72 (H) 09/27/2018    BUN 5 10/02/2018    BUN 10 09/28/2018    BUN 15 09/27/2018    MG 1 8 09/28/2018    MG 2 0 09/27/2018     Lab Results   Component Value Date    WBC 9 36 10/02/2018    RBC 4 22 10/02/2018    HGB 12 8 10/02/2018    HCT 38 1 10/02/2018    MCV 90 10/02/2018    MCH 30 3 10/02/2018    RDW 12 4 10/02/2018     10/02/2018     NT-proBNP: No results found for: NTBNP   CBC:  Lab Results   Component Value Date    WBC 9 36 10/02/2018    RBC 4 22 10/02/2018    HGB 12 8 10/02/2018    HCT 38 1 10/02/2018    MCV 90 10/02/2018    MCH 30 3 10/02/2018    RDW 12 4 10/02/2018     10/02/2018     Coags:    Lipid Profile:   No results found for: CHOL  Lab Results   Component Value Date    HDL 43 2018     Lab Results   Component Value Date    LDLCALC 89 2018     Lab Results   Component Value Date    TRIG 134 2018       Cardiac testing:     EKG reviewed personally:  Normal sinus rhythm, normal EKG    Results for orders placed during the hospital encounter of 10/09/18   Echo complete with contrast if indicated    Narrative [facility] [address]    Transthoracic Echocardiogram  2D, M-mode, Doppler, and Color Doppler    Study date:  09-Oct-2018    Patient: Raya Martins  MR number: [id number]  Account number: [de-identified]  : 1980  Age: 30 years  Gender: Female  Status: Outpatient  Location: [facility]   Height: 68 in  Weight: 133 lb  BP: 139/ 81 mmHg    Indications: Acute kidney injury  Edema of left lower extremity    Diagnoses: N17 9 - Acute kidney failure, unspecified, R60 0 - Localized edema    Sonographer:  Tha Hernandez  Referring Physician:  Janifer Felty, MD  Group:  [facility]  Interpreting Physician:  Norma Winter MD    SUMMARY    LEFT VENTRICLE:  Systolic function was at the lower limits of normal  Ejection fraction was estimated to be 50 %  There were no regional wall motion abnormalities  HISTORY: PRIOR HISTORY: Abnormal heart sound    PROCEDURE: The study was performed in the [facility]  This was a routine study  The transthoracic approach was used  The study included complete 2D imaging, M-mode, complete spectral Doppler, and color Doppler  The  heart rate was 80 bpm, at the start of the study   Images were obtained from the parasternal, apical, subcostal, and suprasternal notch acoustic windows  Echocardiographic views were limited due to low windows and lung interference  This  was a technically difficult study  LEFT VENTRICLE: Size was normal  Systolic function was at the lower limits of normal  Ejection fraction was estimated to be 50 %  There were no regional wall motion abnormalities  Wall thickness was normal  No evidence of apical thrombus  DOPPLER: Left ventricular diastolic function parameters were normal     RIGHT VENTRICLE: The size was normal  Systolic function was normal  Wall thickness was normal     LEFT ATRIUM: Size was normal     RIGHT ATRIUM: Size was normal     MITRAL VALVE: Valve structure was normal  There was normal leaflet separation  DOPPLER: The transmitral velocity was within the normal range  There was no evidence for stenosis  There was no significant regurgitation  AORTIC VALVE: The valve was trileaflet  Leaflets exhibited normal thickness and normal cuspal separation  DOPPLER: Transaortic velocity was within the normal range  There was no evidence for stenosis  There was no significant  regurgitation  TRICUSPID VALVE: The valve structure was normal  There was normal leaflet separation  DOPPLER: The transtricuspid velocity was within the normal range  There was no evidence for stenosis  There was no significant regurgitation  PULMONIC VALVE: Leaflets exhibited normal thickness, no calcification, and normal cuspal separation  DOPPLER: The transpulmonic velocity was within the normal range  There was no significant regurgitation  PERICARDIUM: There was no pericardial effusion  The pericardium was normal in appearance  AORTA: The root exhibited normal size  SYSTEMIC VEINS: IVC: The inferior vena cava was normal in size      SYSTEM MEASUREMENT TABLES    2D  %FS: 28 43 %  AV Diam: 2 68 cm  EDV(Teich): 84 04 ml  EF(Teich): 55 14 %  ESV(Teich): 37 7 ml  IVSd: 0 95 cm  LA Area: 8 32 cm2  LA Diam: 2 83 cm  LVEDV MOD A4C: 68 74 ml  LVEF MOD A4C: 55 43 %  LVESV MOD A4C: 30 64 ml  LVIDd: 4 32 cm  LVIDs: 3 09 cm  LVLd A4C: 6 58 cm  LVLs A4C: 5 64 cm  LVPWd: 0 93 cm  RA Area: 9 32 cm2  RV Diam : 2 41 cm  SV MOD A4C: 38 1 ml  SV(Cube): 51 11 ml  SV(Teich): 46 34 ml    CW  TR Vmax: 1 89 m/s  TR maxP 26 mmHg    MM  TAPSE: 1 97 cm    PW  E': 0 12 m/s  E/E': 4 87  MV A Jerry: 0 61 m/s  MV Dec Monona: 2 59 m/s2  MV DecT: 219 9 ms  MV E Jerry: 0 57 m/s  MV E/A Ratio: 0 93    [employer] Accredited Echocardiography [facility]    Prepared and electronically signed by    Cedrick Gonzalez MD  Signed 09-Oct-2018 16:33:58       No results found for this or any previous visit  No results found for this or any previous visit  No results found for this or any previous visit  Tammy Fine MD    Portions of the record may have been created with voice recognition software   Occasional wrong word or "sound a like" substitutions may have occurred due to the inherent limitations of voice recognition software   Read the chart carefully and recognize, using context, where substitutions have occurred

## 2018-10-22 NOTE — PROGRESS NOTES
[facility]  Office Consultation  Neville Velazquez 41 y o  female MRN: [id number]        Problems    1  Low normal LVEF POCT ECG   2  Palpitations  Holter monitor - 48 hour       Impression:    Dear Clara Plunkett,    Thank you for sending Arely Yun to see me at the [facility] office  Patient recently had pyelonephritis, inpatient admission, IV fluid administration, leg swelling in the setting of this which has slowly improved, although still admits to intermittent occasional isolated left lower extremity swelling when standing for extended duration during the day  Her cardiac exam is normal, I reviewed her echocardiogram is essentially normal, for young woman as her a low normal LVEF of 50-55% can be considered normal   She has no signs of heart failure  With regards to her palpitations, these sound like either PACs or PVCs, they have actually been longstanding and had them for many years and has no other associated symptoms  She has a very active individual, has no cardiac limiting symptoms with activity  I do not see the need for any further workup or cardiac follow-up at this time  She can follow up as needed  Reason for Consult / Principal Problem:  Palpitations, leg swelling, dizziness    HPI: Neville Velazquez is a 41y o  year old female with a longstanding history of palpitations described as a skipping sensation, probably related to either PACs or PVCs  She was recently hospitalized for pyelonephritis  She has had some issues with leg edema, usually left lower extremity edema, isolated, not associated with any other heart failure manifestations, and often resolves with elevation of the leg  During a recent admission for pyelonephritis she received significant IV fluids due to acute kidney injury, she developed volume overload, some persistent lower extremity edema that at this point has fully resolved    LVEF was obtained by echocardiography, reportedly low normal LV function 50%, but this essentially can be considered normal in a 41year-old  She has no exercise intolerance, in fact enjoys exercise and makes it a part of her usual lifestyle  She denies syncope  Review of Systems   All other systems reviewed and are negative  Past Medical History:   Diagnosis Date    Migraine     Ovarian cyst     Tachycardia     Uterine leiomyoma     Last assessed 8/28/2017     Past Surgical History:   Procedure Laterality Date    APPENDECTOMY      CERVICAL BIOPSY  W/ LOOP ELECTRODE EXCISION      Last assessed 8/28/2017    COLONOSCOPY      DENTAL SURGERY      DILATION AND CURETTAGE OF UTERUS      Last assessed 8/28/2017    ESOPHAGOGASTRODUODENOSCOPY      OVARIAN CYST REMOVAL      Last assessed 8/28/2017    TONSILLECTOMY       History   Alcohol Use No     History   Drug Use No     History   Smoking Status    Never Smoker   Smokeless Tobacco    Never Used     Family History   Problem Relation Age of Onset    Other Mother         Cardiac Disorder    Heart attack Mother     Arrhythmia Mother     Coronary artery disease Mother     Hyperlipidemia Mother     Aortic aneurysm Father         Abdomincal aortic aneurysm    Hypertension Father     Cerebral aneurysm Family     Heart failure Paternal Grandmother     Stroke Neg Hx     Clotting disorder Neg Hx     Cancer Neg Hx        Allergies:   Allergies   Allergen Reactions    Hydrocodone Anaphylaxis    Morphine Palpitations    Morphine And Related Anaphylaxis and Palpitations    Oxycodone Anaphylaxis    Penicillins Fever and Other (See Comments)    Clomid [Clomiphene]     Sulfa Antibiotics Fever       Medications:     Current Outpatient Prescriptions:     cholestyramine sugar free (QUESTRAN LIGHT) 4 g packet, Take 4 g by mouth as needed, Disp: , Rfl:     SUMAtriptan (IMITREX) 100 mg tablet, Take 1 tablet by mouth once as needed  , Disp: , Rfl:       Vitals:    10/19/18 1026   BP: 128/80   Pulse:    SpO2:      Weight (last 2 days) None        Physical Exam   Constitutional: No distress  HENT:   Head: Normocephalic and atraumatic  Eyes: Conjunctivae are normal  No scleral icterus  Neck: Normal range of motion  No JVD present  Cardiovascular: Normal rate, regular rhythm, normal heart sounds and intact distal pulses  No murmur heard  Pulmonary/Chest: Effort normal and breath sounds normal  No respiratory distress  She has no wheezes  She has no rales  Musculoskeletal: She exhibits no edema or tenderness  Skin: Skin is warm and dry  She is not diaphoretic           Laboratory Studies:  Lab Results   Component Value Date    HGBA1C 5 1 06/07/2018    HGBA1C 5 3 07/31/2017     10/02/2018     09/28/2018     09/27/2018    K 3 0 (L) 10/02/2018    K 3 6 09/28/2018    K 3 8 09/27/2018     10/02/2018     (H) 09/28/2018     09/27/2018    CO2 31 10/02/2018    CO2 22 09/28/2018    CO2 28 09/27/2018    CREATININE 1 28 10/02/2018    CREATININE 1 44 (H) 09/28/2018    CREATININE 1 72 (H) 09/27/2018    BUN 5 10/02/2018    BUN 10 09/28/2018    BUN 15 09/27/2018    MG 1 8 09/28/2018    MG 2 0 09/27/2018     Lab Results   Component Value Date    WBC 9 36 10/02/2018    RBC 4 22 10/02/2018    HGB 12 8 10/02/2018    HCT 38 1 10/02/2018    MCV 90 10/02/2018    MCH 30 3 10/02/2018    RDW 12 4 10/02/2018     10/02/2018     NT-proBNP: No results found for: NTBNP   CBC:  Lab Results   Component Value Date    WBC 9 36 10/02/2018    RBC 4 22 10/02/2018    HGB 12 8 10/02/2018    HCT 38 1 10/02/2018    MCV 90 10/02/2018    MCH 30 3 10/02/2018    RDW 12 4 10/02/2018     10/02/2018     Coags:    Lipid Profile:   No results found for: CHOL  Lab Results   Component Value Date    HDL 43 06/07/2018     Lab Results   Component Value Date    LDLCALC 89 06/07/2018     Lab Results   Component Value Date    TRIG 134 06/07/2018       Cardiac testing:     EKG reviewed personally:  Normal sinus rhythm, normal EKG    Results for orders placed during the hospital encounter of 10/09/18   Echo complete with contrast if indicated    Narrative [address]    Transthoracic Echocardiogram  2D, M-mode, Doppler, and Color Doppler    Study date:  09-Oct-2018    Patient: Gaurang Lombardo  MR number: [id number]  Account number: [de-identified]  : 1980  Age: 30 years  Gender: Female  Status: Outpatient  Location: [facility]   Height: 68 in  Weight: 133 lb  BP: 139/ 81 mmHg    Indications: Acute kidney injury  Edema of left lower extremity    Diagnoses: N17 9 - Acute kidney failure, unspecified, R60 0 - Localized edema    Sonographer:  Adriana Ornelas  Referring Physician:  Rocio Richmond MD  Group:  [facility]  Interpreting Physician:  Virginia Rincon MD    SUMMARY    LEFT VENTRICLE:  Systolic function was at the lower limits of normal  Ejection fraction was estimated to be 50 %  There were no regional wall motion abnormalities  HISTORY: PRIOR HISTORY: Abnormal heart sound    PROCEDURE: The study was performed in the [facility]  This was a routine study  The transthoracic approach was used  The study included complete 2D imaging, M-mode, complete spectral Doppler, and color Doppler  The  heart rate was 80 bpm, at the start of the study  Images were obtained from the parasternal, apical, subcostal, and suprasternal notch acoustic windows  Echocardiographic views were limited due to low windows and lung interference  This  was a technically difficult study  LEFT VENTRICLE: Size was normal  Systolic function was at the lower limits of normal  Ejection fraction was estimated to be 50 %  There were no regional wall motion abnormalities  Wall thickness was normal  No evidence of apical thrombus    DOPPLER: Left ventricular diastolic function parameters were normal     RIGHT VENTRICLE: The size was normal  Systolic function was normal  Wall thickness was normal     LEFT ATRIUM: Size was normal     RIGHT ATRIUM: Size was normal     MITRAL VALVE: Valve structure was normal  There was normal leaflet separation  DOPPLER: The transmitral velocity was within the normal range  There was no evidence for stenosis  There was no significant regurgitation  AORTIC VALVE: The valve was trileaflet  Leaflets exhibited normal thickness and normal cuspal separation  DOPPLER: Transaortic velocity was within the normal range  There was no evidence for stenosis  There was no significant  regurgitation  TRICUSPID VALVE: The valve structure was normal  There was normal leaflet separation  DOPPLER: The transtricuspid velocity was within the normal range  There was no evidence for stenosis  There was no significant regurgitation  PULMONIC VALVE: Leaflets exhibited normal thickness, no calcification, and normal cuspal separation  DOPPLER: The transpulmonic velocity was within the normal range  There was no significant regurgitation  PERICARDIUM: There was no pericardial effusion  The pericardium was normal in appearance  AORTA: The root exhibited normal size  SYSTEMIC VEINS: IVC: The inferior vena cava was normal in size      SYSTEM MEASUREMENT TABLES    2D  %FS: 28 43 %  AV Diam: 2 68 cm  EDV(Teich): 84 04 ml  EF(Teich): 55 14 %  ESV(Teich): 37 7 ml  IVSd: 0 95 cm  LA Area: 8 32 cm2  LA Diam: 2 83 cm  LVEDV MOD A4C: 68 74 ml  LVEF MOD A4C: 55 43 %  LVESV MOD A4C: 30 64 ml  LVIDd: 4 32 cm  LVIDs: 3 09 cm  LVLd A4C: 6 58 cm  LVLs A4C: 5 64 cm  LVPWd: 0 93 cm  RA Area: 9 32 cm2  RV Diam : 2 41 cm  SV MOD A4C: 38 1 ml  SV(Cube): 51 11 ml  SV(Teich): 46 34 ml    CW  TR Vmax: 1 89 m/s  TR maxP 26 mmHg    MM  TAPSE: 1 97 cm    PW  E': 0 12 m/s  E/E': 4 87  MV A Jerry: 0 61 m/s  MV Dec Berrien: 2 59 m/s2  MV DecT: 219 9 ms  MV E Jerry: 0 57 m/s  MV E/A Ratio: 0 93    [employer] Accredited [facility]    Prepared and electronically signed by    Shilpi Dahl MD  Signed 09-Oct-2018 16:33:58       No results found for this or any previous visit  No results found for this or any previous visit  No results found for this or any previous visit  Eboyn Mon MD    Portions of the record may have been created with voice recognition software   Occasional wrong word or "sound a like" substitutions may have occurred due to the inherent limitations of voice recognition software   Read the chart carefully and recognize, using context, where substitutions have occurred

## 2018-10-29 ENCOUNTER — HOSPITAL ENCOUNTER (OUTPATIENT)
Dept: NON INVASIVE DIAGNOSTICS | Facility: HOSPITAL | Age: 38
Discharge: HOME | End: 2018-10-29
Payer: COMMERCIAL

## 2018-10-29 ENCOUNTER — TELEPHONE (OUTPATIENT)
Dept: INTERNAL MEDICINE CLINIC | Facility: CLINIC | Age: 38
End: 2018-10-29

## 2018-10-29 DIAGNOSIS — R00.2 PALPITATIONS: ICD-10-CM

## 2018-10-29 DIAGNOSIS — N39.0 URINARY TRACT INFECTION WITHOUT HEMATURIA, SITE UNSPECIFIED: Primary | ICD-10-CM

## 2018-10-29 PROCEDURE — 93225 XTRNL ECG REC<48 HRS REC: CPT

## 2018-10-29 NOTE — TELEPHONE ENCOUNTER
Tell her to send her urine and we will call her with results but if she develops fever, urinary frequency let me know and i'll provide antibiotic

## 2018-10-29 NOTE — TELEPHONE ENCOUNTER
Pt called states that she has minimal back pain is concerned of Kidney failure since she has recently been hospitalized for this  Pt did POCT at work ([employer]) shows Large amt of Leukocytes  She would like you to order a UA C+S regardless of UA because that's how she ended up in the hospital UA POCT at that time was negative  Should she get Urine ASAP and should she come in for an appt ASAP? I will put C+S order in  Please advise

## 2018-10-30 ENCOUNTER — APPOINTMENT (OUTPATIENT)
Dept: LAB | Facility: CLINIC | Age: 38
End: 2018-10-30
Payer: COMMERCIAL

## 2018-10-30 DIAGNOSIS — E87.6 HYPOKALEMIA: ICD-10-CM

## 2018-10-30 DIAGNOSIS — N17.9 AKI (ACUTE KIDNEY INJURY) (HCC): ICD-10-CM

## 2018-10-30 DIAGNOSIS — N12 PYELONEPHRITIS: ICD-10-CM

## 2018-10-30 LAB
ANION GAP SERPL CALCULATED.3IONS-SCNC: 4 MMOL/L (ref 4–13)
BUN SERPL-MCNC: 10 MG/DL (ref 5–25)
CALCIUM SERPL-MCNC: 8.7 MG/DL (ref 8.3–10.1)
CHLORIDE SERPL-SCNC: 103 MMOL/L (ref 100–108)
CO2 SERPL-SCNC: 28 MMOL/L (ref 21–32)
CREAT SERPL-MCNC: 0.93 MG/DL (ref 0.6–1.3)
GFR SERPL CREATININE-BSD FRML MDRD: 79 ML/MIN/1.73SQ M
GLUCOSE SERPL-MCNC: 84 MG/DL (ref 65–140)
POTASSIUM SERPL-SCNC: 3.8 MMOL/L (ref 3.5–5.3)
SODIUM SERPL-SCNC: 135 MMOL/L (ref 136–145)

## 2018-10-30 PROCEDURE — 80048 BASIC METABOLIC PNL TOTAL CA: CPT

## 2018-10-30 PROCEDURE — 36415 COLL VENOUS BLD VENIPUNCTURE: CPT

## 2018-10-31 LAB — BACTERIA UR CULT: NORMAL

## 2018-11-05 PROCEDURE — 93227 XTRNL ECG REC<48 HR R&I: CPT | Performed by: INTERNAL MEDICINE

## 2018-12-20 ENCOUNTER — OFFICE VISIT (OUTPATIENT)
Dept: RHEUMATOLOGY | Facility: CLINIC | Age: 38
End: 2018-12-20
Payer: COMMERCIAL

## 2018-12-20 ENCOUNTER — TELEPHONE (OUTPATIENT)
Dept: OBGYN CLINIC | Facility: CLINIC | Age: 38
End: 2018-12-20

## 2018-12-20 VITALS
SYSTOLIC BLOOD PRESSURE: 112 MMHG | HEIGHT: 68 IN | HEART RATE: 67 BPM | BODY MASS INDEX: 19.94 KG/M2 | WEIGHT: 131.6 LBS | DIASTOLIC BLOOD PRESSURE: 75 MMHG

## 2018-12-20 DIAGNOSIS — R68.2 DRY MOUTH: ICD-10-CM

## 2018-12-20 DIAGNOSIS — H04.123 DRY EYES: Primary | ICD-10-CM

## 2018-12-20 PROCEDURE — 99204 OFFICE O/P NEW MOD 45 MIN: CPT | Performed by: INTERNAL MEDICINE

## 2018-12-20 RX ORDER — LIFITEGRAST 50 MG/ML
SOLUTION/ DROPS OPHTHALMIC
Refills: 0 | COMMUNITY
Start: 2018-12-05 | End: 2019-09-12

## 2018-12-20 RX ORDER — PREDNISONE 1 MG/1
15 TABLET ORAL DAILY
Qty: 21 TABLET | Refills: 0 | Status: SHIPPED | OUTPATIENT
Start: 2018-12-20 | End: 2018-12-27

## 2018-12-20 NOTE — TELEPHONE ENCOUNTER
Called patient to advise that her referral to general surgery had been put in and gave her the number to call to schedule

## 2018-12-20 NOTE — PROGRESS NOTES
Assessment and Plan:   Ms Hoff Her is a 82-year-old  female with no significant past medical history who presents for further evaluation of dry eyes and dry mouth     - Jose Hu presents today for further evaluation of severe sicca symptoms, which were acute in onset and have been gradually progressive  Other than these symptoms she has otherwise been asymptomatic  Her physical examination is unremarkable today  Preliminary testing showed a negative BERNARDINO, BERNARDINO specificity panel and complements  - The acuity and rapid onset of symptoms is unusual for an underlying autoimmune disorder, but as her symptoms have been progressive and very frustrating I offered a minor salivary gland biopsy to rule out Sjogren's syndrome as a cause for her symptoms  She is agreeable to this and I will place a referral to general surgery today for the biopsy to be done  - In the interim we can try a short course of prednisone 15 mg daily for 7 days to assess if this improves her symptoms  I advised her to let me know if this provides her with relief  She should also continue follow-up with her ophthalmologist to assess for improvement in the dry eyes  - I will obtain the labs as listed below to rule out other etiologies  If her symptoms persist I may consider obtaining imaging of her salivary glands  - Follow-up in the office after the biopsy is complete  Plan:  Diagnoses and all orders for this visit:    Dry eyes  -     Anti-scleroderma antibody; Future  -     CBC and differential; Future  -     Chronic Hepatitis Panel; Future  -     Comprehensive metabolic panel; Future  -     C-reactive protein; Future  -     IgG, IgA, IgM; Future  -     IgG 1, 2, 3, and 4; Future  -     Protein / creatinine ratio, urine  -     Sedimentation rate, automated; Future  -     Sjogren's Antibodies; Future  -     Centromere Antibody; Future  -     Urinalysis with microscopic  -     predniSONE 5 mg tablet;  Take 3 tablets (15 mg total) by mouth daily for 7 days  -     Angiotensin converting enzyme; Future    Dry mouth  -     Anti-scleroderma antibody; Future  -     CBC and differential; Future  -     Chronic Hepatitis Panel; Future  -     Comprehensive metabolic panel; Future  -     C-reactive protein; Future  -     IgG, IgA, IgM; Future  -     IgG 1, 2, 3, and 4; Future  -     Protein / creatinine ratio, urine  -     Sedimentation rate, automated; Future  -     Sjogren's Antibodies; Future  -     Centromere Antibody; Future  -     Urinalysis with microscopic  -     predniSONE 5 mg tablet; Take 3 tablets (15 mg total) by mouth daily for 7 days  -     Angiotensin converting enzyme; Future  -     Ambulatory referral to General Surgery; Future    Other orders  -     XIIDRA 5 % op solution; Activities as tolerated    Diet: low carb/low fat, more greens/vegetables, adequate hydration  Exercise: try to maintain a low impact exercise regimen as much as possible  Walk for 30 minutes a day for at least 3 days a week    Encouraged to maintain good sleep hygiene  Continue other medications as prescribed by PCP and other specialists        RTC after biopsy is done  HPI  Ms Hoff Her is a 81-year-old  female with no significant past medical history who presents for further evaluation of dry eyes and dry mouth  Patient states she has been experiencing intermittent dry mouth over the past 2 years, and recently has also noticed difficulty swallowing liquids  The dysphagia is not constant, and she does not have any pain with swallowing  She does not have any difficulty with swallowing solids  She has also been noticing a whitish discoloration over her tongue, with "scalloping"noted at the borders of her tongue  She was evaluated by her primary care physician and dentist who diagnosed it as geographic tongue, but patient feels this could be related to thrush and on occasion has been using Nystatin swish and swallow      Approximately 5 weeks ago she developed sudden onset of dry eyes affecting her bilaterally, which was also associated with pain and swelling  She initially thought this was related to conjunctivitis, but as her symptoms were not improving she was seen by Ophthalmology, who diagnosed her with severe bilateral dry eyes, and likely keratitis as a result of this  She was told that she is not producing any tears, but no clear diagnosis was made, and she was advised that this may improve with time and to continue managing her symptoms conservatively  She was prescribed artificial tears and topical prednisolone eyedrops which she has completed  She states the topical steroids did help with slight improvement in her vision  She has still been experiencing severe dry eyes, which is associated with a gritty sensation, blurred vision, and only in the mornings will she experience red eyes  She is very frustrated with the ongoing symptoms  In the last week or so she has also noticed a stinging sensation in her bilateral knees, which occurs intermittently, and resolves spontaneously  On occasion she has noted swelling in her right knee  She also describes symptoms consistent with Raynaud's  She otherwise denies any other joint pains, back pain, swelling or morning stiffness  She denies fevers, chills, night sweats, unintentional weight loss, alopecia, skin rash, photosensitivity, mouth/nose ulcers, persistently swollen glands, pleuritic chest pain, shortness of breath, abdominal pain, vomiting, diarrhea, blood in stools, blood clots, miscarriages, or family history of autoimmune disease  In view of the above-mentioned complaints, she recently had testing done for BERNARDINO, BERNARDINO specificity including SSA and SSB antibodies, complements and rheumatoid factor which were all negative  The ESR was found to be mildly elevated at 29      She also reports a recent diagnosis of pyelonephritis with acute kidney injury in September 2018, at which time she was hospitalized and received IV antibiotics  She has never previously been diagnosed with kidney disease, and states her renal functions are back at baseline  No acute complaints otherwise  The following portions of the patient's history were reviewed and updated as appropriate: allergies, current medications, past family history, past medical history, past social history, past surgical history and problem list       Review of Systems  Constitutional: Negative for weight change, fevers, chills, night sweats, fatigue  ENT/Mouth: Negative for hearing changes, ear pain, nasal congestion, sinus pain, hoarseness, sore throat, rhinorrhea  Positive for swallowing difficulty  Eyes: Positive for pain, redness, discharge, vision changes  Cardiovascular: Negative for chest pain, SOB, palpitations  Respiratory: Negative for cough, sputum, wheezing, dyspnea  Gastrointestinal: Negative for nausea, vomiting, diarrhea, constipation, pain, heartburn  Genitourinary: Negative for dysuria, urinary frequency, hematuria  Musculoskeletal: As per HPI  Skin: Negative for skin rash, color changes  Neuro: Negative for weakness, numbness, tingling, loss of consciousness  Positive for headache and dizziness  Psych: Negative for anxiety, depression  Heme/Lymph: Negative for easy bruising, bleeding, lymphadenopathy          Past Medical History:   Diagnosis Date    Migraine     Ovarian cyst     Palpitations     Tachycardia     Uterine leiomyoma     Last assessed 8/28/2017       Past Surgical History:   Procedure Laterality Date    APPENDECTOMY      CERVICAL BIOPSY  W/ LOOP ELECTRODE EXCISION      Last assessed 8/28/2017    COLONOSCOPY      DENTAL SURGERY      DILATION AND CURETTAGE OF UTERUS      Last assessed 8/28/2017    ESOPHAGOGASTRODUODENOSCOPY      OVARIAN CYST REMOVAL      Last assessed 8/28/2017    TONSILLECTOMY         Social History     Social History    Marital status: Single     Spouse name: N/A    Number of children: N/A    Years of education: N/A     Occupational History     [employer]     Social History Main Topics    Smoking status: Never Smoker    Smokeless tobacco: Never Used    Alcohol use No    Drug use: No    Sexual activity: Yes     Other Topics Concern    Not on file     Social History Narrative    No narrative on file       Family History   Problem Relation Age of Onset    Other Mother         Cardiac Disorder    Heart attack Mother     Arrhythmia Mother     Coronary artery disease Mother     Hyperlipidemia Mother     Aortic aneurysm Father         Abdomincal aortic aneurysm    Hypertension Father     Cerebral aneurysm Family     Heart failure Paternal Grandmother     Stroke Neg Hx     Clotting disorder Neg Hx     Cancer Neg Hx        Allergies   Allergen Reactions    Hydrocodone Anaphylaxis    Morphine Palpitations    Morphine And Related Anaphylaxis and Palpitations    Oxycodone Anaphylaxis    Penicillins Fever and Other (See Comments)    Clomid [Clomiphene]     Sulfa Antibiotics Fever       Current Outpatient Prescriptions:     cholestyramine sugar free (QUESTRAN LIGHT) 4 g packet, Take 4 g by mouth as needed, Disp: , Rfl:     XIIDRA 5 % op solution, , Disp: , Rfl: 0    predniSONE 5 mg tablet, Take 3 tablets (15 mg total) by mouth daily for 7 days, Disp: 21 tablet, Rfl: 0    SUMAtriptan (IMITREX) 100 mg tablet, Take 1 tablet by mouth once as needed  , Disp: , Rfl:       Objective:    Vitals:    12/20/18 0808   BP: 112/75   Pulse: 67   Weight: 59 7 kg (131 lb 9 6 oz)   Height: 5' 8" (1 727 m)       Physical Exam  General: Well appearing, well nourished, in no distress  Oriented x 3, normal mood and affect  Ambulating without difficulty  Skin: Good turgor, no rash, unusual bruising or prominent lesions  Hair: Normal texture and distribution  Nails: Normal color, no deformities  HEENT:  Head: Normocephalic, atraumatic    Eyes: Conjunctiva clear, sclera non-icteric, EOM intact  Nose: No external lesions, mucosa non-inflamed  Mouth: Mucous membranes moist, no mucosal lesions  Neck: Supple, thyroid non-enlarged and non-tender  No lymphadenopathy  Heart: Regular rate and rhythm, no murmur or gallop  Lungs: Clear to auscultation, no crackles or wheezing  Abdomen: Soft, non-tender, non-distended, bowel sounds normal    Extremities: No amputations or deformities, cyanosis, edema  Musculoskeletal:  I do not appreciate any joint soft tissue swelling or tenderness  She has full range of motion in all joints  Neurologic: Alert and oriented  No focal neurological deficits appreciated  Psychiatric: Normal mood and affect  Herbert Dancer, M D    Rheumatology

## 2018-12-21 ENCOUNTER — APPOINTMENT (OUTPATIENT)
Dept: LAB | Facility: CLINIC | Age: 38
End: 2018-12-21
Payer: COMMERCIAL

## 2018-12-21 DIAGNOSIS — R68.2 DRY MOUTH: ICD-10-CM

## 2018-12-21 DIAGNOSIS — H04.123 DRY EYES: ICD-10-CM

## 2018-12-21 LAB
ALBUMIN SERPL BCP-MCNC: 4 G/DL (ref 3.5–5)
ALP SERPL-CCNC: 64 U/L (ref 46–116)
ALT SERPL W P-5'-P-CCNC: 22 U/L (ref 12–78)
ANION GAP SERPL CALCULATED.3IONS-SCNC: 6 MMOL/L (ref 4–13)
AST SERPL W P-5'-P-CCNC: 15 U/L (ref 5–45)
BACTERIA UR QL AUTO: NORMAL /HPF
BASOPHILS # BLD AUTO: 0.04 THOUSANDS/ΜL (ref 0–0.1)
BASOPHILS NFR BLD AUTO: 0 % (ref 0–1)
BILIRUB SERPL-MCNC: 0.59 MG/DL (ref 0.2–1)
BILIRUB UR QL STRIP: NEGATIVE
BUN SERPL-MCNC: 7 MG/DL (ref 5–25)
CALCIUM SERPL-MCNC: 8.7 MG/DL (ref 8.3–10.1)
CHLORIDE SERPL-SCNC: 103 MMOL/L (ref 100–108)
CLARITY UR: CLEAR
CO2 SERPL-SCNC: 27 MMOL/L (ref 21–32)
COLOR UR: YELLOW
CREAT SERPL-MCNC: 0.84 MG/DL (ref 0.6–1.3)
CREAT UR-MCNC: 30 MG/DL
CRP SERPL QL: <3 MG/L
EOSINOPHIL # BLD AUTO: 0.07 THOUSAND/ΜL (ref 0–0.61)
EOSINOPHIL NFR BLD AUTO: 1 % (ref 0–6)
ERYTHROCYTE [DISTWIDTH] IN BLOOD BY AUTOMATED COUNT: 12.8 % (ref 11.6–15.1)
ERYTHROCYTE [SEDIMENTATION RATE] IN BLOOD: 7 MM/HOUR (ref 0–20)
GFR SERPL CREATININE-BSD FRML MDRD: 90 ML/MIN/1.73SQ M
GLUCOSE SERPL-MCNC: 70 MG/DL (ref 65–140)
GLUCOSE UR STRIP-MCNC: NEGATIVE MG/DL
HCT VFR BLD AUTO: 39.9 % (ref 34.8–46.1)
HGB BLD-MCNC: 13.1 G/DL (ref 11.5–15.4)
HGB UR QL STRIP.AUTO: NEGATIVE
IGA SERPL-MCNC: 240 MG/DL (ref 70–400)
IGG SERPL-MCNC: 864 MG/DL (ref 700–1600)
IGM SERPL-MCNC: 184 MG/DL (ref 40–230)
IMM GRANULOCYTES # BLD AUTO: 0.02 THOUSAND/UL (ref 0–0.2)
IMM GRANULOCYTES NFR BLD AUTO: 0 % (ref 0–2)
KETONES UR STRIP-MCNC: NEGATIVE MG/DL
LEUKOCYTE ESTERASE UR QL STRIP: NEGATIVE
LYMPHOCYTES # BLD AUTO: 4.88 THOUSANDS/ΜL (ref 0.6–4.47)
LYMPHOCYTES NFR BLD AUTO: 41 % (ref 14–44)
MCH RBC QN AUTO: 30.6 PG (ref 26.8–34.3)
MCHC RBC AUTO-ENTMCNC: 32.8 G/DL (ref 31.4–37.4)
MCV RBC AUTO: 93 FL (ref 82–98)
MONOCYTES # BLD AUTO: 1.02 THOUSAND/ΜL (ref 0.17–1.22)
MONOCYTES NFR BLD AUTO: 9 % (ref 4–12)
NEUTROPHILS # BLD AUTO: 5.96 THOUSANDS/ΜL (ref 1.85–7.62)
NEUTS SEG NFR BLD AUTO: 49 % (ref 43–75)
NITRITE UR QL STRIP: NEGATIVE
NON-SQ EPI CELLS URNS QL MICRO: NORMAL /HPF
NRBC BLD AUTO-RTO: 0 /100 WBCS
PH UR STRIP.AUTO: 6 [PH] (ref 4.5–8)
PLATELET # BLD AUTO: 219 THOUSANDS/UL (ref 149–390)
PMV BLD AUTO: 10 FL (ref 8.9–12.7)
POTASSIUM SERPL-SCNC: 3.4 MMOL/L (ref 3.5–5.3)
PROT SERPL-MCNC: 7.3 G/DL (ref 6.4–8.2)
PROT UR STRIP-MCNC: NEGATIVE MG/DL
PROT UR-MCNC: <6 MG/DL
PROT/CREAT UR: <0.2 MG/G{CREAT} (ref 0–0.1)
RBC # BLD AUTO: 4.28 MILLION/UL (ref 3.81–5.12)
RBC #/AREA URNS AUTO: NORMAL /HPF
SODIUM SERPL-SCNC: 136 MMOL/L (ref 136–145)
SP GR UR STRIP.AUTO: 1.01 (ref 1–1.03)
UROBILINOGEN UR QL STRIP.AUTO: 0.2 E.U./DL
WBC # BLD AUTO: 11.99 THOUSAND/UL (ref 4.31–10.16)
WBC #/AREA URNS AUTO: NORMAL /HPF

## 2018-12-21 PROCEDURE — 82164 ANGIOTENSIN I ENZYME TEST: CPT

## 2018-12-21 PROCEDURE — 86235 NUCLEAR ANTIGEN ANTIBODY: CPT

## 2018-12-21 PROCEDURE — 82570 ASSAY OF URINE CREATININE: CPT | Performed by: INTERNAL MEDICINE

## 2018-12-21 PROCEDURE — 86704 HEP B CORE ANTIBODY TOTAL: CPT

## 2018-12-21 PROCEDURE — 86705 HEP B CORE ANTIBODY IGM: CPT

## 2018-12-21 PROCEDURE — 86803 HEPATITIS C AB TEST: CPT

## 2018-12-21 PROCEDURE — 82784 ASSAY IGA/IGD/IGG/IGM EACH: CPT

## 2018-12-21 PROCEDURE — 85652 RBC SED RATE AUTOMATED: CPT

## 2018-12-21 PROCEDURE — 86140 C-REACTIVE PROTEIN: CPT

## 2018-12-21 PROCEDURE — 80053 COMPREHEN METABOLIC PANEL: CPT

## 2018-12-21 PROCEDURE — 87340 HEPATITIS B SURFACE AG IA: CPT

## 2018-12-21 PROCEDURE — 81001 URINALYSIS AUTO W/SCOPE: CPT | Performed by: INTERNAL MEDICINE

## 2018-12-21 PROCEDURE — 82787 IGG 1 2 3 OR 4 EACH: CPT

## 2018-12-21 PROCEDURE — 84156 ASSAY OF PROTEIN URINE: CPT | Performed by: INTERNAL MEDICINE

## 2018-12-21 PROCEDURE — 85025 COMPLETE CBC W/AUTO DIFF WBC: CPT

## 2018-12-21 PROCEDURE — 36415 COLL VENOUS BLD VENIPUNCTURE: CPT

## 2018-12-22 LAB
ENA SCL70 AB SER-ACNC: <0.2 AI (ref 0–0.9)
ENA SS-A AB SER-ACNC: <0.2 AI (ref 0–0.9)
ENA SS-B AB SER-ACNC: <0.2 AI (ref 0–0.9)
HBV CORE AB SER QL: NORMAL
HBV CORE IGM SER QL: NORMAL
HBV SURFACE AG SER QL: NORMAL
HCV AB SER QL: NORMAL

## 2018-12-24 ENCOUNTER — APPOINTMENT (OUTPATIENT)
Dept: LAB | Facility: CLINIC | Age: 38
End: 2018-12-24
Payer: COMMERCIAL

## 2018-12-24 ENCOUNTER — OFFICE VISIT (OUTPATIENT)
Dept: FAMILY MEDICINE CLINIC | Facility: CLINIC | Age: 38
End: 2018-12-24
Payer: COMMERCIAL

## 2018-12-24 VITALS
HEIGHT: 68 IN | TEMPERATURE: 98.9 F | BODY MASS INDEX: 19.91 KG/M2 | SYSTOLIC BLOOD PRESSURE: 112 MMHG | WEIGHT: 131.4 LBS | RESPIRATION RATE: 16 BRPM | HEART RATE: 70 BPM | DIASTOLIC BLOOD PRESSURE: 74 MMHG | OXYGEN SATURATION: 98 %

## 2018-12-24 DIAGNOSIS — R53.83 FATIGUE, UNSPECIFIED TYPE: ICD-10-CM

## 2018-12-24 DIAGNOSIS — R53.83 FATIGUE, UNSPECIFIED TYPE: Primary | ICD-10-CM

## 2018-12-24 DIAGNOSIS — R10.9 BILATERAL FLANK PAIN: ICD-10-CM

## 2018-12-24 DIAGNOSIS — H53.9 VISION CHANGES: ICD-10-CM

## 2018-12-24 LAB — ACE SERPL-CCNC: 35 U/L (ref 14–82)

## 2018-12-24 PROCEDURE — 99214 OFFICE O/P EST MOD 30 MIN: CPT | Performed by: NURSE PRACTITIONER

## 2018-12-24 PROCEDURE — 1036F TOBACCO NON-USER: CPT | Performed by: NURSE PRACTITIONER

## 2018-12-24 PROCEDURE — 36415 COLL VENOUS BLD VENIPUNCTURE: CPT

## 2018-12-24 PROCEDURE — 82785 ASSAY OF IGE: CPT

## 2018-12-24 PROCEDURE — 86665 EPSTEIN-BARR CAPSID VCA: CPT

## 2018-12-24 PROCEDURE — 86664 EPSTEIN-BARR NUCLEAR ANTIGEN: CPT

## 2018-12-24 PROCEDURE — 86663 EPSTEIN-BARR ANTIBODY: CPT

## 2018-12-24 PROCEDURE — 86235 NUCLEAR ANTIGEN ANTIBODY: CPT

## 2018-12-24 PROCEDURE — 86003 ALLG SPEC IGE CRUDE XTRC EA: CPT

## 2018-12-24 NOTE — PATIENT INSTRUCTIONS
Get labs done with additional labs ordered by Rheumatology  Continue Prednisone as ordered by Rheumatology  Call or return for problems/concerns

## 2018-12-24 NOTE — PROGRESS NOTES
[facility]        NAME: Anton Ramires is a 41 y o  female  : 1980    MRN: [id number]  DATE: 2018  TIME: 9:33 AM    Assessment and Plan   Fatigue, unspecified type [R53 83]  1  Fatigue, unspecified type  EBV acute panel    Food Allergy Profile    [place] Allergy Panel, Adult   2  Vision changes     3  Bilateral flank pain           Patient Instructions     Patient Instructions   Get labs done with additional labs ordered by Rheumatology  Continue Prednisone as ordered by Rheumatology  Call or return for problems/concerns          Chief Complaint     Chief Complaint   Patient presents with    Establish Care         History of Present Illness       Pt  Here as a new patient  Has recently had numerous multiple system symptoms  She has blood work done- so far no diagnosis  Has seen Cardiology, Rheumatology, and Opthalmology  She continues to have vision issues, b/l back pain, b/l knee pain, fatigue, trouble swallowing, 10 lb weight gain, and dizziness  She would like to know if she has allergies causing these symptoms        Review of Systems   Review of Systems   Constitutional: Positive for activity change, fatigue and fever (now resolved)  Negative for diaphoresis  HENT: Negative for congestion, facial swelling, hearing loss, rhinorrhea, sinus pain, sinus pressure, sneezing, sore throat and voice change  Eyes: Positive for pain (dryness) and visual disturbance  Negative for discharge  Respiratory: Negative for cough, choking, chest tightness, shortness of breath, wheezing and stridor  Cardiovascular: Positive for palpitations  Negative for chest pain and leg swelling  Gastrointestinal: Negative for abdominal distention, abdominal pain, constipation, diarrhea, nausea and vomiting  Endocrine: Negative for polydipsia, polyphagia and polyuria  Genitourinary: Positive for flank pain  Negative for difficulty urinating, dysuria, frequency and urgency  Musculoskeletal: Positive for back pain  Negative for arthralgias, gait problem, joint swelling, myalgias, neck pain and neck stiffness  Skin: Negative for color change, rash and wound  Neurological: Positive for dizziness and light-headedness  Negative for syncope, speech difficulty, weakness and headaches  Hematological: Negative for adenopathy  Does not bruise/bleed easily  Psychiatric/Behavioral: Negative for agitation, behavioral problems, confusion, hallucinations, sleep disturbance and suicidal ideas  The patient is not nervous/anxious            Current Medications       Current Outpatient Prescriptions:     predniSONE 5 mg tablet, Take 3 tablets (15 mg total) by mouth daily for 7 days, Disp: 21 tablet, Rfl: 0    SUMAtriptan (IMITREX) 100 mg tablet, Take 1 tablet by mouth once as needed  , Disp: , Rfl:     XIIDRA 5 % op solution, , Disp: , Rfl: 0    Current Allergies     Allergies as of 12/24/2018 - Reviewed 12/24/2018   Allergen Reaction Noted    Hydrocodone Anaphylaxis 09/27/2018    Morphine Palpitations     Morphine and related Anaphylaxis and Palpitations 02/12/2014    Oxycodone Anaphylaxis 09/27/2018    Penicillins Fever and Other (See Comments) 02/12/2014    Clomid [clomiphene]  09/25/2018    Sulfa antibiotics Fever 08/28/2017            The following portions of the patient's history were reviewed and updated as appropriate: allergies, current medications, past family history, past medical history, past social history, past surgical history and problem list      Past Medical History:   Diagnosis Date    Migraine     Ovarian cyst     Palpitations     Tachycardia     Uterine leiomyoma     Last assessed 8/28/2017       Past Surgical History:   Procedure Laterality Date    APPENDECTOMY      CERVICAL BIOPSY  W/ LOOP ELECTRODE EXCISION      Last assessed 8/28/2017    COLONOSCOPY      DENTAL SURGERY      DILATION AND CURETTAGE OF UTERUS      Last assessed 8/28/2017    ESOPHAGOGASTRODUODENOSCOPY      OVARIAN CYST REMOVAL      Last assessed 8/28/2017    TONSILLECTOMY         Family History   Problem Relation Age of Onset    Other Mother         Cardiac Disorder    Heart attack Mother     Arrhythmia Mother     Coronary artery disease Mother     Hyperlipidemia Mother     Aortic aneurysm Father         Abdomincal aortic aneurysm    Hypertension Father     Cerebral aneurysm Family     Heart failure Paternal Grandmother     Stroke Neg Hx     Clotting disorder Neg Hx     Cancer Neg Hx          Medications have been verified  Objective   /74 (BP Location: Left arm, Patient Position: Sitting, Cuff Size: Adult)   Pulse 70   Temp 98 9 °F (37 2 °C) (Tympanic)   Resp 16   Ht 5' 8" (1 727 m)   Wt 59 6 kg (131 lb 6 4 oz)   SpO2 98%   BMI 19 98 kg/m²        Physical Exam     Physical Exam   Constitutional: She is oriented to person, place, and time  Vital signs are normal  She appears well-developed and well-nourished  She is active and cooperative  No distress  Eyes: EOM are normal    Cardiovascular: Normal rate, regular rhythm and normal heart sounds  No murmur heard  Pulmonary/Chest: Effort normal and breath sounds normal  No respiratory distress  She has no wheezes  Neurological: She is alert and oriented to person, place, and time  Skin: Skin is warm and dry  No rash noted  She is not diaphoretic  No erythema  Psychiatric: She has a normal mood and affect  Her behavior is normal  Judgment and thought content normal    Nursing note and vitals reviewed

## 2018-12-25 LAB
IGG SERPL-MCNC: 762 MG/DL (ref 700–1600)
IGG1 SER-MCNC: 296 MG/DL (ref 248–810)
IGG2 SER-MCNC: 355 MG/DL (ref 130–555)
IGG3 SER-MCNC: 39 MG/DL (ref 15–102)
IGG4 SER-MCNC: 26 MG/DL (ref 2–96)

## 2018-12-26 LAB
A ALTERNATA IGE QN: <0.1 KUA/I
A FUMIGATUS IGE QN: <0.1 KUA/I
ALLERGEN COMMENT: NORMAL
ALLERGEN COMMENT: NORMAL
ALMOND IGE QN: <0.1 KUA/I
BERMUDA GRASS IGE QN: <0.1 KUA/I
BOXELDER IGE QN: <0.1 KUA/I
C HERBARUM IGE QN: <0.1 KUA/I
CASHEW NUT IGE QN: <0.1 KUA/I
CAT DANDER IGE QN: <0.1 KUA/I
CENTROMERE B AB SER-ACNC: <0.2 AI (ref 0–0.9)
CMN PIGWEED IGE QN: <0.1 KUA/I
CODFISH IGE QN: <0.1 KUA/I
COMMON RAGWEED IGE QN: <0.1 KUA/I
COTTONWOOD IGE QN: <0.1 KUA/I
D FARINAE IGE QN: <0.1 KUA/I
D PTERONYSS IGE QN: <0.1 KUA/I
DOG DANDER IGE QN: <0.1 KUA/I
EBV EA IGG SER-ACNC: <9 U/ML (ref 0–8.9)
EBV NA IGG SER IA-ACNC: 307 U/ML (ref 0–17.9)
EBV PATRN SPEC IB-IMP: ABNORMAL
EBV VCA IGG SER IA-ACNC: >600 U/ML (ref 0–17.9)
EBV VCA IGM SER IA-ACNC: <36 U/ML (ref 0–35.9)
EGG WHITE IGE QN: <0.1 KUA/I
GLUTEN IGE QN: <0.1 KUA/I
HAZELNUT IGE QN: <0.1 KUA/L
LONDON PLANE IGE QN: <0.1 KUA/I
MILK IGE QN: <0.1 KUA/I
MOUSE URINE PROT IGE QN: <0.1 KUA/I
MT JUNIPER IGE QN: <0.1 KUA/I
MUGWORT IGE QN: <0.1 KUA/I
P NOTATUM IGE QN: <0.1 KUA/I
PEANUT IGE QN: <0.1 KUA/I
ROACH IGE QN: <0.1 KUA/I
SALMON IGE QN: <0.1 KUA/I
SCALLOP IGE QN: <0.1 KUA/L
SESAME SEED IGE QN: <0.1 KUA/I
SHEEP SORREL IGE QN: <0.1 KUA/I
SHRIMP IGE QN: <0.1 KUA/L
SILVER BIRCH IGE QN: <0.1 KUA/I
SOYBEAN IGE QN: <0.1 KUA/I
TIMOTHY IGE QN: <0.1 KUA/I
TOTAL IGE SMQN RAST: 3.14 KU/L (ref 0–113)
TOTAL IGE SMQN RAST: 3.47 KU/L (ref 0–113)
TUNA IGE QN: <0.1 KUA/I
WALNUT IGE QN: <0.1 KUA/I
WALNUT IGE QN: <0.1 KUA/I
WHEAT IGE QN: <0.1 KUA/I
WHITE ASH IGE QN: <0.1 KUA/I
WHITE ELM IGE QN: <0.1 KUA/I
WHITE MULBERRY IGE QN: <0.1 KUA/I
WHITE OAK IGE QN: <0.1 KUA/I

## 2018-12-27 ENCOUNTER — TELEPHONE (OUTPATIENT)
Dept: OBGYN CLINIC | Facility: CLINIC | Age: 38
End: 2018-12-27

## 2018-12-27 ENCOUNTER — TELEPHONE (OUTPATIENT)
Dept: FAMILY MEDICINE CLINIC | Facility: CLINIC | Age: 38
End: 2018-12-27

## 2018-12-27 NOTE — TELEPHONE ENCOUNTER
----- Message from Mary Martines MD sent at 12/26/2018  3:55 PM EST -----  Please let patient know her labs were unremarkable, and we will wait to see what the lip biopsy shows  Thank you

## 2018-12-27 NOTE — TELEPHONE ENCOUNTER
Patient called back and I was able to let her know that her labs came back normal and after her lip biopsy done we be in touch with her  Patient then explain that she recently changed her PCP and did blood work that was ordered  Which she was positive for Mono and was advised to hold off the biopsy to see if once the Mono is cleared that her symptoms she's experiencing is depleted  And she also mentioned that she finished her steroids that she was on for the week and didn't want to continue being on them  She said her vision is back 100% but his her eyes are still dry  I confirm with Dr Oscar Clemons that was fine and that she currently doesn't need to be on the steroids  If the Symptoms she's having continues after the Mono is cured than it is advised that she goes for the lip biopsy  Til then we will continue care on as needed bases  Patient was within understanding

## 2019-01-13 PROBLEM — K14.0 GLOSSITIS: Status: RESOLVED | Noted: 2018-10-12 | Resolved: 2019-01-13

## 2019-01-13 PROBLEM — R05.9 COUGH: Status: RESOLVED | Noted: 2018-10-01 | Resolved: 2019-01-13

## 2019-02-01 ENCOUNTER — OFFICE VISIT (OUTPATIENT)
Dept: FAMILY MEDICINE CLINIC | Facility: CLINIC | Age: 39
End: 2019-02-01
Payer: COMMERCIAL

## 2019-02-01 VITALS
OXYGEN SATURATION: 97 % | TEMPERATURE: 98.8 F | RESPIRATION RATE: 16 BRPM | SYSTOLIC BLOOD PRESSURE: 110 MMHG | BODY MASS INDEX: 20.61 KG/M2 | HEART RATE: 68 BPM | DIASTOLIC BLOOD PRESSURE: 76 MMHG | WEIGHT: 136 LBS | HEIGHT: 68 IN

## 2019-02-01 DIAGNOSIS — J01.10 ACUTE FRONTAL SINUSITIS, RECURRENCE NOT SPECIFIED: Primary | ICD-10-CM

## 2019-02-01 DIAGNOSIS — K31.89 INCREASED STOMACH ACID: ICD-10-CM

## 2019-02-01 PROCEDURE — 3008F BODY MASS INDEX DOCD: CPT | Performed by: NURSE PRACTITIONER

## 2019-02-01 PROCEDURE — 1036F TOBACCO NON-USER: CPT | Performed by: NURSE PRACTITIONER

## 2019-02-01 PROCEDURE — 99214 OFFICE O/P EST MOD 30 MIN: CPT | Performed by: NURSE PRACTITIONER

## 2019-02-01 RX ORDER — CHOLESTYRAMINE 4 G/9G
1 POWDER, FOR SUSPENSION ORAL DAILY
Qty: 60 PACKET | Refills: 1 | Status: SHIPPED | OUTPATIENT
Start: 2019-02-01 | End: 2019-11-21 | Stop reason: SDUPTHER

## 2019-02-01 RX ORDER — CHOLESTYRAMINE 4 G/9G
1 POWDER, FOR SUSPENSION ORAL WEEKLY
COMMUNITY
End: 2019-02-01 | Stop reason: SDUPTHER

## 2019-02-01 RX ORDER — PREDNISONE 20 MG/1
20 TABLET ORAL 2 TIMES DAILY WITH MEALS
Qty: 10 TABLET | Refills: 0 | Status: SHIPPED | OUTPATIENT
Start: 2019-02-01 | End: 2019-02-06

## 2019-02-01 RX ORDER — CEPHALEXIN 500 MG/1
500 CAPSULE ORAL EVERY 8 HOURS SCHEDULED
Qty: 30 CAPSULE | Refills: 0 | Status: SHIPPED | OUTPATIENT
Start: 2019-02-01 | End: 2019-02-11

## 2019-02-01 NOTE — PROGRESS NOTES
[address] [place] [address] [facility]        NAME: Darren Strong is a 41 y o  female  : 1980    MRN: [id number]  DATE: 2019  TIME: 4:01 PM    Assessment and Plan   Acute frontal sinusitis, recurrence not specified [J01 10]  1  Acute frontal sinusitis, recurrence not specified  predniSONE 20 mg tablet    cephalexin (KEFLEX) 500 mg capsule   2  Increased stomach acid  cholestyramine (QUESTRAN) 4 g packet         Patient Instructions     Patient Instructions   Ocean Nasal Rinse/NettiPot/Saline Gel  Prednisone and Keflex only if worse or continues  Call or return for problems/concerns  Continue Cholestyramine as directed for increased stomach acid          Chief Complaint     Chief Complaint   Patient presents with    Eye Problem     pressure behind eyes x 1 week         History of Present Illness       Sinus congestion x 1 week- pressure behind the eyes  No fevers    Needs refill for cholestyramine- has been taking for increased stomach acid since she was about 17years old        Review of Systems   Review of Systems   Constitutional: Negative for activity change, diaphoresis, fatigue and fever  HENT: Positive for congestion and rhinorrhea  Negative for facial swelling, hearing loss, sinus pain, sinus pressure, sneezing, sore throat and voice change  Eyes: Negative for discharge and visual disturbance  Respiratory: Negative for cough, choking, chest tightness, shortness of breath, wheezing and stridor  Cardiovascular: Negative for chest pain, palpitations and leg swelling  Gastrointestinal: Negative for abdominal distention, abdominal pain, constipation, diarrhea, nausea and vomiting  Endocrine: Negative for polydipsia, polyphagia and polyuria  Genitourinary: Negative for difficulty urinating, dysuria, frequency and urgency  Musculoskeletal: Negative for arthralgias, back pain, gait problem, joint swelling, myalgias, neck pain and neck stiffness     Skin: Negative for color change, rash and wound  Neurological: Negative for dizziness, syncope, speech difficulty, weakness, light-headedness and headaches  Hematological: Negative for adenopathy  Does not bruise/bleed easily  Psychiatric/Behavioral: Negative for agitation, behavioral problems, confusion, hallucinations, sleep disturbance and suicidal ideas  The patient is not nervous/anxious            Current Medications       Current Outpatient Prescriptions:     cholestyramine (QUESTRAN) 4 g packet, Take 1 packet (4 g total) by mouth daily, Disp: 60 packet, Rfl: 1    XIIDRA 5 % op solution, , Disp: , Rfl: 0    cephalexin (KEFLEX) 500 mg capsule, Take 1 capsule (500 mg total) by mouth every 8 (eight) hours for 10 days, Disp: 30 capsule, Rfl: 0    predniSONE 20 mg tablet, Take 1 tablet (20 mg total) by mouth 2 (two) times a day with meals for 5 days, Disp: 10 tablet, Rfl: 0    SUMAtriptan (IMITREX) 100 mg tablet, Take 1 tablet by mouth once as needed  , Disp: , Rfl:     Current Allergies     Allergies as of 02/01/2019 - Reviewed 02/01/2019   Allergen Reaction Noted    Hydrocodone Anaphylaxis 09/27/2018    Morphine Palpitations     Morphine and related Anaphylaxis and Palpitations 02/12/2014    Oxycodone Anaphylaxis 09/27/2018    Penicillins Fever and Other (See Comments) 02/12/2014    Clomid [clomiphene]  09/25/2018    Sulfa antibiotics Fever 08/28/2017            The following portions of the patient's history were reviewed and updated as appropriate: allergies, current medications, past family history, past medical history, past social history, past surgical history and problem list      Past Medical History:   Diagnosis Date    Migraine     Ovarian cyst     Palpitations     Tachycardia     Uterine leiomyoma     Last assessed 8/28/2017       Past Surgical History:   Procedure Laterality Date    APPENDECTOMY      CERVICAL BIOPSY  W/ LOOP ELECTRODE EXCISION      Last assessed 8/28/2017    COLONOSCOPY     [facility]  DILATION AND CURETTAGE OF UTERUS      Last assessed 8/28/2017    ESOPHAGOGASTRODUODENOSCOPY      OVARIAN CYST REMOVAL      Last assessed 8/28/2017    TONSILLECTOMY         Family History   Problem Relation Age of Onset    Other Mother         Cardiac Disorder    Heart attack Mother     Arrhythmia Mother     Coronary artery disease Mother     Hyperlipidemia Mother     Aortic aneurysm Father         Abdomincal aortic aneurysm    Hypertension Father     Cerebral aneurysm Family     Heart failure Paternal Grandmother     Stroke Neg Hx     Clotting disorder Neg Hx     Cancer Neg Hx          Medications have been verified  Objective   /76 (BP Location: Left arm, Patient Position: Sitting, Cuff Size: Adult)   Pulse 68   Temp 98 8 °F (37 1 °C) (Tympanic)   Resp 16   Ht 5' 8" (1 727 m)   Wt 61 7 kg (136 lb)   SpO2 97%   BMI 20 68 kg/m²        Physical Exam     Physical Exam   Constitutional: She is oriented to person, place, and time  She appears well-developed and well-nourished  No distress  HENT:   Head: Normocephalic and atraumatic  Right Ear: Tympanic membrane, external ear and ear canal normal    Left Ear: Tympanic membrane, external ear and ear canal normal    Nose: Rhinorrhea present  No epistaxis  Right sinus exhibits no maxillary sinus tenderness and no frontal sinus tenderness  Left sinus exhibits no maxillary sinus tenderness and no frontal sinus tenderness  Mouth/Throat: Uvula is midline, oropharynx is clear and moist and mucous membranes are normal    Eyes: Pupils are equal, round, and reactive to light  Conjunctivae and EOM are normal  Right eye exhibits no discharge  Left eye exhibits no discharge  Neck: Normal range of motion  Neck supple  No tracheal deviation present  Cardiovascular: Normal rate, regular rhythm and normal heart sounds  Exam reveals no gallop and no friction rub  No murmur heard    Pulmonary/Chest: Effort normal and breath sounds normal  No respiratory distress  She has no wheezes  She has no rales  Abdominal: She exhibits no distension  Musculoskeletal: Normal range of motion  Lymphadenopathy:     She has no cervical adenopathy  Neurological: She is alert and oriented to person, place, and time  Skin: Skin is warm and dry  No rash noted  She is not diaphoretic  No erythema  Psychiatric: She has a normal mood and affect  Her behavior is normal  Judgment and thought content normal    Nursing note and vitals reviewed

## 2019-02-01 NOTE — PATIENT INSTRUCTIONS
Ocean Nasal Rinse/NettiPot/Saline Gel  Prednisone and Keflex only if worse or continues  Call or return for problems/concerns  Continue Cholestyramine as directed for increased stomach acid

## 2019-02-07 ENCOUNTER — OFFICE VISIT (OUTPATIENT)
Dept: URGENT CARE | Facility: CLINIC | Age: 39
End: 2019-02-07
Payer: COMMERCIAL

## 2019-02-07 VITALS
HEART RATE: 76 BPM | TEMPERATURE: 99.1 F | OXYGEN SATURATION: 100 % | SYSTOLIC BLOOD PRESSURE: 110 MMHG | DIASTOLIC BLOOD PRESSURE: 76 MMHG | RESPIRATION RATE: 18 BRPM

## 2019-02-07 DIAGNOSIS — N39.0 URINARY TRACT INFECTION WITHOUT HEMATURIA, SITE UNSPECIFIED: Primary | ICD-10-CM

## 2019-02-07 LAB
SL AMB  POCT GLUCOSE, UA: ABNORMAL
SL AMB LEUKOCYTE ESTERASE,UA: ABNORMAL
SL AMB POCT BILIRUBIN,UA: ABNORMAL
SL AMB POCT BLOOD,UA: ABNORMAL
SL AMB POCT CLARITY,UA: CLEAR
SL AMB POCT COLOR,UA: ABNORMAL
SL AMB POCT KETONES,UA: ABNORMAL
SL AMB POCT NITRITE,UA: ABNORMAL
SL AMB POCT PH,UA: 5
SL AMB POCT SPECIFIC GRAVITY,UA: 1
SL AMB POCT URINE PROTEIN: ABNORMAL
SL AMB POCT UROBILINOGEN: 0.2

## 2019-02-07 PROCEDURE — 87086 URINE CULTURE/COLONY COUNT: CPT | Performed by: NURSE PRACTITIONER

## 2019-02-07 PROCEDURE — 99213 OFFICE O/P EST LOW 20 MIN: CPT

## 2019-02-07 PROCEDURE — S9088 SERVICES PROVIDED IN URGENT: HCPCS

## 2019-02-07 RX ORDER — NITROFURANTOIN 25; 75 MG/1; MG/1
100 CAPSULE ORAL 2 TIMES DAILY
Qty: 14 CAPSULE | Refills: 0 | Status: SHIPPED | OUTPATIENT
Start: 2019-02-07 | End: 2019-02-14

## 2019-02-07 NOTE — PATIENT INSTRUCTIONS

## 2019-02-07 NOTE — PROGRESS NOTES
[facility] [employer] [facility]        NAME: Deepak Penny is a 41 y o  female  : 1980    MRN: [id number]  DATE: 2019  TIME: 4:09 PM    Assessment and Plan   Urinary tract infection without hematuria, site unspecified [N39 0]  1  Urinary tract infection without hematuria, site unspecified  POCT urine dip    Urine culture    nitrofurantoin (MACROBID) 100 mg capsule         Patient Instructions       Follow up with PCP in 3-5 days  Proceed to  ER if symptoms worsen  Chief Complaint     Chief Complaint   Patient presents with    Possible UTI         History of Present Illness       82-year-old female at urgent care with chief complaint of urinary frequency urinary urgency and suprapubic pressure for the past day  Denies any back pain fevers or chills      Urinary Tract Infection    This is a new problem  The current episode started today  The problem occurs every urination  The problem has been unchanged  The quality of the pain is described as burning  The pain is at a severity of 3/10  The pain is mild  There has been no fever  She is sexually active  There is a history of pyelonephritis  Associated symptoms include frequency and urgency  Pertinent negatives include no chills, discharge, flank pain, hematuria, hesitancy, nausea, possible pregnancy, sweats or vomiting  She has tried nothing for the symptoms  The treatment provided no relief  Her past medical history is significant for recurrent UTIs  Review of Systems   Review of Systems   Constitutional: Negative  Negative for chills  HENT: Negative  Eyes: Negative  Respiratory: Negative  Cardiovascular: Negative  Gastrointestinal: Negative  Negative for nausea and vomiting  Endocrine: Negative  Genitourinary: Positive for dysuria, frequency and urgency   Negative for decreased urine volume, difficulty urinating, dyspareunia, enuresis, flank pain, genital sores, hematuria, hesitancy, menstrual problem, pelvic pain, vaginal bleeding, vaginal discharge and vaginal pain  Musculoskeletal: Negative  Skin: Negative  Allergic/Immunologic: Negative  Neurological: Negative  Hematological: Negative  Psychiatric/Behavioral: Negative  All other systems reviewed and are negative          Current Medications       Current Outpatient Prescriptions:     cephalexin (KEFLEX) 500 mg capsule, Take 1 capsule (500 mg total) by mouth every 8 (eight) hours for 10 days, Disp: 30 capsule, Rfl: 0    cholestyramine (QUESTRAN) 4 g packet, Take 1 packet (4 g total) by mouth daily, Disp: 60 packet, Rfl: 1    nitrofurantoin (MACROBID) 100 mg capsule, Take 1 capsule (100 mg total) by mouth 2 (two) times a day for 7 days, Disp: 14 capsule, Rfl: 0    SUMAtriptan (IMITREX) 100 mg tablet, Take 1 tablet by mouth once as needed  , Disp: , Rfl:     XIIDRA 5 % op solution, , Disp: , Rfl: 0    Current Allergies     Allergies as of 02/07/2019 - Reviewed 02/01/2019   Allergen Reaction Noted    Hydrocodone Anaphylaxis 09/27/2018    Morphine Palpitations     Morphine and related Anaphylaxis and Palpitations 02/12/2014    Oxycodone Anaphylaxis 09/27/2018    Penicillins Fever and Other (See Comments) 02/12/2014    Clomid [clomiphene]  09/25/2018    Sulfa antibiotics Fever 08/28/2017            The following portions of the patient's history were reviewed and updated as appropriate: allergies, current medications, past family history, past medical history, past social history, past surgical history and problem list      Past Medical History:   Diagnosis Date    Migraine     Ovarian cyst     Palpitations     Tachycardia     Uterine leiomyoma     Last assessed 8/28/2017       Past Surgical History:   Procedure Laterality Date    APPENDECTOMY      CERVICAL BIOPSY  W/ LOOP ELECTRODE EXCISION      Last assessed 8/28/2017    COLONOSCOPY      DENTAL SURGERY      DILATION AND CURETTAGE OF UTERUS      Last assessed 8/28/2017    ESOPHAGOGASTRODUODENOSCOPY      OVARIAN CYST REMOVAL      Last assessed 8/28/2017    TONSILLECTOMY         Family History   Problem Relation Age of Onset    Other Mother         Cardiac Disorder    Heart attack Mother     Arrhythmia Mother     Coronary artery disease Mother     Hyperlipidemia Mother     Aortic aneurysm Father         Abdomincal aortic aneurysm    Hypertension Father     Cerebral aneurysm Family     Heart failure Paternal Grandmother     Stroke Neg Hx     Clotting disorder Neg Hx     Cancer Neg Hx          Medications have been verified  Objective   /76 (BP Location: Right arm, Patient Position: Sitting)   Pulse 76   Temp 99 1 °F (37 3 °C) (Tympanic)   Resp 18   SpO2 100%        Physical Exam     Physical Exam   Constitutional: She is oriented to person, place, and time  Vital signs are normal  She appears well-developed  HENT:   Head: Normocephalic and atraumatic  Right Ear: External ear normal    Left Ear: External ear normal    Nose: Nose normal    Mouth/Throat: Oropharynx is clear and moist    Eyes: Pupils are equal, round, and reactive to light  Conjunctivae, EOM and lids are normal    Neck: Normal range of motion and full passive range of motion without pain  Neck supple  Cardiovascular: Normal rate, regular rhythm, normal heart sounds and intact distal pulses  Pulmonary/Chest: Effort normal and breath sounds normal    Abdominal: Soft  Normal appearance and bowel sounds are normal  There is tenderness in the suprapubic area  There is no CVA tenderness  Musculoskeletal: Normal range of motion  Neurological: She is alert and oriented to person, place, and time  She has normal reflexes  Skin: Skin is warm, dry and intact  Psychiatric: She has a normal mood and affect  Her speech is normal and behavior is normal  Judgment and thought content normal    Nursing note and vitals reviewed

## 2019-02-08 LAB
BACTERIA UR CULT: ABNORMAL
BACTERIA UR CULT: ABNORMAL

## 2019-02-11 ENCOUNTER — APPOINTMENT (OUTPATIENT)
Dept: LAB | Facility: CLINIC | Age: 39
End: 2019-02-11
Payer: COMMERCIAL

## 2019-02-11 ENCOUNTER — TELEPHONE (OUTPATIENT)
Dept: FAMILY MEDICINE CLINIC | Facility: CLINIC | Age: 39
End: 2019-02-11

## 2019-02-11 DIAGNOSIS — N39.0 RECURRENT UTI (URINARY TRACT INFECTION): ICD-10-CM

## 2019-02-11 DIAGNOSIS — N39.0 RECURRENT UTI (URINARY TRACT INFECTION): Primary | ICD-10-CM

## 2019-02-11 LAB
ALBUMIN SERPL BCP-MCNC: 4 G/DL (ref 3.5–5)
ALP SERPL-CCNC: 70 U/L (ref 46–116)
ALT SERPL W P-5'-P-CCNC: 18 U/L (ref 12–78)
ANION GAP SERPL CALCULATED.3IONS-SCNC: 6 MMOL/L (ref 4–13)
AST SERPL W P-5'-P-CCNC: 14 U/L (ref 5–45)
BASOPHILS # BLD AUTO: 0.05 THOUSANDS/ΜL (ref 0–0.1)
BASOPHILS NFR BLD AUTO: 1 % (ref 0–1)
BILIRUB SERPL-MCNC: 0.65 MG/DL (ref 0.2–1)
BUN SERPL-MCNC: 10 MG/DL (ref 5–25)
CALCIUM SERPL-MCNC: 9.1 MG/DL (ref 8.3–10.1)
CHLORIDE SERPL-SCNC: 104 MMOL/L (ref 100–108)
CO2 SERPL-SCNC: 28 MMOL/L (ref 21–32)
CREAT SERPL-MCNC: 0.92 MG/DL (ref 0.6–1.3)
EOSINOPHIL # BLD AUTO: 0.15 THOUSAND/ΜL (ref 0–0.61)
EOSINOPHIL NFR BLD AUTO: 2 % (ref 0–6)
ERYTHROCYTE [DISTWIDTH] IN BLOOD BY AUTOMATED COUNT: 12.4 % (ref 11.6–15.1)
GFR SERPL CREATININE-BSD FRML MDRD: 80 ML/MIN/1.73SQ M
GLUCOSE P FAST SERPL-MCNC: 66 MG/DL (ref 65–99)
HCT VFR BLD AUTO: 43.7 % (ref 34.8–46.1)
HGB BLD-MCNC: 14.1 G/DL (ref 11.5–15.4)
IMM GRANULOCYTES # BLD AUTO: 0.02 THOUSAND/UL (ref 0–0.2)
IMM GRANULOCYTES NFR BLD AUTO: 0 % (ref 0–2)
LYMPHOCYTES # BLD AUTO: 2.47 THOUSANDS/ΜL (ref 0.6–4.47)
LYMPHOCYTES NFR BLD AUTO: 25 % (ref 14–44)
MCH RBC QN AUTO: 30 PG (ref 26.8–34.3)
MCHC RBC AUTO-ENTMCNC: 32.3 G/DL (ref 31.4–37.4)
MCV RBC AUTO: 93 FL (ref 82–98)
MONOCYTES # BLD AUTO: 0.79 THOUSAND/ΜL (ref 0.17–1.22)
MONOCYTES NFR BLD AUTO: 8 % (ref 4–12)
NEUTROPHILS # BLD AUTO: 6.4 THOUSANDS/ΜL (ref 1.85–7.62)
NEUTS SEG NFR BLD AUTO: 64 % (ref 43–75)
NRBC BLD AUTO-RTO: 0 /100 WBCS
PLATELET # BLD AUTO: 248 THOUSANDS/UL (ref 149–390)
PMV BLD AUTO: 9.9 FL (ref 8.9–12.7)
POTASSIUM SERPL-SCNC: 4.6 MMOL/L (ref 3.5–5.3)
PROT SERPL-MCNC: 6.9 G/DL (ref 6.4–8.2)
RBC # BLD AUTO: 4.7 MILLION/UL (ref 3.81–5.12)
SODIUM SERPL-SCNC: 138 MMOL/L (ref 136–145)
WBC # BLD AUTO: 9.88 THOUSAND/UL (ref 4.31–10.16)

## 2019-02-11 PROCEDURE — 85025 COMPLETE CBC W/AUTO DIFF WBC: CPT

## 2019-02-11 PROCEDURE — 36415 COLL VENOUS BLD VENIPUNCTURE: CPT

## 2019-02-11 PROCEDURE — 80053 COMPREHEN METABOLIC PANEL: CPT

## 2019-02-11 RX ORDER — CEFUROXIME AXETIL 500 MG/1
500 TABLET ORAL EVERY 12 HOURS SCHEDULED
Qty: 20 TABLET | Refills: 0 | Status: SHIPPED | OUTPATIENT
Start: 2019-02-11 | End: 2019-02-21

## 2019-02-11 NOTE — TELEPHONE ENCOUNTER
I spoke with pt  On the phone- she was recently dx with a UTI at [facility]  The macrobid has mildly improved her symptoms- last time she had a UTI with kidney failure, she was on Macrobid, then Levaquin, then IV Rocephin, then PO Ceftin  She would like labs ordered to rule out kidney failure and Ceftin PO to [facility]  Pt  Will make f/u appt with GYN to r/o Vaginal possibilities for recurrent UTIs/ Lacto Bacillus bacterial infections

## 2019-02-22 ENCOUNTER — APPOINTMENT (OUTPATIENT)
Dept: LAB | Facility: CLINIC | Age: 39
End: 2019-02-22
Payer: COMMERCIAL

## 2019-02-22 DIAGNOSIS — N39.0 RECURRENT UTI (URINARY TRACT INFECTION): Primary | ICD-10-CM

## 2019-02-22 LAB
BILIRUB UR QL STRIP: NEGATIVE
CLARITY UR: CLEAR
COLOR UR: YELLOW
GLUCOSE UR STRIP-MCNC: NEGATIVE MG/DL
HGB UR QL STRIP.AUTO: NEGATIVE
KETONES UR STRIP-MCNC: NEGATIVE MG/DL
LEUKOCYTE ESTERASE UR QL STRIP: NEGATIVE
NITRITE UR QL STRIP: NEGATIVE
PH UR STRIP.AUTO: 6 [PH] (ref 4.5–8)
PROT UR STRIP-MCNC: NEGATIVE MG/DL
SP GR UR STRIP.AUTO: 1.01 (ref 1–1.03)
UROBILINOGEN UR QL STRIP.AUTO: 0.2 E.U./DL

## 2019-02-22 PROCEDURE — 81003 URINALYSIS AUTO W/O SCOPE: CPT | Performed by: NURSE PRACTITIONER

## 2019-03-27 ENCOUNTER — LAB REQUISITION (OUTPATIENT)
Dept: LAB | Facility: HOSPITAL | Age: 39
End: 2019-03-27
Payer: COMMERCIAL

## 2019-03-27 DIAGNOSIS — N89.8 OTHER SPECIFIED NONINFLAMMATORY DISORDERS OF VAGINA: ICD-10-CM

## 2019-03-27 PROCEDURE — 87510 GARDNER VAG DNA DIR PROBE: CPT | Performed by: SPECIALIST

## 2019-03-27 PROCEDURE — 87660 TRICHOMONAS VAGIN DIR PROBE: CPT | Performed by: SPECIALIST

## 2019-03-27 PROCEDURE — 87480 CANDIDA DNA DIR PROBE: CPT | Performed by: SPECIALIST

## 2019-03-29 LAB
CANDIDA RRNA VAG QL PROBE: NEGATIVE
G VAGINALIS RRNA GENITAL QL PROBE: NEGATIVE
T VAGINALIS RRNA GENITAL QL PROBE: NEGATIVE

## 2019-06-18 ENCOUNTER — HOSPITAL ENCOUNTER (EMERGENCY)
Facility: HOSPITAL | Age: 39
Discharge: HOME | End: 2019-06-18
Attending: INTERNAL MEDICINE
Payer: COMMERCIAL

## 2019-06-18 VITALS
BODY MASS INDEX: 21.59 KG/M2 | HEART RATE: 77 BPM | TEMPERATURE: 98.7 F | WEIGHT: 142 LBS | SYSTOLIC BLOOD PRESSURE: 117 MMHG | DIASTOLIC BLOOD PRESSURE: 68 MMHG | RESPIRATION RATE: 16 BRPM | OXYGEN SATURATION: 99 %

## 2019-06-18 DIAGNOSIS — R10.13 EPIGASTRIC PAIN: Primary | ICD-10-CM

## 2019-06-18 DIAGNOSIS — K52.9 COLITIS: ICD-10-CM

## 2019-06-18 DIAGNOSIS — R19.7 DIARRHEA, UNSPECIFIED TYPE: ICD-10-CM

## 2019-06-18 LAB
ALBUMIN SERPL BCP-MCNC: 4.2 G/DL (ref 3.5–5.7)
ALP SERPL-CCNC: 52 U/L (ref 40–150)
ALT SERPL W P-5'-P-CCNC: 12 U/L (ref 7–52)
ANION GAP SERPL CALCULATED.3IONS-SCNC: 7 MMOL/L (ref 4–13)
AST SERPL W P-5'-P-CCNC: 22 U/L (ref 13–39)
BACTERIA UR QL AUTO: ABNORMAL /HPF
BASOPHILS # BLD AUTO: 0 THOUSANDS/ΜL (ref 0–0.1)
BASOPHILS NFR BLD AUTO: 0 % (ref 0–2)
BILIRUB SERPL-MCNC: 0.5 MG/DL (ref 0.2–1)
BILIRUB UR QL STRIP: NEGATIVE
BUN SERPL-MCNC: 8 MG/DL (ref 7–25)
CALCIUM SERPL-MCNC: 9.4 MG/DL (ref 8.6–10.5)
CHLORIDE SERPL-SCNC: 103 MMOL/L (ref 98–107)
CLARITY UR: CLEAR
CO2 SERPL-SCNC: 27 MMOL/L (ref 21–31)
COLOR UR: YELLOW
CREAT SERPL-MCNC: 0.85 MG/DL (ref 0.6–1.2)
EOSINOPHIL # BLD AUTO: 0.1 THOUSAND/ΜL (ref 0–0.61)
EOSINOPHIL NFR BLD AUTO: 1 % (ref 0–5)
ERYTHROCYTE [DISTWIDTH] IN BLOOD BY AUTOMATED COUNT: 13.7 % (ref 11.5–14.5)
GFR SERPL CREATININE-BSD FRML MDRD: 88 ML/MIN/1.73SQ M
GLUCOSE SERPL-MCNC: 100 MG/DL (ref 65–99)
GLUCOSE UR STRIP-MCNC: NEGATIVE MG/DL
HCT VFR BLD AUTO: 38.9 % (ref 42–47)
HGB BLD-MCNC: 13.4 G/DL (ref 12–16)
HGB UR QL STRIP.AUTO: ABNORMAL
KETONES UR STRIP-MCNC: NEGATIVE MG/DL
LEUKOCYTE ESTERASE UR QL STRIP: NEGATIVE
LIPASE SERPL-CCNC: 26 U/L (ref 11–82)
LYMPHOCYTES # BLD AUTO: 2.1 THOUSANDS/ΜL (ref 0.6–4.47)
LYMPHOCYTES NFR BLD AUTO: 34 % (ref 21–51)
MCH RBC QN AUTO: 30.3 PG (ref 26–34)
MCHC RBC AUTO-ENTMCNC: 34.5 G/DL (ref 31–37)
MCV RBC AUTO: 88 FL (ref 81–99)
MONOCYTES # BLD AUTO: 0.8 THOUSAND/ΜL (ref 0.17–1.22)
MONOCYTES NFR BLD AUTO: 12 % (ref 2–12)
NEUTROPHILS # BLD AUTO: 3.3 THOUSANDS/ΜL (ref 1.4–6.5)
NEUTS SEG NFR BLD AUTO: 52 % (ref 42–75)
NITRITE UR QL STRIP: NEGATIVE
NON-SQ EPI CELLS URNS QL MICRO: ABNORMAL /HPF
PH UR STRIP.AUTO: 5.5 [PH]
PLATELET # BLD AUTO: 195 THOUSANDS/UL (ref 149–390)
PMV BLD AUTO: 7.7 FL (ref 8.6–11.7)
POTASSIUM SERPL-SCNC: 4.5 MMOL/L (ref 3.5–5.5)
PROT SERPL-MCNC: 6.8 G/DL (ref 6.4–8.9)
PROT UR STRIP-MCNC: NEGATIVE MG/DL
RBC # BLD AUTO: 4.43 MILLION/UL (ref 3.9–5.2)
RBC #/AREA URNS AUTO: ABNORMAL /HPF
SODIUM SERPL-SCNC: 137 MMOL/L (ref 134–143)
SP GR UR STRIP.AUTO: 1.01 (ref 1–1.03)
UROBILINOGEN UR QL STRIP.AUTO: 0.2 E.U./DL
WBC # BLD AUTO: 6.3 THOUSAND/UL (ref 4.8–10.8)
WBC #/AREA URNS AUTO: ABNORMAL /HPF

## 2019-06-18 PROCEDURE — 85025 COMPLETE CBC W/AUTO DIFF WBC: CPT | Performed by: INTERNAL MEDICINE

## 2019-06-18 PROCEDURE — 99284 EMERGENCY DEPT VISIT MOD MDM: CPT

## 2019-06-18 PROCEDURE — 36415 COLL VENOUS BLD VENIPUNCTURE: CPT | Performed by: INTERNAL MEDICINE

## 2019-06-18 PROCEDURE — 83690 ASSAY OF LIPASE: CPT | Performed by: INTERNAL MEDICINE

## 2019-06-18 PROCEDURE — 81001 URINALYSIS AUTO W/SCOPE: CPT | Performed by: INTERNAL MEDICINE

## 2019-06-18 PROCEDURE — 96360 HYDRATION IV INFUSION INIT: CPT

## 2019-06-18 PROCEDURE — 80053 COMPREHEN METABOLIC PANEL: CPT | Performed by: INTERNAL MEDICINE

## 2019-06-18 RX ORDER — PANTOPRAZOLE SODIUM 40 MG/1
40 TABLET, DELAYED RELEASE ORAL ONCE
Status: COMPLETED | OUTPATIENT
Start: 2019-06-18 | End: 2019-06-18

## 2019-06-18 RX ORDER — DIPHENOXYLATE HYDROCHLORIDE AND ATROPINE SULFATE 2.5; .025 MG/1; MG/1
1 TABLET ORAL 4 TIMES DAILY PRN
Qty: 10 TABLET | Refills: 0 | Status: SHIPPED | OUTPATIENT
Start: 2019-06-18 | End: 2019-06-28

## 2019-06-18 RX ORDER — DICYCLOMINE HCL 20 MG
20 TABLET ORAL ONCE
Status: COMPLETED | OUTPATIENT
Start: 2019-06-18 | End: 2019-06-18

## 2019-06-18 RX ORDER — DICYCLOMINE HCL 20 MG
20 TABLET ORAL 3 TIMES DAILY PRN
Qty: 20 TABLET | Refills: 0 | Status: SHIPPED | OUTPATIENT
Start: 2019-06-18 | End: 2019-09-12

## 2019-06-18 RX ADMIN — PANTOPRAZOLE SODIUM 40 MG: 40 TABLET, DELAYED RELEASE ORAL at 22:44

## 2019-06-18 RX ADMIN — DICYCLOMINE HYDROCHLORIDE 20 MG: 20 TABLET ORAL at 22:44

## 2019-06-18 RX ADMIN — SODIUM CHLORIDE 1000 ML: 0.9 INJECTION, SOLUTION INTRAVENOUS at 21:27

## 2019-08-05 DIAGNOSIS — N97.9 INFERTILITY, FEMALE: Primary | ICD-10-CM

## 2019-08-06 ENCOUNTER — APPOINTMENT (OUTPATIENT)
Dept: LAB | Facility: CLINIC | Age: 39
End: 2019-08-06
Payer: COMMERCIAL

## 2019-08-06 DIAGNOSIS — N97.9 INFERTILITY, FEMALE: ICD-10-CM

## 2019-08-06 PROCEDURE — 83516 IMMUNOASSAY NONANTIBODY: CPT

## 2019-08-10 LAB — MIS SERPL-MCNC: 0.61 NG/ML

## 2019-09-12 ENCOUNTER — OFFICE VISIT (OUTPATIENT)
Dept: FAMILY MEDICINE CLINIC | Facility: CLINIC | Age: 39
End: 2019-09-12
Payer: COMMERCIAL

## 2019-09-12 VITALS
HEART RATE: 75 BPM | SYSTOLIC BLOOD PRESSURE: 118 MMHG | RESPIRATION RATE: 18 BRPM | HEIGHT: 68 IN | WEIGHT: 142 LBS | BODY MASS INDEX: 21.52 KG/M2 | OXYGEN SATURATION: 98 % | DIASTOLIC BLOOD PRESSURE: 70 MMHG | TEMPERATURE: 98.7 F

## 2019-09-12 DIAGNOSIS — R59.9 SWOLLEN LYMPH NODES: ICD-10-CM

## 2019-09-12 DIAGNOSIS — R63.5 WEIGHT GAIN: ICD-10-CM

## 2019-09-12 DIAGNOSIS — R22.1 NECK SWELLING: Primary | ICD-10-CM

## 2019-09-12 DIAGNOSIS — N17.9 AKI (ACUTE KIDNEY INJURY) (HCC): ICD-10-CM

## 2019-09-12 DIAGNOSIS — R13.10 DYSPHAGIA, UNSPECIFIED TYPE: ICD-10-CM

## 2019-09-12 PROCEDURE — 99214 OFFICE O/P EST MOD 30 MIN: CPT | Performed by: NURSE PRACTITIONER

## 2019-09-12 PROCEDURE — 3008F BODY MASS INDEX DOCD: CPT | Performed by: NURSE PRACTITIONER

## 2019-09-12 NOTE — PROGRESS NOTES
[address] [facility]        NAME: Yinka Lamar is a 41 y o  female  : 1980    MRN: [id number]  DATE: 2019  TIME: 4:46 PM    Assessment and Plan   [facility] [N17 9]  1  ANDRA (acute kidney injury) ([facility] )  Comprehensive metabolic panel   2  Swollen lymph nodes  CBC and differential    EBV acute panel   3  Neck swelling  TSH, 3rd generation    CBC and differential    US thyroid   4  Weight gain  TSH, 3rd generation    US thyroid    EBV acute panel       If all testing is negative and ongoing throat pain/swelling will refer to GI for possible EGD  Patient Instructions     Patient Instructions    Follow up with Kris Meehan to review results  Get labs and ultrasound done  Chief Complaint     Chief Complaint   Patient presents with    Swollen Glands     thinks its her thyroid     had lump     under arm pit          History of Present Illness       Patient here with c/o swollen tight throat x 4 days ago  Reports she was at work eating a tuna sandwich and then started to have swelling of her throat and trouble swallowing  Took zantac  Reports she is still having trouble swallowing, pain in her neck and throat, swollen lymph nodes  Keflex 3 times a day x 3 days and prednisone x 3 days with minimal relief  Feels like it is slightly better but that there is something wrong  Reports she never gets the typical symptoms when she gets sick  Has been taking benadryl at night In case this would be from post nasal drip sinus issues but not having any congestion/rhinorrhea  Review of Systems   Review of Systems   Constitutional: Negative for activity change, appetite change, chills, fatigue and fever  HENT: Negative for congestion, ear pain, nosebleeds, rhinorrhea and sore throat  Eyes: Negative for photophobia, pain, redness and visual disturbance  Respiratory: Negative for cough, shortness of breath and wheezing      Cardiovascular: Negative  Negative for chest pain  Gastrointestinal: Negative  Negative for abdominal pain, constipation, diarrhea and vomiting  Endocrine: Negative  Genitourinary: Negative for difficulty urinating, dysuria and flank pain  Musculoskeletal: Positive for neck pain (anterior neck pain with turning and to touch)  Skin: Negative for color change and rash  Neurological: Negative for dizziness, weakness, numbness and headaches  Hematological: Positive for adenopathy  Psychiatric/Behavioral: Negative for agitation and confusion  The patient is not nervous/anxious          PHQ-9 Depression Screening    PHQ-9:    Frequency of the following problems over the past two weeks:       Little interest or pleasure in doing things:  0 - not at all  Feeling down, depressed, or hopeless:  0 - not at all  PHQ-2 Score:  0        Current Medications       Current Outpatient Medications:     cholestyramine (QUESTRAN) 4 g packet, Take 1 packet (4 g total) by mouth daily, Disp: 60 packet, Rfl: 1    Current Allergies     Allergies as of 09/12/2019 - Reviewed 09/12/2019   Allergen Reaction Noted    Hydrocodone Anaphylaxis 09/27/2018    Morphine Palpitations     Morphine and related Anaphylaxis and Palpitations 02/12/2014    Oxycodone Anaphylaxis 09/27/2018    Penicillins Fever and Other (See Comments) 02/12/2014    Clomid [clomiphene]  09/25/2018    Sulfa antibiotics Fever 08/28/2017            The following portions of the patient's history were reviewed and updated as appropriate: allergies, current medications, past family history, past medical history, past social history, past surgical history and problem list      Past Medical History:   Diagnosis Date    Migraine     Ovarian cyst     Palpitations     Renal disorder     Tachycardia     Uterine leiomyoma     Last assessed 8/28/2017       Past Surgical History:   Procedure Laterality Date    APPENDECTOMY      CERVICAL BIOPSY  W/ LOOP ELECTRODE EXCISION Last assessed 8/28/2017    COLONOSCOPY      DENTAL SURGERY      DILATION AND CURETTAGE OF UTERUS      Last assessed 8/28/2017    ESOPHAGOGASTRODUODENOSCOPY      OVARIAN CYST REMOVAL      Last assessed 8/28/2017    TONSILLECTOMY         Family History   Problem Relation Age of Onset    Other Mother         Cardiac Disorder    Heart attack Mother     Arrhythmia Mother     Coronary artery disease Mother     Hyperlipidemia Mother     Aortic aneurysm Father         Abdomincal aortic aneurysm    Hypertension Father     Cerebral aneurysm Family     Heart failure Paternal Grandmother     Stroke Neg Hx     Clotting disorder Neg Hx     Cancer Neg Hx          Medications have been verified  Objective   /70   Pulse 75   Temp 98 7 °F (37 1 °C)   Resp 18   Ht 5' 8" (1 727 m)   Wt 64 4 kg (142 lb)   SpO2 98%   BMI 21 59 kg/m²        Physical Exam     Physical Exam   Constitutional: She is oriented to person, place, and time  Vital signs are normal  She appears well-developed and well-nourished  She is cooperative  She does not appear ill  No distress  HENT:   Head: Normocephalic and atraumatic  Right Ear: Tympanic membrane, external ear and ear canal normal    Left Ear: Tympanic membrane, external ear and ear canal normal    Nose: Nose normal  No mucosal edema or rhinorrhea  Mouth/Throat: Uvula is midline, oropharynx is clear and moist and mucous membranes are normal  No oropharyngeal exudate, posterior oropharyngeal edema or posterior oropharyngeal erythema  Eyes: Lids are normal    Neck:   +mild swelling anterior neck  +ttp  Cardiovascular: Normal rate, regular rhythm, S1 normal, S2 normal, normal heart sounds and intact distal pulses  Exam reveals no gallop and no friction rub  No murmur heard  Pulmonary/Chest: Effort normal and breath sounds normal  No respiratory distress  She has no decreased breath sounds  She has no wheezes  Abdominal: Normal appearance  Musculoskeletal: Normal range of motion  She exhibits no edema, tenderness or deformity  Lymphadenopathy:        Head (right side): Submandibular and tonsillar adenopathy present  Head (left side): Submandibular and tonsillar adenopathy present  She has cervical adenopathy  She has axillary adenopathy  Neurological: She is alert and oriented to person, place, and time  Skin: Skin is warm  No rash noted  She is not diaphoretic  No erythema  Psychiatric: She has a normal mood and affect  Her behavior is normal  Thought content normal    Nursing note and vitals reviewed

## 2019-09-13 ENCOUNTER — APPOINTMENT (OUTPATIENT)
Dept: LAB | Facility: CLINIC | Age: 39
End: 2019-09-13
Payer: COMMERCIAL

## 2019-09-13 DIAGNOSIS — R22.1 NECK SWELLING: ICD-10-CM

## 2019-09-13 DIAGNOSIS — R63.5 WEIGHT GAIN: ICD-10-CM

## 2019-09-13 DIAGNOSIS — R59.9 SWOLLEN LYMPH NODES: ICD-10-CM

## 2019-09-13 DIAGNOSIS — N17.9 AKI (ACUTE KIDNEY INJURY) (HCC): ICD-10-CM

## 2019-09-13 LAB
ALBUMIN SERPL BCP-MCNC: 4.2 G/DL (ref 3.5–5)
ALP SERPL-CCNC: 67 U/L (ref 46–116)
ALT SERPL W P-5'-P-CCNC: 15 U/L (ref 12–78)
ANION GAP SERPL CALCULATED.3IONS-SCNC: 4 MMOL/L (ref 4–13)
AST SERPL W P-5'-P-CCNC: 9 U/L (ref 5–45)
BASOPHILS # BLD AUTO: 0.04 THOUSANDS/ΜL (ref 0–0.1)
BASOPHILS NFR BLD AUTO: 0 % (ref 0–1)
BILIRUB SERPL-MCNC: 0.41 MG/DL (ref 0.2–1)
BUN SERPL-MCNC: 9 MG/DL (ref 5–25)
CALCIUM SERPL-MCNC: 9.1 MG/DL (ref 8.3–10.1)
CHLORIDE SERPL-SCNC: 107 MMOL/L (ref 100–108)
CO2 SERPL-SCNC: 29 MMOL/L (ref 21–32)
CREAT SERPL-MCNC: 0.9 MG/DL (ref 0.6–1.3)
EOSINOPHIL # BLD AUTO: 0.18 THOUSAND/ΜL (ref 0–0.61)
EOSINOPHIL NFR BLD AUTO: 2 % (ref 0–6)
ERYTHROCYTE [DISTWIDTH] IN BLOOD BY AUTOMATED COUNT: 13.2 % (ref 11.6–15.1)
GFR SERPL CREATININE-BSD FRML MDRD: 83 ML/MIN/1.73SQ M
GLUCOSE P FAST SERPL-MCNC: 80 MG/DL (ref 65–99)
HCT VFR BLD AUTO: 41.9 % (ref 34.8–46.1)
HGB BLD-MCNC: 13.4 G/DL (ref 11.5–15.4)
IMM GRANULOCYTES # BLD AUTO: 0.02 THOUSAND/UL (ref 0–0.2)
IMM GRANULOCYTES NFR BLD AUTO: 0 % (ref 0–2)
LYMPHOCYTES # BLD AUTO: 2.83 THOUSANDS/ΜL (ref 0.6–4.47)
LYMPHOCYTES NFR BLD AUTO: 31 % (ref 14–44)
MCH RBC QN AUTO: 29.2 PG (ref 26.8–34.3)
MCHC RBC AUTO-ENTMCNC: 32 G/DL (ref 31.4–37.4)
MCV RBC AUTO: 91 FL (ref 82–98)
MONOCYTES # BLD AUTO: 0.65 THOUSAND/ΜL (ref 0.17–1.22)
MONOCYTES NFR BLD AUTO: 7 % (ref 4–12)
NEUTROPHILS # BLD AUTO: 5.52 THOUSANDS/ΜL (ref 1.85–7.62)
NEUTS SEG NFR BLD AUTO: 60 % (ref 43–75)
NRBC BLD AUTO-RTO: 0 /100 WBCS
PLATELET # BLD AUTO: 236 THOUSANDS/UL (ref 149–390)
PMV BLD AUTO: 9.8 FL (ref 8.9–12.7)
POTASSIUM SERPL-SCNC: 4 MMOL/L (ref 3.5–5.3)
PROT SERPL-MCNC: 7 G/DL (ref 6.4–8.2)
RBC # BLD AUTO: 4.59 MILLION/UL (ref 3.81–5.12)
SODIUM SERPL-SCNC: 140 MMOL/L (ref 136–145)
TSH SERPL DL<=0.05 MIU/L-ACNC: 2.43 UIU/ML (ref 0.36–3.74)
WBC # BLD AUTO: 9.24 THOUSAND/UL (ref 4.31–10.16)

## 2019-09-13 PROCEDURE — 80053 COMPREHEN METABOLIC PANEL: CPT

## 2019-09-13 PROCEDURE — 86664 EPSTEIN-BARR NUCLEAR ANTIGEN: CPT

## 2019-09-13 PROCEDURE — 86663 EPSTEIN-BARR ANTIBODY: CPT

## 2019-09-13 PROCEDURE — 85025 COMPLETE CBC W/AUTO DIFF WBC: CPT

## 2019-09-13 PROCEDURE — 84443 ASSAY THYROID STIM HORMONE: CPT

## 2019-09-13 PROCEDURE — 86665 EPSTEIN-BARR CAPSID VCA: CPT

## 2019-09-13 PROCEDURE — 36415 COLL VENOUS BLD VENIPUNCTURE: CPT

## 2019-09-14 LAB
EBV EA IGG SER-ACNC: <9 U/ML (ref 0–8.9)
EBV NA IGG SER IA-ACNC: 442 U/ML (ref 0–17.9)
EBV PATRN SPEC IB-IMP: ABNORMAL
EBV VCA IGG SER IA-ACNC: >600 U/ML (ref 0–17.9)
EBV VCA IGM SER IA-ACNC: <36 U/ML (ref 0–35.9)

## 2019-10-22 ENCOUNTER — TELEPHONE (OUTPATIENT)
Dept: FAMILY MEDICINE CLINIC | Facility: CLINIC | Age: 39
End: 2019-10-22

## 2019-10-22 DIAGNOSIS — N39.0 ACUTE UTI: Primary | ICD-10-CM

## 2019-10-22 DIAGNOSIS — R11.2 NAUSEA AND VOMITING, INTRACTABILITY OF VOMITING NOT SPECIFIED, UNSPECIFIED VOMITING TYPE: ICD-10-CM

## 2019-10-22 RX ORDER — CEPHALEXIN 500 MG/1
500 CAPSULE ORAL EVERY 8 HOURS SCHEDULED
Qty: 30 CAPSULE | Refills: 0 | Status: SHIPPED | OUTPATIENT
Start: 2019-10-22 | End: 2019-11-01

## 2019-10-22 RX ORDER — ONDANSETRON 4 MG/1
4 TABLET, ORALLY DISINTEGRATING ORAL EVERY 6 HOURS PRN
Qty: 30 TABLET | Refills: 0 | Status: SHIPPED | OUTPATIENT
Start: 2019-10-22 | End: 2019-11-21

## 2019-10-22 NOTE — TELEPHONE ENCOUNTER
Patient calling with complaints of uti  Asking if she can have kelex and zofran called to the pharmacy   Would like them to sent to [facility] in [place] [address]

## 2019-11-21 ENCOUNTER — TELEPHONE (OUTPATIENT)
Dept: FAMILY MEDICINE CLINIC | Facility: CLINIC | Age: 39
End: 2019-11-21

## 2019-11-21 ENCOUNTER — TRANSCRIBE ORDERS (OUTPATIENT)
Dept: LAB | Facility: CLINIC | Age: 39
End: 2019-11-21

## 2019-11-21 ENCOUNTER — HOSPITAL ENCOUNTER (EMERGENCY)
Facility: HOSPITAL | Age: 39
Discharge: HOME | End: 2019-11-21
Attending: EMERGENCY MEDICINE | Admitting: EMERGENCY MEDICINE
Payer: COMMERCIAL

## 2019-11-21 ENCOUNTER — APPOINTMENT (OUTPATIENT)
Dept: LAB | Facility: CLINIC | Age: 39
End: 2019-11-21
Payer: COMMERCIAL

## 2019-11-21 ENCOUNTER — OFFICE VISIT (OUTPATIENT)
Dept: FAMILY MEDICINE CLINIC | Facility: CLINIC | Age: 39
End: 2019-11-21
Payer: COMMERCIAL

## 2019-11-21 VITALS
DIASTOLIC BLOOD PRESSURE: 68 MMHG | HEIGHT: 68 IN | RESPIRATION RATE: 18 BRPM | WEIGHT: 140 LBS | OXYGEN SATURATION: 100 % | BODY MASS INDEX: 21.22 KG/M2 | HEART RATE: 74 BPM | TEMPERATURE: 98.8 F | SYSTOLIC BLOOD PRESSURE: 116 MMHG

## 2019-11-21 VITALS
WEIGHT: 142 LBS | TEMPERATURE: 99.3 F | RESPIRATION RATE: 18 BRPM | SYSTOLIC BLOOD PRESSURE: 120 MMHG | OXYGEN SATURATION: 99 % | HEIGHT: 68 IN | HEART RATE: 83 BPM | BODY MASS INDEX: 21.52 KG/M2 | DIASTOLIC BLOOD PRESSURE: 68 MMHG

## 2019-11-21 DIAGNOSIS — G43.909 MIGRAINE: ICD-10-CM

## 2019-11-21 DIAGNOSIS — R19.5 ABNORMAL STOOLS: ICD-10-CM

## 2019-11-21 DIAGNOSIS — K31.89 INCREASED STOMACH ACID: ICD-10-CM

## 2019-11-21 DIAGNOSIS — K31.89 INCREASED STOMACH ACID: Primary | ICD-10-CM

## 2019-11-21 DIAGNOSIS — Z78.9 HISTORY OF FOREIGN TRAVEL: ICD-10-CM

## 2019-11-21 DIAGNOSIS — R10.84 GENERALIZED ABDOMINAL PAIN: Primary | ICD-10-CM

## 2019-11-21 DIAGNOSIS — R19.5 ABNORMAL STOOLS: Primary | ICD-10-CM

## 2019-11-21 DIAGNOSIS — A09 DIARRHEA OF INFECTIOUS ORIGIN: ICD-10-CM

## 2019-11-21 LAB
ALBUMIN SERPL BCP-MCNC: 4.8 G/DL (ref 3.5–5.7)
ALP SERPL-CCNC: 66 U/L (ref 40–150)
ALT SERPL W P-5'-P-CCNC: 8 U/L (ref 7–52)
ANION GAP SERPL CALCULATED.3IONS-SCNC: 9 MMOL/L (ref 4–13)
AST SERPL W P-5'-P-CCNC: 14 U/L (ref 13–39)
BASOPHILS # BLD AUTO: 0 THOUSANDS/ΜL (ref 0–0.1)
BASOPHILS NFR BLD AUTO: 0 % (ref 0–2)
BILIRUB SERPL-MCNC: 0.6 MG/DL (ref 0.2–1)
BILIRUB UR QL STRIP: NEGATIVE
BUN SERPL-MCNC: 9 MG/DL (ref 7–25)
CALCIUM SERPL-MCNC: 9.2 MG/DL (ref 8.6–10.5)
CHLORIDE SERPL-SCNC: 101 MMOL/L (ref 98–107)
CLARITY UR: CLEAR
CO2 SERPL-SCNC: 26 MMOL/L (ref 21–31)
COLOR UR: YELLOW
CREAT SERPL-MCNC: 0.85 MG/DL (ref 0.6–1.2)
EOSINOPHIL # BLD AUTO: 0.2 THOUSAND/ΜL (ref 0–0.61)
EOSINOPHIL NFR BLD AUTO: 1 % (ref 0–5)
ERYTHROCYTE [DISTWIDTH] IN BLOOD BY AUTOMATED COUNT: 13.4 % (ref 11.5–14.5)
GFR SERPL CREATININE-BSD FRML MDRD: 88 ML/MIN/1.73SQ M
GLUCOSE SERPL-MCNC: 102 MG/DL (ref 65–99)
GLUCOSE UR STRIP-MCNC: NEGATIVE MG/DL
HCG SERPL QL: NEGATIVE
HCT VFR BLD AUTO: 42.2 % (ref 42–47)
HGB BLD-MCNC: 14.6 G/DL (ref 12–16)
HGB UR QL STRIP.AUTO: NEGATIVE
KETONES UR STRIP-MCNC: NEGATIVE MG/DL
LEUKOCYTE ESTERASE UR QL STRIP: NEGATIVE
LIPASE SERPL-CCNC: 35 U/L (ref 11–82)
LYMPHOCYTES # BLD AUTO: 3 THOUSANDS/ΜL (ref 0.6–4.47)
LYMPHOCYTES NFR BLD AUTO: 22 % (ref 21–51)
MAGNESIUM SERPL-MCNC: 2.1 MG/DL (ref 1.9–2.7)
MCH RBC QN AUTO: 30.9 PG (ref 26–34)
MCHC RBC AUTO-ENTMCNC: 34.5 G/DL (ref 31–37)
MCV RBC AUTO: 90 FL (ref 81–99)
MONOCYTES # BLD AUTO: 1 THOUSAND/ΜL (ref 0.17–1.22)
MONOCYTES NFR BLD AUTO: 7 % (ref 2–12)
NEUTROPHILS # BLD AUTO: 9.4 THOUSANDS/ΜL (ref 1.4–6.5)
NEUTS SEG NFR BLD AUTO: 69 % (ref 42–75)
NITRITE UR QL STRIP: NEGATIVE
PH UR STRIP.AUTO: 6 [PH]
PLATELET # BLD AUTO: 240 THOUSANDS/UL (ref 149–390)
PMV BLD AUTO: 8.1 FL (ref 8.6–11.7)
POTASSIUM SERPL-SCNC: 3.7 MMOL/L (ref 3.5–5.5)
PROT SERPL-MCNC: 7.2 G/DL (ref 6.4–8.9)
PROT UR STRIP-MCNC: NEGATIVE MG/DL
RBC # BLD AUTO: 4.71 MILLION/UL (ref 3.9–5.2)
SODIUM SERPL-SCNC: 136 MMOL/L (ref 134–143)
SP GR UR STRIP.AUTO: <=1.005 (ref 1–1.03)
UROBILINOGEN UR QL STRIP.AUTO: 0.2 E.U./DL
WBC # BLD AUTO: 13.6 THOUSAND/UL (ref 4.8–10.8)

## 2019-11-21 PROCEDURE — 80053 COMPREHEN METABOLIC PANEL: CPT | Performed by: EMERGENCY MEDICINE

## 2019-11-21 PROCEDURE — 83690 ASSAY OF LIPASE: CPT | Performed by: EMERGENCY MEDICINE

## 2019-11-21 PROCEDURE — 96360 HYDRATION IV INFUSION INIT: CPT

## 2019-11-21 PROCEDURE — 99284 EMERGENCY DEPT VISIT MOD MDM: CPT | Performed by: EMERGENCY MEDICINE

## 2019-11-21 PROCEDURE — 36415 COLL VENOUS BLD VENIPUNCTURE: CPT | Performed by: EMERGENCY MEDICINE

## 2019-11-21 PROCEDURE — 85025 COMPLETE CBC W/AUTO DIFF WBC: CPT | Performed by: EMERGENCY MEDICINE

## 2019-11-21 PROCEDURE — 99214 OFFICE O/P EST MOD 30 MIN: CPT | Performed by: NURSE PRACTITIONER

## 2019-11-21 PROCEDURE — 87505 NFCT AGENT DETECTION GI: CPT

## 2019-11-21 PROCEDURE — 81003 URINALYSIS AUTO W/O SCOPE: CPT | Performed by: EMERGENCY MEDICINE

## 2019-11-21 PROCEDURE — 83735 ASSAY OF MAGNESIUM: CPT | Performed by: EMERGENCY MEDICINE

## 2019-11-21 PROCEDURE — 84703 CHORIONIC GONADOTROPIN ASSAY: CPT | Performed by: EMERGENCY MEDICINE

## 2019-11-21 PROCEDURE — 1036F TOBACCO NON-USER: CPT | Performed by: NURSE PRACTITIONER

## 2019-11-21 PROCEDURE — 99284 EMERGENCY DEPT VISIT MOD MDM: CPT

## 2019-11-21 RX ORDER — CIPROFLOXACIN 500 MG/1
500 TABLET, FILM COATED ORAL 2 TIMES DAILY
Qty: 9 TABLET | Refills: 0 | Status: SHIPPED | OUTPATIENT
Start: 2019-11-21 | End: 2019-11-25 | Stop reason: SDUPTHER

## 2019-11-21 RX ORDER — CHOLESTYRAMINE 4 G/9G
1 POWDER, FOR SUSPENSION ORAL DAILY
Qty: 60 PACKET | Refills: 1 | Status: SHIPPED | OUTPATIENT
Start: 2019-11-21 | End: 2020-01-14 | Stop reason: HOSPADM

## 2019-11-21 RX ORDER — CIPROFLOXACIN 500 MG/1
500 TABLET, FILM COATED ORAL ONCE
Status: COMPLETED | OUTPATIENT
Start: 2019-11-21 | End: 2019-11-21

## 2019-11-21 RX ORDER — SUMATRIPTAN 100 MG/1
100 TABLET, FILM COATED ORAL ONCE AS NEEDED
Qty: 9 TABLET | Refills: 1 | Status: SHIPPED | OUTPATIENT
Start: 2019-11-21 | End: 2020-10-26 | Stop reason: SDUPTHER

## 2019-11-21 RX ADMIN — CIPROFLOXACIN HYDROCHLORIDE 500 MG: 500 TABLET, FILM COATED ORAL at 23:47

## 2019-11-21 RX ADMIN — SODIUM CHLORIDE 1000 ML: 0.9 INJECTION, SOLUTION INTRAVENOUS at 21:57

## 2019-11-21 NOTE — PROGRESS NOTES
[address] [facility]        NAME: Azra Nails is a 42 y o  female  : 1980    MRN: [id number]  DATE: 2019  TIME: 10:49 AM    Assessment and Plan   Abnormal stools [R19 5]  1  Abnormal stools  Stool culture    Ova and parasite examination    Ova and parasite examination    Ova and parasite examination   2  History of foreign travel  Stool culture    Ova and parasite examination    Ova and parasite examination    Ova and parasite examination   3  Migraine  SUMAtriptan (IMITREX) 100 mg tablet   4  Increased stomach acid  cholestyramine (QUESTRAN) 4 g packet         Patient Instructions     Patient Instructions   Get stool samples as ordered  Refills given  Call or return for problems/concerns          Chief Complaint     Chief Complaint   Patient presents with    stool     yellow jello like stool smells awful          History of Present Illness       Here for abnormal stools since visiting [facility]  3 days into the trip she started with yellowish jelly like stool along with normal looking loose stool  Some abdominal cramping after eating just before an episode  No fevers, no vomiting  Does report foul odor of stool  Needs refills of Imitrex and Questran      Review of Systems   Review of Systems   Constitutional: Negative for activity change, diaphoresis, fatigue and fever  HENT: Negative for congestion, facial swelling, hearing loss, rhinorrhea, sinus pressure, sinus pain, sneezing, sore throat and voice change  Eyes: Negative for discharge and visual disturbance  Respiratory: Negative for cough, choking, chest tightness, shortness of breath, wheezing and stridor  Cardiovascular: Negative for chest pain, palpitations and leg swelling  Gastrointestinal: Positive for abdominal pain (see hpi, no abd pain currently) and diarrhea (see hpi)  Negative for abdominal distention, constipation, nausea and vomiting  Endocrine: Negative for polydipsia, polyphagia and polyuria  Genitourinary: Negative for difficulty urinating, dysuria, frequency and urgency  Musculoskeletal: Negative for arthralgias, back pain, gait problem, joint swelling, myalgias, neck pain and neck stiffness  Skin: Negative for color change, rash and wound  Neurological: Negative for dizziness, syncope, speech difficulty, weakness, light-headedness and headaches  Hematological: Negative for adenopathy  Does not bruise/bleed easily  Psychiatric/Behavioral: Negative for agitation, behavioral problems, confusion, hallucinations, sleep disturbance and suicidal ideas  The patient is not nervous/anxious            Current Medications       Current Outpatient Medications:     cholestyramine (QUESTRAN) 4 g packet, Take 1 packet (4 g total) by mouth daily, Disp: 60 packet, Rfl: 1    ondansetron (ZOFRAN-ODT) 4 mg disintegrating tablet, Take 1 tablet (4 mg total) by mouth every 6 (six) hours as needed for nausea or vomiting, Disp: 30 tablet, Rfl: 0    SUMAtriptan (IMITREX) 100 mg tablet, Take 1 tablet (100 mg total) by mouth once as needed for migraine for up to 1 dose Up to 2 doses per migraine as needed, Disp: 9 tablet, Rfl: 1    Current Allergies     Allergies as of 11/21/2019 - Reviewed 11/21/2019   Allergen Reaction Noted    Hydrocodone Anaphylaxis 09/27/2018    Morphine Palpitations     Morphine and related Anaphylaxis and Palpitations 02/12/2014    Oxycodone Anaphylaxis 09/27/2018    Penicillins Fever and Other (See Comments) 02/12/2014    Clomid [clomiphene]  09/25/2018    Sulfa antibiotics Fever 08/28/2017            The following portions of the patient's history were reviewed and updated as appropriate: allergies, current medications, past family history, past medical history, past social history, past surgical history and problem list      Past Medical History:   Diagnosis Date    Migraine     Ovarian cyst     Palpitations     Renal disorder     Tachycardia     Uterine fibroid     Uterine leiomyoma     Last assessed 8/28/2017       Past Surgical History:   Procedure Laterality Date    APPENDECTOMY      CERVICAL BIOPSY  W/ LOOP ELECTRODE EXCISION      Last assessed 8/28/2017    COLONOSCOPY      COLPOSCOPY      DENTAL SURGERY      DILATION AND CURETTAGE OF UTERUS      Last assessed 8/28/2017    ESOPHAGOGASTRODUODENOSCOPY      OVARIAN CYST REMOVAL      Last assessed 8/28/2017    TONSILLECTOMY         Family History   Problem Relation Age of Onset    Other Mother         Cardiac Disorder    Heart attack Mother     Arrhythmia Mother     Coronary artery disease Mother     Hyperlipidemia Mother     Aortic aneurysm Father         Abdomincal aortic aneurysm    Hypertension Father     Cerebral aneurysm Family     Heart failure Paternal Grandmother     Stroke Neg Hx     Clotting disorder Neg Hx     Cancer Neg Hx          Medications have been verified  Objective   /68   Pulse 83   Temp 99 3 °F (37 4 °C)   Resp 18   Ht 5' 8" (1 727 m)   Wt 64 4 kg (142 lb)   SpO2 99%   BMI 21 59 kg/m²        Physical Exam     Physical Exam   Constitutional: She is oriented to person, place, and time  Vital signs are normal  She appears well-developed and well-nourished  She is active and cooperative  No distress  Eyes: EOM are normal    Cardiovascular: Normal rate, regular rhythm and normal heart sounds  No murmur heard  Pulmonary/Chest: Effort normal and breath sounds normal  No respiratory distress  She has no wheezes  Neurological: She is alert and oriented to person, place, and time  Skin: Skin is warm and dry  No rash noted  She is not diaphoretic  No erythema  Psychiatric: She has a normal mood and affect  Her behavior is normal  Judgment and thought content normal    Nursing note and vitals reviewed

## 2019-11-22 ENCOUNTER — APPOINTMENT (OUTPATIENT)
Dept: LAB | Facility: CLINIC | Age: 39
End: 2019-11-22
Payer: COMMERCIAL

## 2019-11-22 DIAGNOSIS — K31.89 INCREASED STOMACH ACID: ICD-10-CM

## 2019-11-22 DIAGNOSIS — Z78.9 HISTORY OF FOREIGN TRAVEL: ICD-10-CM

## 2019-11-22 DIAGNOSIS — B37.0 ORAL THRUSH: Primary | ICD-10-CM

## 2019-11-22 DIAGNOSIS — R19.5 ABNORMAL STOOLS: ICD-10-CM

## 2019-11-22 LAB
CAMPYLOBACTER DNA SPEC NAA+PROBE: NORMAL
SALMONELLA DNA SPEC QL NAA+PROBE: NORMAL
SHIGA TOXIN STX GENE SPEC NAA+PROBE: NORMAL
SHIGELLA DNA SPEC QL NAA+PROBE: NORMAL

## 2019-11-22 PROCEDURE — 87209 SMEAR COMPLEX STAIN: CPT

## 2019-11-22 PROCEDURE — 87177 OVA AND PARASITES SMEARS: CPT

## 2019-11-22 PROCEDURE — 87493 C DIFF AMPLIFIED PROBE: CPT

## 2019-11-22 RX ORDER — CLOTRIMAZOLE 10 MG/1
10 LOZENGE ORAL; TOPICAL
Qty: 50 TABLET | Refills: 0 | Status: SHIPPED | OUTPATIENT
Start: 2019-11-22 | End: 2019-12-02

## 2019-11-22 NOTE — DISCHARGE INSTRUCTIONS
RETURN IF WORSE IN ANY WAY:   CHEST PAIN, SHORTNESS OF BREATH, INCREASED PAIN, FEVER OR FLU LIKE SYMPTOMS, OR NEW AND CONCERNING SYMPTOMS SIGNS OR SYMPTOMS:    PLEASE CALL YOUR PRIMARY DOCTOR IN THE MORNING TO SET UP FOLLOW UP   PLEASE REVIEW THE WORK UP RESULTS WITH YOUR DOCTOR

## 2019-11-22 NOTE — ED NOTES
Patient with first liter of fluids infusing - patient does not want another liter of fluid due to having to work tomorrow morning     Shahbaz Calderon RN  11/21/19 [id number]

## 2019-11-22 NOTE — ED NOTES
Patient arrived brining in a stool sample - she reports she did drop off a stool sample after seeing her fmd earlier today - patient was able to provide a urine sample - IV fluids initiated - patient conversing with  in Raulito Simpson RN  11/21/19 [id number]

## 2019-11-22 NOTE — ED PROVIDER NOTES
History  Chief Complaint   Patient presents with    Abdominal Pain     pt states was in [facility] has diarrhea with mucus and some blood and a low grade temp going on day 6 of loose stools; was on clindamycin and keflex     41-YEAR-OLD FEMALE  PMH:  GERD  RX: NOT ON BLOOD THINNERS      PATIENT IS HERE FOR GENERALIZED ABDOMINAL PAIN AND LOOSE STOOLS, NOW PROGRESSED TO DIARRHEA  STOOL LOOSE, NOW BLOODY AND HAS MUCUS IN IT    RECENT TRAVEL:   PT JUST CAME BACK FROM [facility] Sunday, WAS THERE 4 DAYS  SYMPTOMS STARTED ON Saturday  SHE STATES THAT SHE HAD THE WATER THERE  SHE WAS THERE WITH FRIENDS - THEY ALL ATE THE SAME THINGS  THINGS GOT MUCH WORSE ON Monday    RECENT ANTIBIOTICS:   PT WAS ON CLINDAMYCIN FOR ABOUT 7 DAYS ABOUT A MONTH AGO  WAS ON KEFLEX FOR UTI 3 WEEKS AGO    SAW PCP THIS AM  NO ABX STARTED    STATES CAME ON GRADUALLY, NO WORSE  IT HAS BEEN COMING AND GOING, BUT IT HAS BEEN CONSTANT FOR THE LAST COUPLE OF HOURS  IT IS NOW  RATED 5/10, COMES AND GOES, BETTER AFTER POOPING  DESCRIBED AS SHARP    ASSOCIATED SYMPTOMS:    URINARY  SYMPTOMS: THERE IS NO DYSURIA, NO HEMATURIA, NO FREQUENCY  SHE DENIES NAUSEA, VOMITING      DENIES FEVERS, BUT DOES REPORT CHILLS    NO VAGINAL BLEEDING OR DISCHARGE    ALLEVIATING OR EXACERBATING FACTORS:    WORSE W/ FOOD      INTERVENTIONS: NONE        History provided by:  Parent  Abdominal Pain   Pain location:  Generalized  Pain quality: cramping, sharp and stabbing    Pain radiates to:  Does not radiate  Pain severity:  Moderate  Onset quality:  Gradual  Timing:  Constant  Progression:  Worsening  Chronicity:  Recurrent  Context: recent travel    Context: not alcohol use, not awakening from sleep, not diet changes, not eating, not laxative use, not medication withdrawal, not previous surgeries, not recent illness, not retching, not sick contacts, not suspicious food intake and not trauma    Relieved by:  Nothing  Worsened by:  Nothing  Ineffective treatments:  None tried  Associated symptoms: anorexia, chills and diarrhea    Associated symptoms: no chest pain, no constipation, no cough, no dysuria, no fatigue, no fever, no flatus, no hematemesis, no hematochezia, no hematuria, no melena, no nausea, no shortness of breath, no sore throat, no vaginal bleeding, no vaginal discharge and no vomiting    Risk factors: no alcohol abuse, no aspirin use, not elderly, has not had multiple surgeries, not obese, not pregnant and no recent hospitalization        Prior to Admission Medications   Prescriptions Last Dose Informant Patient Reported? Taking?    SUMAtriptan (IMITREX) 100 mg tablet   No No   Sig: Take 1 tablet (100 mg total) by mouth once as needed for migraine for up to 1 dose Up to 2 doses per migraine as needed   cholestyramine (QUESTRAN) 4 g packet   No No   Sig: Take 1 packet (4 g total) by mouth daily      Facility-Administered Medications: None       Past Medical History:   Diagnosis Date    Migraine     Ovarian cyst     Palpitations     Renal disorder     Tachycardia     Uterine fibroid     Uterine leiomyoma     Last assessed 8/28/2017       Past Surgical History:   Procedure Laterality Date    APPENDECTOMY      CERVICAL BIOPSY  W/ LOOP ELECTRODE EXCISION      Last assessed 8/28/2017    COLONOSCOPY      COLPOSCOPY      DENTAL SURGERY      DILATION AND CURETTAGE OF UTERUS      Last assessed 8/28/2017    ESOPHAGOGASTRODUODENOSCOPY      OVARIAN CYST REMOVAL      Last assessed 8/28/2017    TONSILLECTOMY         Family History   Problem Relation Age of Onset    Other Mother         Cardiac Disorder    Heart attack Mother     Arrhythmia Mother     Coronary artery disease Mother     Hyperlipidemia Mother     Aortic aneurysm Father         Abdomincal aortic aneurysm    Hypertension Father     Cerebral aneurysm Family     Heart failure Paternal Grandmother     Stroke Neg Hx     Clotting disorder Neg Hx     Cancer Neg Hx      I have reviewed and agree with the history as documented  Social History     Tobacco Use    Smoking status: Never Smoker    Smokeless tobacco: Never Used   Substance Use Topics    Alcohol use: No    Drug use: No        Review of Systems   Constitutional: Positive for chills  Negative for diaphoresis, fatigue and fever  HENT: Negative for sore throat  Respiratory: Negative for cough, shortness of breath, wheezing and stridor  Cardiovascular: Negative for chest pain, palpitations and leg swelling  Gastrointestinal: Positive for abdominal pain, anorexia and diarrhea  Negative for blood in stool, constipation, flatus, hematemesis, hematochezia, melena, nausea and vomiting  Genitourinary: Negative for difficulty urinating, dysuria, flank pain, frequency, hematuria, vaginal bleeding and vaginal discharge  Musculoskeletal: Negative for arthralgias, back pain, gait problem, joint swelling, myalgias, neck pain and neck stiffness  Skin: Negative for rash and wound  Neurological: Negative for dizziness, light-headedness and headaches  All other systems reviewed and are negative  Physical Exam  Physical Exam   Constitutional: She is oriented to person, place, and time  She appears well-developed and well-nourished  Non-toxic appearance  She does not appear ill  No distress  HENT:   Head: Normocephalic and atraumatic  Nose: Nose normal    Mouth/Throat: Oropharynx is clear and moist  No oropharyngeal exudate  Eyes: Pupils are equal, round, and reactive to light  Conjunctivae and EOM are normal  Right eye exhibits no discharge  Left eye exhibits no discharge  No scleral icterus  Neck: Normal range of motion  Neck supple  No JVD present  No tracheal deviation present  Cardiovascular: Normal rate, regular rhythm, normal heart sounds and intact distal pulses  Exam reveals no gallop and no friction rub  No murmur heard  Pulmonary/Chest: Effort normal and breath sounds normal  No stridor  No respiratory distress   She has no wheezes  She has no rhonchi  She has no rales  She exhibits no tenderness  Abdominal: Soft  Bowel sounds are normal  She exhibits no shifting dullness, no distension, no pulsatile liver, no abdominal bruit, no ascites, no pulsatile midline mass and no mass  There is no hepatosplenomegaly, splenomegaly or hepatomegaly  There is no tenderness  There is no rigidity, no rebound, no guarding, no CVA tenderness, no tenderness at McBurney's point and negative Hill's sign  No hernia  Musculoskeletal: Normal range of motion  She exhibits no edema, tenderness or deformity  Lymphadenopathy:     She has no cervical adenopathy  Neurological: She is alert and oriented to person, place, and time  No cranial nerve deficit or sensory deficit  She exhibits normal muscle tone  Coordination normal    Skin: Skin is warm  Capillary refill takes less than 2 seconds  No rash noted  She is not diaphoretic  No erythema  No pallor  Psychiatric: She has a normal mood and affect   Her behavior is normal  Judgment and thought content normal        Vital Signs  ED Triage Vitals [11/21/19 2010]   Temperature Pulse Respirations Blood Pressure SpO2   98 8 °F (37 1 °C) 76 18 119/60 100 %      Temp Source Heart Rate Source Patient Position - Orthostatic VS BP Location FiO2 (%)   Temporal Monitor Sitting Left arm --      Pain Score       No Pain           Vitals:    11/21/19 2010 11/21/19 2349   BP: 119/60 116/68   Pulse: 76 74   Patient Position - Orthostatic VS: Sitting          Visual Acuity      ED Medications  Medications   sodium chloride 0 9 % bolus 1,000 mL (0 mL Intravenous Stopped 11/21/19 2234)   ciprofloxacin (CIPRO) tablet 500 mg (500 mg Oral Given 11/21/19 2347)       Diagnostic Studies  Results Reviewed     Procedure Component Value Units Date/Time    Magnesium [[id number]]  (Normal) Collected:  11/21/19 2154    Lab Status:  Final result Specimen:  Blood from Arm, Right Updated:  11/21/19 2234     Magnesium 2 1 mg/dL     CMP [[id number]]  (Abnormal) Collected:  11/21/19 2154    Lab Status:  Final result Specimen:  Blood from Arm, Right Updated:  11/21/19 2234     Sodium 136 mmol/L      Potassium 3 7 mmol/L      Chloride 101 mmol/L      CO2 26 mmol/L      ANION GAP 9 mmol/L      BUN 9 mg/dL      Creatinine 0 85 mg/dL      Glucose 102 mg/dL      Calcium 9 2 mg/dL      AST 14 U/L      ALT 8 U/L      Alkaline Phosphatase 66 U/L      Total Protein 7 2 g/dL      Albumin 4 8 g/dL      Total Bilirubin 0 60 mg/dL      eGFR 88 ml/min/1 73sq m     Narrative:       [facility] guidelines for Chronic Kidney Disease (CKD):     Stage 1 with normal or high GFR (GFR > 90 mL/min/1 73 square meters)    Stage 2 Mild CKD (GFR = 60-89 mL/min/1 73 square meters)    Stage 3A Moderate CKD (GFR = 45-59 mL/min/1 73 square meters)    Stage 3B Moderate CKD (GFR = 30-44 mL/min/1 73 square meters)    Stage 4 Severe CKD (GFR = 15-29 mL/min/1 73 square meters)    Stage 5 End Stage CKD (GFR <15 mL/min/1 73 square meters)  Note: GFR calculation is accurate only with a steady state creatinine    hCG, qualitative pregnancy [[id number]]  (Normal) Collected:  11/21/19 2154    Lab Status:  Final result Specimen:  Blood from Arm, Right Updated:  11/21/19 2234     Preg, Serum Negative    Lipase [[id number]]  (Normal) Collected:  11/21/19 2154    Lab Status:  Final result Specimen:  Blood from Arm, Right Updated:  11/21/19 2233     Lipase 35 u/L     CBC and differential [[id number]]  (Abnormal) Collected:  11/21/19 2154    Lab Status:  Final result Specimen:  Blood from Arm, Right Updated:  11/21/19 2212     WBC 13 60 Thousand/uL      RBC 4 71 Million/uL      Hemoglobin 14 6 g/dL      Hematocrit 42 2 %      MCV 90 fL      MCH 30 9 pg      MCHC 34 5 g/dL      RDW 13 4 %      MPV 8 1 fL      Platelets 940 Thousands/uL      Neutrophils Relative 69 %      Lymphocytes Relative 22 %      Monocytes Relative 7 %      Eosinophils Relative 1 %      Basophils Relative 0 %      Neutrophils Absolute 9 40 Thousands/µL      Lymphocytes Absolute 3 00 Thousands/µL      Monocytes Absolute 1 00 Thousand/µL      Eosinophils Absolute 0 20 Thousand/µL      Basophils Absolute 0 00 Thousands/µL     UA w Reflex to Microscopic w Reflex to Culture [[id number]]  (Abnormal) Collected:  11/21/19 2152    Lab Status:  Final result Specimen:  Urine, Clean Catch Updated:  11/21/19 2210     Color, UA Yellow     Clarity, UA Clear     Specific Gravity, UA <=1 005     pH, UA 6 0     Leukocytes, UA Negative     Nitrite, UA Negative     Protein, UA Negative mg/dl      Glucose, UA Negative mg/dl      Ketones, UA Negative mg/dl      Urobilinogen, UA 0 2 E U /dl      Bilirubin, UA Negative     Blood, UA Negative    Ova and parasite examination [[id number]] Collected:  11/21/19 2152    Lab Status:  No result Specimen:  Stool from Rectum     Stool Enteric Bacterial Panel by PCR [[id number]]     Lab Status:  No result Specimen:  Stool     Aeromonas/Pleisiomonas Stool Culture [[id number]]     Lab Status:  No result Specimen:  Stool                  No orders to display              Procedures  Procedures       ED Course  ED Course as of Nov 22 0553   Thu Nov 21, 2019 2122 D/w lab  Pt has grey top, not salmon top      2333 PREGNANCY, SERUM: Negative   2333 Magnesium: 2 1   2333 Lipase: 35   2333 WBC(!): 13 60   2333 Hemoglobin: 14 6   2333 HCT: 42 2   2333 Platelet Count: 589   2333 Neutrophils %: 69   2333 Lymphocytes Relative: 22   2333 Leukocytes, UA: Negative   2333 Nitrite, UA: Negative   2333 Sodium: 136   2333 Potassium: 3 7   2333 Anion Gap: 9   2333 BUN: 9   2333 Creatinine: 0 85   2333 AST: 14   2333 ALT: 8   2333 Alkaline Phosphatase: 66   2333 Pt understands results    She does not want further ivf  Wants to go now  Does not want to wait to give a stool sample  Wants to provide one in the morning  Will follow up with PCP  Wants to go home with empiric antibiotics for traveler's diarrhea    I discussed with the patient the risks and benefits of this and feel this is reasonable   Lipase: 35                               MDM    Disposition  Final diagnoses:   Generalized abdominal pain   Diarrhea of infectious origin     Time reflects when diagnosis was documented in both MDM as applicable and the Disposition within this note     Time User Action Codes Description Comment    11/21/2019 11:35 PM Shukri Melchorsanjuanita Gallardo [R10 [id number]] Generalized abdominal pain     11/21/2019 11:35 PM Shukri Fouzia Gallardo [A09] Diarrhea of infectious origin       ED Disposition     ED Disposition Condition Date/Time Comment    Discharge Stable Thu Nov 21, 2019 11:35 PM Ken Dobbs discharge to home/self care  Follow-up Information     Follow up With Specialties Details Why [address], [address] [facility], Nurse Practitioner, Emergency Medicine Call today  [address] [place] [facility] [address]  [address] [address] [phone]            Discharge Medication List as of 11/21/2019 11:42 PM      START taking these medications    Details   ciprofloxacin (CIPRO) 500 mg tablet Take 1 tablet (500 mg total) by mouth 2 (two) times a day for 9 doses, Starting u 11/21/2019, Until Tue 11/26/2019, Normal         CONTINUE these medications which have NOT CHANGED    Details   cholestyramine (QUESTRAN) 4 g packet Take 1 packet (4 g total) by mouth daily, Starting Thu 11/21/2019, Normal      SUMAtriptan (IMITREX) 100 mg tablet Take 1 tablet (100 mg total) by mouth once as needed for migraine for up to 1 dose Up to 2 doses per migraine as needed, Starting Thu 11/21/2019, Normal           No discharge procedures on file      ED Provider  Electronically Signed by           Virgil Goodman MD  11/22/19 9974

## 2019-11-23 LAB — C DIFF TOX GENS STL QL NAA+PROBE: ABNORMAL

## 2019-11-25 DIAGNOSIS — A09 DIARRHEA OF INFECTIOUS ORIGIN: ICD-10-CM

## 2019-11-25 DIAGNOSIS — A04.72 C. DIFFICILE COLITIS: Primary | ICD-10-CM

## 2019-11-25 RX ORDER — CIPROFLOXACIN 500 MG/1
500 TABLET, FILM COATED ORAL 2 TIMES DAILY
Qty: 10 TABLET | Refills: 0 | Status: SHIPPED | OUTPATIENT
Start: 2019-11-25 | End: 2019-11-30

## 2019-11-27 LAB — O+P STL CONC: NORMAL

## 2019-12-06 ENCOUNTER — APPOINTMENT (OUTPATIENT)
Dept: LAB | Facility: CLINIC | Age: 39
End: 2019-12-06
Payer: COMMERCIAL

## 2019-12-06 DIAGNOSIS — A04.72 C. DIFFICILE COLITIS: ICD-10-CM

## 2019-12-06 DIAGNOSIS — A04.72 C. DIFFICILE COLITIS: Primary | ICD-10-CM

## 2019-12-06 PROCEDURE — 87493 C DIFF AMPLIFIED PROBE: CPT

## 2019-12-06 RX ORDER — CIPROFLOXACIN 500 MG/1
500 TABLET, FILM COATED ORAL EVERY 12 HOURS SCHEDULED
Qty: 20 TABLET | Refills: 0 | Status: SHIPPED | OUTPATIENT
Start: 2019-12-06 | End: 2019-12-16

## 2019-12-06 NOTE — TELEPHONE ENCOUNTER
Patient called and states as soon as she stopped her antibiotic her symptoms continued   Spoke with samuel, will need another round of antibiotics, patient verbalized understanding

## 2019-12-07 LAB — C DIFF TOX GENS STL QL NAA+PROBE: ABNORMAL

## 2019-12-23 DIAGNOSIS — A04.72 C. DIFFICILE COLITIS: Primary | ICD-10-CM

## 2019-12-23 RX ORDER — METRONIDAZOLE 500 MG/1
500 TABLET ORAL EVERY 8 HOURS SCHEDULED
Qty: 42 TABLET | Refills: 0 | Status: SHIPPED | OUTPATIENT
Start: 2019-12-23 | End: 2020-01-06

## 2020-01-10 DIAGNOSIS — B37.9 YEAST INFECTION: Primary | ICD-10-CM

## 2020-01-10 RX ORDER — FLUCONAZOLE 150 MG/1
150 TABLET ORAL ONCE
Qty: 1 TABLET | Refills: 3 | Status: SHIPPED | OUTPATIENT
Start: 2020-01-10 | End: 2020-01-10

## 2020-01-10 RX ORDER — NYSTATIN 100000 U/G
CREAM TOPICAL 2 TIMES DAILY
Qty: 30 G | Refills: 3 | Status: SHIPPED | OUTPATIENT
Start: 2020-01-10 | End: 2020-08-27

## 2020-01-13 ENCOUNTER — HOSPITAL ENCOUNTER (OUTPATIENT)
Facility: HOSPITAL | Age: 40
Setting detail: OBSERVATION
Discharge: HOME | End: 2020-01-14
Attending: EMERGENCY MEDICINE | Admitting: INTERNAL MEDICINE
Payer: COMMERCIAL

## 2020-01-13 DIAGNOSIS — A04.72 C. DIFFICILE DIARRHEA: ICD-10-CM

## 2020-01-13 DIAGNOSIS — E86.0 DEHYDRATION, MILD: ICD-10-CM

## 2020-01-13 DIAGNOSIS — B37.9 YEAST INFECTION: Primary | ICD-10-CM

## 2020-01-13 DIAGNOSIS — A04.72 C. DIFFICILE COLITIS: Primary | ICD-10-CM

## 2020-01-13 PROBLEM — R19.7 DIARRHEA: Status: ACTIVE | Noted: 2020-01-13

## 2020-01-13 PROBLEM — D72.829 LEUKOCYTOSIS: Status: ACTIVE | Noted: 2020-01-13

## 2020-01-13 PROBLEM — E87.6 HYPOKALEMIA: Status: RESOLVED | Noted: 2018-10-12 | Resolved: 2020-01-13

## 2020-01-13 LAB
ALBUMIN SERPL BCP-MCNC: 4.2 G/DL (ref 3.5–5.7)
ALP SERPL-CCNC: 60 U/L (ref 40–150)
ALT SERPL W P-5'-P-CCNC: 9 U/L (ref 7–52)
ANION GAP SERPL CALCULATED.3IONS-SCNC: 8 MMOL/L (ref 4–13)
AST SERPL W P-5'-P-CCNC: 11 U/L (ref 13–39)
BASOPHILS # BLD AUTO: 0 THOUSANDS/ΜL (ref 0–0.1)
BASOPHILS NFR BLD AUTO: 0 % (ref 0–2)
BILIRUB SERPL-MCNC: 0.6 MG/DL (ref 0.2–1)
BUN SERPL-MCNC: 8 MG/DL (ref 7–25)
CALCIUM SERPL-MCNC: 8.7 MG/DL (ref 8.6–10.5)
CHLORIDE SERPL-SCNC: 106 MMOL/L (ref 98–107)
CO2 SERPL-SCNC: 26 MMOL/L (ref 21–31)
CREAT SERPL-MCNC: 0.79 MG/DL (ref 0.6–1.2)
EOSINOPHIL # BLD AUTO: 0.1 THOUSAND/ΜL (ref 0–0.61)
EOSINOPHIL NFR BLD AUTO: 1 % (ref 0–5)
ERYTHROCYTE [DISTWIDTH] IN BLOOD BY AUTOMATED COUNT: 13 % (ref 11.5–14.5)
GFR SERPL CREATININE-BSD FRML MDRD: 96 ML/MIN/1.73SQ M
GLUCOSE SERPL-MCNC: 79 MG/DL (ref 65–99)
HCT VFR BLD AUTO: 41.3 % (ref 42–47)
HGB BLD-MCNC: 13.7 G/DL (ref 12–16)
LYMPHOCYTES # BLD AUTO: 2.2 THOUSANDS/ΜL (ref 0.6–4.47)
LYMPHOCYTES NFR BLD AUTO: 19 % (ref 21–51)
MCH RBC QN AUTO: 29.6 PG (ref 26–34)
MCHC RBC AUTO-ENTMCNC: 33.1 G/DL (ref 31–37)
MCV RBC AUTO: 90 FL (ref 81–99)
MONOCYTES # BLD AUTO: 0.8 THOUSAND/ΜL (ref 0.17–1.22)
MONOCYTES NFR BLD AUTO: 7 % (ref 2–12)
NEUTROPHILS # BLD AUTO: 8.4 THOUSANDS/ΜL (ref 1.4–6.5)
NEUTS SEG NFR BLD AUTO: 73 % (ref 42–75)
PLATELET # BLD AUTO: 214 THOUSANDS/UL (ref 149–390)
PMV BLD AUTO: 7.7 FL (ref 8.6–11.7)
POTASSIUM SERPL-SCNC: 3.8 MMOL/L (ref 3.5–5.5)
PROT SERPL-MCNC: 6.6 G/DL (ref 6.4–8.9)
RBC # BLD AUTO: 4.61 MILLION/UL (ref 3.9–5.2)
SODIUM SERPL-SCNC: 140 MMOL/L (ref 134–143)
WBC # BLD AUTO: 11.6 THOUSAND/UL (ref 4.8–10.8)

## 2020-01-13 PROCEDURE — 87493 C DIFF AMPLIFIED PROBE: CPT | Performed by: PHYSICIAN ASSISTANT

## 2020-01-13 PROCEDURE — 99285 EMERGENCY DEPT VISIT HI MDM: CPT

## 2020-01-13 PROCEDURE — 96360 HYDRATION IV INFUSION INIT: CPT

## 2020-01-13 PROCEDURE — 99220 PR INITIAL OBSERVATION CARE/DAY 70 MINUTES: CPT | Performed by: PHYSICIAN ASSISTANT

## 2020-01-13 PROCEDURE — 36415 COLL VENOUS BLD VENIPUNCTURE: CPT | Performed by: PHYSICIAN ASSISTANT

## 2020-01-13 PROCEDURE — 80053 COMPREHEN METABOLIC PANEL: CPT | Performed by: PHYSICIAN ASSISTANT

## 2020-01-13 PROCEDURE — 99285 EMERGENCY DEPT VISIT HI MDM: CPT | Performed by: PHYSICIAN ASSISTANT

## 2020-01-13 PROCEDURE — 85025 COMPLETE CBC W/AUTO DIFF WBC: CPT | Performed by: PHYSICIAN ASSISTANT

## 2020-01-13 PROCEDURE — 96361 HYDRATE IV INFUSION ADD-ON: CPT

## 2020-01-13 RX ORDER — NYSTATIN 100000 U/G
CREAM TOPICAL 2 TIMES DAILY
Status: DISCONTINUED | OUTPATIENT
Start: 2020-01-13 | End: 2020-01-14 | Stop reason: HOSPADM

## 2020-01-13 RX ORDER — SODIUM CHLORIDE 9 MG/ML
125 INJECTION, SOLUTION INTRAVENOUS CONTINUOUS
Status: DISCONTINUED | OUTPATIENT
Start: 2020-01-13 | End: 2020-01-14 | Stop reason: HOSPADM

## 2020-01-13 RX ORDER — SUMATRIPTAN 25 MG/1
100 TABLET, FILM COATED ORAL ONCE AS NEEDED
Status: DISCONTINUED | OUTPATIENT
Start: 2020-01-13 | End: 2020-01-14 | Stop reason: HOSPADM

## 2020-01-13 RX ORDER — ACETAMINOPHEN 325 MG/1
650 TABLET ORAL EVERY 6 HOURS PRN
Status: DISCONTINUED | OUTPATIENT
Start: 2020-01-13 | End: 2020-01-14 | Stop reason: HOSPADM

## 2020-01-13 RX ORDER — FLUCONAZOLE 150 MG/1
150 TABLET ORAL DAILY
Qty: 7 TABLET | Refills: 1 | Status: SHIPPED | OUTPATIENT
Start: 2020-01-13 | End: 2020-01-14 | Stop reason: HOSPADM

## 2020-01-13 RX ORDER — ONDANSETRON 2 MG/ML
4 INJECTION INTRAMUSCULAR; INTRAVENOUS EVERY 6 HOURS PRN
Status: DISCONTINUED | OUTPATIENT
Start: 2020-01-13 | End: 2020-01-14 | Stop reason: HOSPADM

## 2020-01-13 RX ADMIN — SODIUM CHLORIDE 125 ML/HR: 9 INJECTION, SOLUTION INTRAVENOUS at 20:00

## 2020-01-13 RX ADMIN — NYSTATIN: 100000 CREAM TOPICAL at 21:11

## 2020-01-13 RX ADMIN — VANCOMYCIN HYDROCHLORIDE 125 MG: 500 INJECTION, POWDER, LYOPHILIZED, FOR SOLUTION INTRAVENOUS at 21:11

## 2020-01-13 RX ADMIN — SODIUM CHLORIDE 1000 ML: 0.9 INJECTION, SOLUTION INTRAVENOUS at 16:27

## 2020-01-13 NOTE — ED PROVIDER NOTES
History  Chief Complaint   Patient presents with    Diarrhea     history of Cdiff     Patient is a 41 y/o female with PMH significant for c diff infection and acute kidney failure who presents to the emergency department from GI office visit for complaint of diarrhea beginning 3 days ago  Patient was first diagnosed with C diff infection in November 2019 after completing a Keflex and Clindamycin regimen back to back, was treated with Cipro x 2 regimens, saw GI who placed the patient on oral vanco regimen  Patient states that she did not notice improvement in symptoms until the end of vanco regimen  States that 3 days after stopping the medicine, she began experiencing diffuse episodes of watery diarrhea fluctuating with pustulant diarrhea, accompanied by intense abdominal cramping with any PO intake  Patient states she has been unable to keep anything down PO  She denies vomiting  She has a history of hemorrhoids and is currently menstruating therefore says it is difficult to discern hematochezia, denies melena  I spoke to the patient's PA-C from GI who recommended overnight observation of the patient due to low vital signs in office  Patient denies urinary symptoms  She has not used further ABX  She has not had exposure to other c diff infection recently  Patient states that when she was last in the ER, she received six shots of Toradol and contrast dye for imaging which sent her into acute renal failure  Prior to Admission Medications   Prescriptions Last Dose Informant Patient Reported? Taking?    SUMAtriptan (IMITREX) 100 mg tablet   No No   Sig: Take 1 tablet (100 mg total) by mouth once as needed for migraine for up to 1 dose Up to 2 doses per migraine as needed   cholestyramine (QUESTRAN) 4 g packet   No No   Sig: Take 1 packet (4 g total) by mouth daily   fluconazole (DIFLUCAN) 150 mg tablet   No No   Sig: Take 1 tablet (150 mg total) by mouth daily for 7 doses   nystatin (MYCOSTATIN) cream   No No   Sig: Apply topically 2 (two) times a day      Facility-Administered Medications: None       Past Medical History:   Diagnosis Date    Migraine     Ovarian cyst     Palpitations     Renal disorder     Tachycardia     Uterine fibroid     Uterine leiomyoma     Last assessed 8/28/2017       Past Surgical History:   Procedure Laterality Date    APPENDECTOMY      CERVICAL BIOPSY  W/ LOOP ELECTRODE EXCISION      Last assessed 8/28/2017    COLONOSCOPY      COLPOSCOPY      DENTAL SURGERY      DILATION AND CURETTAGE OF UTERUS      Last assessed 8/28/2017    ESOPHAGOGASTRODUODENOSCOPY      OVARIAN CYST REMOVAL      Last assessed 8/28/2017    TONSILLECTOMY         Family History   Problem Relation Age of Onset    Other Mother         Cardiac Disorder    Heart attack Mother     Arrhythmia Mother     Coronary artery disease Mother     Hyperlipidemia Mother     Aortic aneurysm Father         Abdomincal aortic aneurysm    Hypertension Father     Cerebral aneurysm Family     Heart failure Paternal Grandmother     Stroke Neg Hx     Clotting disorder Neg Hx     Cancer Neg Hx      I have reviewed and agree with the history as documented  Social History     Tobacco Use    Smoking status: Never Smoker    Smokeless tobacco: Never Used   Substance Use Topics    Alcohol use: No    Drug use: No        Review of Systems   Constitutional: Positive for fatigue  Negative for appetite change, chills, fever and unexpected weight change  Respiratory: Negative for cough, choking, chest tightness, shortness of breath, wheezing and stridor  Cardiovascular: Negative for chest pain, palpitations and leg swelling  Gastrointestinal: Positive for abdominal pain and diarrhea  Negative for abdominal distention, blood in stool, constipation, nausea and vomiting     Genitourinary: Negative for decreased urine volume, difficulty urinating, dysuria, flank pain, frequency, hematuria, pelvic pain, urgency, vaginal bleeding and vaginal discharge  Musculoskeletal: Negative for arthralgias, back pain, gait problem, joint swelling, myalgias, neck pain and neck stiffness  Skin: Negative for color change, pallor, rash and wound  Allergic/Immunologic: Negative for food allergies and immunocompromised state  Neurological: Positive for weakness  Negative for dizziness, tremors, seizures, syncope, light-headedness, numbness and headaches  Hematological: Negative for adenopathy  All other systems reviewed and are negative  Physical Exam  Physical Exam   Constitutional: She is oriented to person, place, and time  She appears well-developed and well-nourished  She is cooperative  Non-toxic appearance  No distress  HENT:   Head: Normocephalic and atraumatic  Mouth/Throat: Oropharynx is clear and moist    Eyes: Pupils are equal, round, and reactive to light  Conjunctivae and EOM are normal    Neck: Normal range of motion  Neck supple  Cardiovascular: Normal rate, regular rhythm, S1 normal, S2 normal, normal heart sounds and intact distal pulses  Exam reveals no decreased pulses  No murmur heard  Pulmonary/Chest: Effort normal and breath sounds normal  No tachypnea  No respiratory distress  She has no decreased breath sounds  Abdominal: Soft  Normal appearance, normal aorta and bowel sounds are normal  She exhibits no distension, no ascites, no pulsatile midline mass and no mass  There is no hepatosplenomegaly  There is generalized tenderness  There is no rigidity, no rebound, no guarding and no CVA tenderness  Neurological: She is alert and oriented to person, place, and time  She has normal strength  GCS eye subscore is 4  GCS verbal subscore is 5  GCS motor subscore is 6  Skin: Skin is warm, dry and intact  Capillary refill takes less than 2 seconds  No abrasion, no bruising, no lesion, no petechiae and no rash noted  No erythema  Vitals reviewed        Vital Signs  ED Triage Vitals [01/13/20 1524] Temperature Pulse Respirations Blood Pressure SpO2   97 6 °F (36 4 °C) 82 18 119/80 98 %      Temp Source Heart Rate Source Patient Position - Orthostatic VS BP Location FiO2 (%)   Temporal Monitor Sitting Left arm --      Pain Score       No Pain           Vitals:    01/13/20 1524 01/13/20 1715   BP: 119/80 114/74   Pulse: 82 68   Patient Position - Orthostatic VS: Sitting Lying         Visual Acuity      ED Medications  Medications   sodium chloride 0 9 % bolus 1,000 mL (1,000 mL Intravenous New Bag 1/13/20 1627)       Diagnostic Studies  Results Reviewed     Procedure Component Value Units Date/Time    Comprehensive metabolic panel [[id number]]  (Abnormal) Collected:  01/13/20 1816    Lab Status:  Final result Specimen:  Blood from Arm, Right Updated:  01/13/20 1842     Sodium 140 mmol/L      Potassium 3 8 mmol/L      Chloride 106 mmol/L      CO2 26 mmol/L      ANION GAP 8 mmol/L      BUN 8 mg/dL      Creatinine 0 79 mg/dL      Glucose 79 mg/dL      Calcium 8 7 mg/dL      AST 11 U/L      ALT 9 U/L      Alkaline Phosphatase 60 U/L      Total Protein 6 6 g/dL      Albumin 4 2 g/dL      Total Bilirubin 0 60 mg/dL      eGFR 96 ml/min/1 73sq m     Narrative:       [place] guidelines for Chronic Kidney Disease (CKD):     Stage 1 with normal or high GFR (GFR > 90 mL/min/1 73 square meters)    Stage 2 Mild CKD (GFR = 60-89 mL/min/1 73 square meters)    Stage 3A Moderate CKD (GFR = 45-59 mL/min/1 73 square meters)    Stage 3B Moderate CKD (GFR = 30-44 mL/min/1 73 square meters)    Stage 4 Severe CKD (GFR = 15-29 mL/min/1 73 square meters)    Stage 5 End Stage CKD (GFR <15 mL/min/1 73 square meters)  Note: GFR calculation is accurate only with a steady state creatinine    CBC and differential [[id number]]  (Abnormal) Collected:  01/13/20 1816    Lab Status:  Final result Specimen:  Blood from Arm, Right Updated:  01/13/20 1823     WBC 11 60 Thousand/uL      RBC 4 61 Million/uL Hemoglobin 13 7 g/dL      Hematocrit 41 3 %      MCV 90 fL      MCH 29 6 pg      MCHC 33 1 g/dL      RDW 13 0 %      MPV 7 7 fL      Platelets 923 Thousands/uL      Neutrophils Relative 73 %      Lymphocytes Relative 19 %      Monocytes Relative 7 %      Eosinophils Relative 1 %      Basophils Relative 0 %      Neutrophils Absolute 8 40 Thousands/µL      Lymphocytes Absolute 2 20 Thousands/µL      Monocytes Absolute 0 80 Thousand/µL      Eosinophils Absolute 0 10 Thousand/µL      Basophils Absolute 0 00 Thousands/µL     Clostridium difficile toxin by PCR with EIA [[id number]]     Lab Status:  No result Specimen:  Stool                  No orders to display              Procedures  Procedures         ED Course  ED Course as of Jan 13 1900 Mon Jan 13, 2020   1651 Blood attempted to be taken but unsuccessful due to dehydration  Nurse to retry       [id number] Fluids running  Nurse to retry to get blood after fluids done  1856 Case discussed with Geronimo Sanchez, patient accepted for admission  Patient amendable to plan  MDM  Number of Diagnoses or Management Options  C  difficile colitis:   Diagnosis management comments: 43 y/o female with past medical history significant for C diff infection and acute kidney failure who presents for complaint of diffuse watery diarrhea with intermittent purulent diarrhea x2 days  On exam, well-appearing female, no acute distress, nontoxic appearance, afebrile, VSS, AAOx3, generalized abdominal TTP without distention, ascites, or peritoneal signs, lung and heart sounds normal, no bodily rash, exam otherwise unremarkable  Consider first recurrence of c diff infection, due to similar symptoms as previous and recent hx of infection  Will obtain: CBC, CMP, c diff PCR  Will hold imaging at this time, as no peritoneal signs     Will start fluids to hydrate          Amount and/or Complexity of Data Reviewed  Clinical lab tests: ordered and reviewed  Discussion of test results with the performing providers: yes  Decide to obtain previous medical records or to obtain history from someone other than the patient: yes  Obtain history from someone other than the patient: yes  Review and summarize past medical records: yes  Discuss the patient with other providers: yes    Risk of Complications, Morbidity, and/or Mortality  General comments: White count mildly elevated  Labs otherwise unremarkable  See ED course note for dispo and plan  I reviewed and discussed lab findings with the patient at bedside  I answered any and all questions the patient had regarding emergency department course of evaluation and treatment  The patient verbalized understanding of and agreement with plan  Patient Progress  Patient progress: stable        Disposition  Final diagnoses:   C  difficile colitis     Time reflects when diagnosis was documented in both MDM as applicable and the Disposition within this note     Time User Action Codes Description Comment    1/13/2020  6:58 PM Meg Liz Add [A04 72] C  difficile colitis       ED Disposition     ED Disposition Condition Date/Time Comment    Admit Stable Mon Jan 13, 2020  6:58 PM Case was discussed with Stacy Green and the patient's admission status was agreed to be Admission Status: observation status to the service of Dr Maris Benson   Follow-up Information    None         Patient's Medications   Discharge Prescriptions    No medications on file     No discharge procedures on file      ED Provider  Electronically Signed by           Ace Palacios PA-C  01/13/20 2694

## 2020-01-13 NOTE — ED NOTES
IV access successful, unable to aspirate blood for labs, fluid bolus infusing   APC advised     Radha Mejias, RN  01/13/20 0862

## 2020-01-14 VITALS
BODY MASS INDEX: 23.22 KG/M2 | DIASTOLIC BLOOD PRESSURE: 56 MMHG | OXYGEN SATURATION: 97 % | TEMPERATURE: 96.5 F | HEIGHT: 68 IN | HEART RATE: 75 BPM | RESPIRATION RATE: 18 BRPM | WEIGHT: 153.22 LBS | SYSTOLIC BLOOD PRESSURE: 102 MMHG

## 2020-01-14 PROBLEM — A04.72 C. DIFFICILE DIARRHEA: Status: RESOLVED | Noted: 2020-01-13 | Resolved: 2020-01-14

## 2020-01-14 PROBLEM — D72.829 LEUKOCYTOSIS: Status: RESOLVED | Noted: 2020-01-13 | Resolved: 2020-01-14

## 2020-01-14 LAB
ANION GAP SERPL CALCULATED.3IONS-SCNC: 9 MMOL/L (ref 4–13)
BASOPHILS # BLD AUTO: 0 THOUSANDS/ΜL (ref 0–0.1)
BASOPHILS NFR BLD AUTO: 0 % (ref 0–2)
BUN SERPL-MCNC: 8 MG/DL (ref 7–25)
C DIFF TOX A+B STL QL IA: POSITIVE
C DIFF TOX GENS STL QL NAA+PROBE: POSITIVE
CALCIUM SERPL-MCNC: 8.3 MG/DL (ref 8.6–10.5)
CHLORIDE SERPL-SCNC: 107 MMOL/L (ref 98–107)
CO2 SERPL-SCNC: 23 MMOL/L (ref 21–31)
CREAT SERPL-MCNC: 0.71 MG/DL (ref 0.6–1.2)
EOSINOPHIL # BLD AUTO: 0.1 THOUSAND/ΜL (ref 0–0.61)
EOSINOPHIL NFR BLD AUTO: 1 % (ref 0–5)
ERYTHROCYTE [DISTWIDTH] IN BLOOD BY AUTOMATED COUNT: 12.7 % (ref 11.5–14.5)
GFR SERPL CREATININE-BSD FRML MDRD: 109 ML/MIN/1.73SQ M
GLUCOSE SERPL-MCNC: 75 MG/DL (ref 65–99)
HCT VFR BLD AUTO: 38.4 % (ref 42–47)
HGB BLD-MCNC: 12.7 G/DL (ref 12–16)
LYMPHOCYTES # BLD AUTO: 2.1 THOUSANDS/ΜL (ref 0.6–4.47)
LYMPHOCYTES NFR BLD AUTO: 21 % (ref 21–51)
MCH RBC QN AUTO: 29.5 PG (ref 26–34)
MCHC RBC AUTO-ENTMCNC: 33 G/DL (ref 31–37)
MCV RBC AUTO: 90 FL (ref 81–99)
MONOCYTES # BLD AUTO: 0.9 THOUSAND/ΜL (ref 0.17–1.22)
MONOCYTES NFR BLD AUTO: 9 % (ref 2–12)
NEUTROPHILS # BLD AUTO: 7 THOUSANDS/ΜL (ref 1.4–6.5)
NEUTS SEG NFR BLD AUTO: 69 % (ref 42–75)
PLATELET # BLD AUTO: 204 THOUSANDS/UL (ref 149–390)
PMV BLD AUTO: 8.2 FL (ref 8.6–11.7)
POTASSIUM SERPL-SCNC: 3.6 MMOL/L (ref 3.5–5.5)
RBC # BLD AUTO: 4.29 MILLION/UL (ref 3.9–5.2)
SODIUM SERPL-SCNC: 139 MMOL/L (ref 134–143)
WBC # BLD AUTO: 10.1 THOUSAND/UL (ref 4.8–10.8)

## 2020-01-14 PROCEDURE — 85025 COMPLETE CBC W/AUTO DIFF WBC: CPT | Performed by: NURSE PRACTITIONER

## 2020-01-14 PROCEDURE — 80048 BASIC METABOLIC PNL TOTAL CA: CPT | Performed by: NURSE PRACTITIONER

## 2020-01-14 PROCEDURE — 99217 PR OBSERVATION CARE DISCHARGE MANAGEMENT: CPT | Performed by: INTERNAL MEDICINE

## 2020-01-14 RX ORDER — SACCHAROMYCES BOULARDII 250 MG
250 CAPSULE ORAL 2 TIMES DAILY
Qty: 60 CAPSULE | Refills: 0 | Status: SHIPPED | OUTPATIENT
Start: 2020-01-14 | End: 2021-03-23

## 2020-01-14 RX ORDER — VANCOMYCIN HYDROCHLORIDE 125 MG/1
125 CAPSULE ORAL 4 TIMES DAILY
Qty: 90 CAPSULE | Refills: 0 | Status: SHIPPED | OUTPATIENT
Start: 2020-01-14 | End: 2020-02-06

## 2020-01-14 RX ADMIN — NYSTATIN: 100000 CREAM TOPICAL at 08:08

## 2020-01-14 RX ADMIN — VANCOMYCIN HYDROCHLORIDE 125 MG: 500 INJECTION, POWDER, LYOPHILIZED, FOR SOLUTION INTRAVENOUS at 08:03

## 2020-01-14 RX ADMIN — SUMATRIPTAN SUCCINATE 100 MG: 25 TABLET ORAL at 03:42

## 2020-01-14 RX ADMIN — SODIUM CHLORIDE 125 ML/HR: 9 INJECTION, SOLUTION INTRAVENOUS at 08:08

## 2020-01-14 RX ADMIN — VANCOMYCIN HYDROCHLORIDE 125 MG: 500 INJECTION, POWDER, LYOPHILIZED, FOR SOLUTION INTRAVENOUS at 02:57

## 2020-01-14 NOTE — PLAN OF CARE
Problem: Nutrition/Hydration-ADULT  Goal: Nutrient/Hydration intake appropriate for improving, restoring or maintaining nutritional needs  Description  Monitor and assess patient's nutrition/hydration status for malnutrition  Collaborate with interdisciplinary team and initiate plan and interventions as ordered  Monitor patient's weight and dietary intake as ordered or per policy  Utilize nutrition screening tool and intervene as necessary  Determine patient's food preferences and provide high-protein, high-caloric foods as appropriate       INTERVENTIONS:  - Monitor oral intake, urinary output, labs, and treatment plans  - Assess nutrition and hydration status and recommend course of action  - Evaluate amount of meals eaten  - Assist patient with eating if necessary   - Allow adequate time for meals  - Recommend/ encourage appropriate diets, oral nutritional supplements, and vitamin/mineral supplements  - Order, calculate, and assess calorie counts as needed  - Recommend, monitor, and adjust tube feedings and TPN/PPN based on assessed needs  - Assess need for intravenous fluids  - Provide specific nutrition/hydration education as appropriate  - Include patient/family/caregiver in decisions related to nutrition  Outcome: Progressing     Problem: PAIN - ADULT  Goal: Verbalizes/displays adequate comfort level or baseline comfort level  Description  Interventions:  - Encourage patient to monitor pain and request assistance  - Assess pain using appropriate pain scale  - Administer analgesics based on type and severity of pain and evaluate response  - Implement non-pharmacological measures as appropriate and evaluate response  - Consider cultural and social influences on pain and pain management  - Notify physician/advanced practitioner if interventions unsuccessful or patient reports new pain  Outcome: Progressing     Problem: INFECTION - ADULT  Goal: Absence or prevention of progression during hospitalization  Description  INTERVENTIONS:  - Assess and monitor for signs and symptoms of infection  - Monitor lab/diagnostic results  - Monitor all insertion sites, i e  indwelling lines, tubes, and drains  - Monitor endotracheal if appropriate and nasal secretions for changes in amount and color  - [place] appropriate cooling/warming therapies per order  - Administer medications as ordered  - Instruct and encourage patient and family to use good hand hygiene technique  - Identify and instruct in appropriate isolation precautions for identified infection/condition  Outcome: Progressing  Goal: Absence of fever/infection during neutropenic period  Description  INTERVENTIONS:  - Monitor WBC    Outcome: Progressing     Problem: SAFETY ADULT  Goal: Patient will remain free of falls  Description  INTERVENTIONS:  - Assess patient frequently for physical needs  -  Identify cognitive and physical deficits and behaviors that affect risk of falls    -  [place] fall precautions as indicated by assessment   - Educate patient/family on patient safety including physical limitations  - Instruct patient to call for assistance with activity based on assessment  - Modify environment to reduce risk of injury  - Consider OT/PT consult to assist with strengthening/mobility  Outcome: Progressing  Goal: Maintain or return to baseline ADL function  Description  INTERVENTIONS:  -  Assess patient's ability to carry out ADLs; assess patient's baseline for ADL function and identify physical deficits which impact ability to perform ADLs (bathing, care of mouth/teeth, toileting, grooming, dressing, etc )  - Assess/evaluate cause of self-care deficits   - Assess range of motion  - Assess patient's mobility; develop plan if impaired  - Assess patient's need for assistive devices and provide as appropriate  - Encourage maximum independence but intervene and supervise when necessary  - Involve family in performance of ADLs  - Assess for home care needs following discharge   - Consider OT consult to assist with ADL evaluation and planning for discharge  - Provide patient education as appropriate  Outcome: Progressing  Goal: Maintain or return mobility status to optimal level  Description  INTERVENTIONS:  - Assess patient's baseline mobility status (ambulation, transfers, stairs, etc )    - Identify cognitive and physical deficits and behaviors that affect mobility  - Identify mobility aids required to assist with transfers and/or ambulation (gait belt, sit-to-stand, lift, walker, cane, etc )  - [place] fall precautions as indicated by assessment  - Record patient progress and toleration of activity level on Mobility SBAR; progress patient to next Phase/Stage  - Instruct patient to call for assistance with activity based on assessment  - Consider rehabilitation consult to assist with strengthening/weightbearing, etc   Outcome: Progressing

## 2020-01-14 NOTE — DISCHARGE SUMMARY
Discharge- Tamara Atkins 1980, 42 y o  female MRN: [id number]    Unit/Bed#: -01 Encounter: [id number]    Primary Care Provider: ERMA Lang   Date and time admitted to hospital: 1/13/2020  3:22 PM        * C  difficile diarrhea-resolved as of 1/14/2020  Assessment & Plan  · This is patient's 1st recurrence  · Symptoms improving with oral vancomycin  · Will treat patient with taper dose of oral vancomycin as per guidelines  · Follow-up with GI as an outpatient        Discharging Physician / Practitioner: Angel Higgins MD  PCP: Edgard Lang  Admission Date:   Admission Orders (From admission, onward)     Ordered        01/13/20 1858  Place in Observation  Once                   Discharge Date: 01/14/20    Resolved Problems  Date Reviewed: 1/13/2020          Resolved    * (Principal) C  difficile diarrhea 1/14/2020     Resolved by  Angel Higgins MD    Leukocytosis 1/14/2020     Resolved by  Angel Higgins MD          Consultations During Hospital Stay:  · Gi    Procedures Performed:   · n/a    Significant Findings / Test Results:   · CDiff +    Incidental Findings:   · n/a     Test Results Pending at Discharge (will require follow up):   · n/a     Outpatient Tests Requested:  · n/a    Complications:     n/a    Reason for Admission:  Diarrhea    Hospital Course:     Tamara Atkins is a 42 y o  female patient who originally presented to the hospital on 1/13/2020 due to diarrhea due to C diff  Patient was originally treated with oral vancomycin for 10 days with subsequent improvement in her symptoms, and now has a recurrence  She was started on oral vancomycin with improvement in her symptoms and will be discharged on a tapering course and we following up with her primary care physician and gastroenterologist as an outpatient  Please see above list of diagnoses and related plan for additional information       Condition at Discharge: fair     Discharge Day Visit / Exam:     Subjective: Patient seen examined  No acute events overnight  Still having diarrhea  Vitals: Blood Pressure: 102/56 (01/14/20 0800)  Pulse: 75 (01/14/20 0800)  Temperature: (!) 96 5 °F (35 8 °C) (01/14/20 0800)  Temp Source: Temporal (01/14/20 0800)  Respirations: 18 (01/14/20 0800)  Height: 5' 8" (172 7 cm) (01/13/20 1954)  Weight - Scale: 69 5 kg (153 lb 3 5 oz) (01/13/20 1954)  SpO2: 97 % (01/14/20 0800)  Exam:   Physical Exam   Constitutional: She is oriented to person, place, and time  She appears well-developed and well-nourished  HENT:   Head: Normocephalic and atraumatic  Eyes: Pupils are equal, round, and reactive to light  EOM are normal    Neck: Normal range of motion  Neck supple  Cardiovascular: Normal rate and regular rhythm  Pulmonary/Chest: Effort normal and breath sounds normal    Abdominal: Soft  Bowel sounds are normal  She exhibits no distension  Musculoskeletal: Normal range of motion  She exhibits no edema  Neurological: She is alert and oriented to person, place, and time  Skin: Skin is warm  Capillary refill takes less than 2 seconds  Psychiatric: She has a normal mood and affect  Her behavior is normal  Thought content normal    Nursing note and vitals reviewed  Discussion with Family: n/a    Discharge instructions/Information to patient and family:   See after visit summary for information provided to patient and family  Provisions for Follow-Up Care:  See after visit summary for information related to follow-up care and any pertinent home health orders  Disposition:     Home    For Discharges to [facility]:   · Not Applicable to this Patient - Not Applicable to this Patient    Planned Readmission:    n/a     Discharge Statement:  I spent 36 minutes discharging the patient  This time was spent on the day of discharge  I had direct contact with the patient on the day of discharge   Greater than 50% of the total time was spent examining patient, answering all patient questions, arranging and discussing plan of care with patient as well as directly providing post-discharge instructions  Additional time then spent on discharge activities  Discharge Medications:  See after visit summary for reconciled discharge medications provided to patient and family        ** Please Note: This note has been constructed using a voice recognition system **

## 2020-01-14 NOTE — ASSESSMENT & PLAN NOTE
· This is patient's 1st recurrence  · Symptoms improving with oral vancomycin  · Will treat patient with taper dose of oral vancomycin as per guidelines  · Follow-up with GI as an outpatient

## 2020-01-14 NOTE — PLAN OF CARE
Problem: DISCHARGE PLANNING - CARE MANAGEMENT  Goal: Discharge to post-acute care or home with appropriate resources  Description  INTERVENTIONS:  - Conduct assessment to determine patient/family and health care team treatment goals, and need for post-acute services based on payer coverage, community resources, and patient preferences, and barriers to discharge  - Address psychosocial, clinical, and financial barriers to discharge as identified in assessment in conjunction with the patient/family and health care team  - Arrange appropriate level of post-acute services according to patient's   needs and preference and payer coverage in collaboration with the physician and health care team  - Communicate with and update the patient/family, physician, and health care team regarding progress on the discharge plan  - Arrange appropriate transportation to post-acute venues  Discharge home with SO who will transport     Outcome: Progressing

## 2020-01-14 NOTE — UTILIZATION REVIEW
Initial Clinical Review    Admission: Date/Time/Statement:    Admission Orders (From admission, onward)     Ordered        01/13/20 1858  Place in Observation  Once                   Orders Placed This Encounter   Procedures    Place in Observation     Standing Status:   Standing     Number of Occurrences:   1     Order Specific Question:   Admitting Physician     Answer:   Myriam Richardson [[id number]]     Order Specific Question:   Level of Care     Answer:   Med Surg [[id number]]     ED Arrival Information     Expected Arrival Acuity Means of Arrival Escorted By Service Admission Type    - 1/13/2020 14:57 Urgent Walk-In Self General Medicine Urgent    Arrival Complaint    abdominal pain        Chief Complaint   Patient presents with    Diarrhea     history of Cdiff     Assessment/Plan:   41 y/o female with PMH significant for c diff infection and acute kidney failure who presents to the emergency department from GI office visit for complaint of diarrhea beginning 3 days ago  Patient was first diagnosed with C diff infection in November 2019 after completing a Keflex and Clindamycin regimen back to back, was treated with Cipro x 2 regimens, saw GI who placed the patient on oral vanco regimen  Patient states that she did not notice improvement in symptoms until the end of vanco regimen  States that 3 days after stopping the medicine, she began experiencing diffuse episodes of watery diarrhea fluctuating with pustulant diarrhea, accompanied by intense abdominal cramping with any PO intake  Patient states she has been unable to keep anything down PO  She denies vomiting  She has a history of hemorrhoids and is currently menstruating therefore says it is difficult to discern hematochezia, denies melena  I spoke to the patient's PA-C from GI who recommended overnight observation of the patient due to low vital signs in office  Patient denies urinary symptoms  She has not used further ABX   She has not had exposure to other c diff infection recently  Patient states that when she was last in the ER, she received six shots of Toradol and contrast dye for imaging which sent her into acute renal failure  C  difficile diarrhea  Assessment & Plan  · History C diff x2 after antibiotics with Keflex and clindamycin  · Patient was recently on a vancomycin regimen and by day 8/10 stools improved  · She reports after 10 days of stopping the medicine she has had diffuse watery diarrhea  · Follow-up stool testing  · Vancomycin 125 mg q i d  · Patient was seen by GI on Monday in the office and was directed to come to the emergency room, they are planning to evaluate the patient in the a m   And arranging for Dificid  · Consider ID consult also    ED Triage Vitals [01/13/20 1524]   Temperature Pulse Respirations Blood Pressure SpO2   97 6 °F (36 4 °C) 82 18 119/80 98 %      Temp Source Heart Rate Source Patient Position - Orthostatic VS BP Location FiO2 (%)   Temporal Monitor Sitting Left arm --      Pain Score       No Pain        Wt Readings from Last 1 Encounters:   01/13/20 69 5 kg (153 lb 3 5 oz)     Additional Vital Signs:   Date/Time  Temp  Pulse  Resp  BP  SpO2  O2 Device  Patient Position - Orthostatic VS   01/13/20 2238  97 8 °F (36 6 °C)  69  18  101/61  96 %  None (Room air)  Lying   01/13/20 1954  97 8 °F (36 6 °C)  68  18  106/61  100 %  None (Room air)  Lying   01/13/20 1900  97 9 °F (36 6 °C)  76  15  110/70  100 %  None (Room air)  Lying   01/13/20 1715  --  68  14  114/74  100 %  None (Room air)  Lying   01/13/20 1524  97 6 °F (36 4 °C)  82  18  119/80  98 %  None (Room air)  Sitting   Pertinent Labs/Diagnostic Test Results:   Results from last 7 days   Lab Units 01/14/20  0533 01/13/20  1816   WBC Thousand/uL 10 10 11 60*   HEMOGLOBIN g/dL 12 7 13 7   HEMATOCRIT % 38 4* 41 3*   PLATELETS Thousands/uL 204 214   NEUTROS ABS Thousands/µL 7 00* 8 40*     Results from last 7 days   Lab Units 01/14/20  0533 01/13/20  1816   SODIUM mmol/L 139 140   POTASSIUM mmol/L 3 6 3 8   CHLORIDE mmol/L 107 106   CO2 mmol/L 23 26   ANION GAP mmol/L 9 8   BUN mg/dL 8 8   CREATININE mg/dL 0 71 0 79   EGFR ml/min/1 73sq m 109 96   CALCIUM mg/dL 8 3* 8 7     Results from last 7 days   Lab Units 01/13/20  1816   AST U/L 11*   ALT U/L 9   ALK PHOS U/L 60   TOTAL PROTEIN g/dL 6 6   ALBUMIN g/dL 4 2   TOTAL BILIRUBIN mg/dL 0 60     Results from last 7 days   Lab Units 01/14/20  0533 01/13/20  1816   GLUCOSE RANDOM mg/dL 75 79     Results from last 7 days   Lab Units 01/13/20  1911   C DIFF TOXIN B  Positive*   ED Treatment:   Medication Administration from 01/13/2020 1457 to 01/13/2020 1953       Date/Time Order Dose Route     01/13/2020 1627 sodium chloride 0 9 % bolus 1,000 mL 1,000 mL Intravenous        Past Medical History:   Diagnosis Date    Migraine     Ovarian cyst     Palpitations     Renal disorder     acute kidney injury    Tachycardia     Uterine fibroid     Uterine leiomyoma     Last assessed 8/28/2017     Present on Admission:   C  difficile diarrhea   Leukocytosis  Admitting Diagnosis: Diarrhea [R19 7]  C  difficile diarrhea [A04 72]  Dehydration, mild [E86 0]  C  difficile colitis [A04 72]  Age/Sex: 42 y o  female  Admission Orders:  Med surg  Contact isolation  Consult GI  Clear liquids  Scheduled Medications:  nystatin  Topical BID   vancomycin 125 mg Oral Q6H [facility]     Continuous IV Infusions:  sodium chloride 125 mL/hr Intravenous Continuous     PRN Meds:  acetaminophen 650 mg Oral Q6H PRN   ondansetron 4 mg Intravenous Q6H PRN   SUMAtriptan 100 mg Oral Once PRN     Network Utilization Review Department  [email or web address]  ATTENTION: Please call with any questions or concerns to [phone] and carefully listen to the prompts so that you are directed to the right person   All voicemails are confidential   [employer] Feeling all requests for admission clinical reviews, approved or denied determinations and any other requests to dedicated fax number below belonging to the campus where the patient is receiving treatment   List of dedicated fax numbers for the Facilities:  [address] DENIALS (Administrative/Medical Necessity) [phone]   [address] (Maternity/NICU/Pediatrics) [phone]   Davin Owens [phone]   Marquise Reyez [phone]   Maryann Hines [phone]   Vida Guerin [phone]   [address] [place] [address] [phone]   [facility]  [phone]   [address] [facility]  [address] [address] [address] [facility] [phone]

## 2020-01-14 NOTE — ASSESSMENT & PLAN NOTE
· History C diff x2 after antibiotics with Keflex and clindamycin  · Patient was recently on a vancomycin regimen and by day 8/10 stools improved  · She reports after 10 days of stopping the medicine she has had diffuse watery diarrhea  · Follow-up stool testing  · Vancomycin 125 mg q i d  · Patient was seen by GI on Monday in the office and was directed to come to the emergency room, they are planning to evaluate the patient in the a m   And arranging for Dificid  · Consider ID consult also

## 2020-01-14 NOTE — H&P
H&P- Leslie John 1980, 42 y o  female MRN: [id number]    Unit/Bed#: -01 Encounter: [id number]    Primary Care Provider: ERMA Reardon   Date and time admitted to hospital: 1/13/2020  3:22 PM        * C  difficile diarrhea  Assessment & Plan  · History C diff x2 after antibiotics with Keflex and clindamycin  · Patient was recently on a vancomycin regimen and by day 8/10 stools improved  · She reports after 10 days of stopping the medicine she has had diffuse watery diarrhea  · Follow-up stool testing  · Vancomycin 125 mg q i d  · Patient was seen by GI on Monday in the office and was directed to come to the emergency room, they are planning to evaluate the patient in the a m  And arranging for Dificid  · Consider ID consult also      Leukocytosis  Assessment & Plan  · Monitor labs    VTE Prophylaxis: venadynes by age  Code Status:  Full code  POLST: POLST is not applicable to this patient  Discussion with family:  Patient    Anticipated Length of Stay:  Patient will be admitted on an Observation basis with an anticipated length of stay of  < 2 midnights  Justification for Hospital Stay:  Follow-up stool testing    Chief Complaint:   Diarrhea    History of Present Illness:    Leslie John is a 42 y o  female who presents with diarrhea  Patient with history of recurrent C diff colitis also acute kidney injury  Patient reports back November 22nd she had mucousy stools and was given Cipro x2 rounds and also some Flagyl dosing in between there  She had continued diarrhea seen by GI and was given 10 days of vancomycin  She reports by day 8 her stools started normalizing and by 10 they were better she has been off the antibiotics approximately 10 days and now reports liquid stools, cramping abdominal pain, sweats and chills  She also reports having burning and yeast infection and had 2 doses of Diflucan 1 on Thursday when on Saturday    Patient was seen at GI office today and they reported her blood pressure low at 74/50 they are planning to get her the Dificid ordered and delivered to her house and GI will see her in the hospital    Review of Systems:  Review of Systems   Constitutional: Positive for appetite change and chills  Negative for fever  HENT: Negative for rhinorrhea, sore throat and trouble swallowing  Eyes: Negative for discharge and redness  Respiratory: Negative for cough and shortness of breath  Cardiovascular: Negative for chest pain and palpitations  Gastrointestinal: Positive for abdominal pain, diarrhea and nausea  Negative for vomiting  Genitourinary: Positive for dysuria and vaginal discharge  Musculoskeletal: Negative for back pain and neck pain  Skin: Negative for pallor and rash  Neurological: Negative for dizziness and syncope  Psychiatric/Behavioral: Negative for agitation and hallucinations  Past Medical and Surgical History:   Past Medical History:   Diagnosis Date    Migraine     Ovarian cyst     Palpitations     Renal disorder     acute kidney injury    Tachycardia     Uterine fibroid     Uterine leiomyoma     Last assessed 8/28/2017       Past Surgical History:   Procedure Laterality Date    APPENDECTOMY      CERVICAL BIOPSY  W/ LOOP ELECTRODE EXCISION      Last assessed 8/28/2017    COLONOSCOPY      COLPOSCOPY      DENTAL SURGERY      DILATION AND CURETTAGE OF UTERUS      Last assessed 8/28/2017    ESOPHAGOGASTRODUODENOSCOPY      OVARIAN CYST REMOVAL      Last assessed 8/28/2017    TONSILLECTOMY         Meds/Allergies:  Prior to Admission medications    Medication Sig Start Date End Date Taking?  Authorizing Provider   cholestyramine (QUESTRAN) 4 g packet Take 1 packet (4 g total) by mouth daily 11/21/19   ERMA Osuna   fluconazole (DIFLUCAN) 150 mg tablet Take 1 tablet (150 mg total) by mouth daily for 7 doses 1/13/20 1/20/20  ERMA Mendoza   nystatin (MYCOSTATIN) cream Apply topically 2 (two) times a day 1/10/20   ERMA Ferris   SUMAtriptan (IMITREX) 100 mg tablet Take 1 tablet (100 mg total) by mouth once as needed for migraine for up to 1 dose Up to 2 doses per migraine as needed 11/21/19   ERMA Ferris     I have reviewed home medications with patient personally  Allergies: Allergies   Allergen Reactions    Hydrocodone Anaphylaxis    Morphine Palpitations    Morphine And Related Anaphylaxis and Palpitations    Oxycodone Anaphylaxis    Penicillins Fever and Other (See Comments)    Clomid [Clomiphene]     Sulfa Antibiotics Fever       Social History:  Marital Status: Single   Occupation: Nurse  Patient Pre-hospital Living Situation:  Home  Patient Pre-hospital Level of Mobility:  Mobile  Patient Pre-hospital Diet Restrictions:  None  Substance Use History:   Social History     Substance and Sexual Activity   Alcohol Use No     Social History     Tobacco Use   Smoking Status Never Smoker   Smokeless Tobacco Never Used     Social History     Substance and Sexual Activity   Drug Use No       Family History:  I have reviewed the patients family history    Physical Exam:   Vitals:   Blood Pressure: 106/61 (01/13/20 1954)  Pulse: 68 (01/13/20 1954)  Temperature: 97 8 °F (36 6 °C) (01/13/20 1954)  Temp Source: Temporal (01/13/20 1954)  Respirations: 18 (01/13/20 1954)  Height: 5' 8" (172 7 cm) (01/13/20 1954)  Weight - Scale: 69 5 kg (153 lb 3 5 oz) (01/13/20 1954)  SpO2: 100 % (01/13/20 1954)    Physical Exam   Constitutional: She is oriented to person, place, and time  She appears well-developed and well-nourished  HENT:   Head: Normocephalic and atraumatic  Eyes: Conjunctivae and EOM are normal  Right eye exhibits no discharge  Left eye exhibits no discharge  Neck: Normal range of motion  No tracheal deviation present  Cardiovascular: Normal rate, regular rhythm and normal heart sounds  Exam reveals no gallop and no friction rub  No murmur heard    Pulmonary/Chest: Effort normal and breath sounds normal  No respiratory distress  She has no wheezes  She has no rales  Abdominal: Soft  Bowel sounds are normal  She exhibits no distension and no mass  There is no tenderness  There is no guarding  Musculoskeletal: Normal range of motion  She exhibits no edema, tenderness or deformity  Neurological: She is alert and oriented to person, place, and time  Skin: Skin is warm and dry  No rash noted  No erythema  No pallor  Psychiatric: She has a normal mood and affect  Her behavior is normal  Judgment and thought content normal    Nursing note and vitals reviewed  Additional Data:   Lab Results: I have personally reviewed pertinent reports  Results from last 7 days   Lab Units 01/13/20  1816   WBC Thousand/uL 11 60*   HEMOGLOBIN g/dL 13 7   HEMATOCRIT % 41 3*   PLATELETS Thousands/uL 214   NEUTROS PCT % 73   LYMPHS PCT % 19*   MONOS PCT % 7   EOS PCT % 1     Results from last 7 days   Lab Units 01/13/20  1816   POTASSIUM mmol/L 3 8   CHLORIDE mmol/L 106   CO2 mmol/L 26   BUN mg/dL 8   CREATININE mg/dL 0 79   CALCIUM mg/dL 8 7   ALK PHOS U/L 60   ALT U/L 9   AST U/L 11*       [facility] Records Reviewed: Yes     ** Please Note: This note has been constructed using a voice recognition system   **

## 2020-01-14 NOTE — PLAN OF CARE
Problem: Nutrition/Hydration-ADULT  Goal: Nutrient/Hydration intake appropriate for improving, restoring or maintaining nutritional needs  Description  Monitor and assess patient's nutrition/hydration status for malnutrition  Collaborate with interdisciplinary team and initiate plan and interventions as ordered  Monitor patient's weight and dietary intake as ordered or per policy  Utilize nutrition screening tool and intervene as necessary  Determine patient's food preferences and provide high-protein, high-caloric foods as appropriate       INTERVENTIONS:  - Monitor oral intake, urinary output, labs, and treatment plans  - Assess nutrition and hydration status and recommend course of action  - Evaluate amount of meals eaten  - Assist patient with eating if necessary   - Allow adequate time for meals  - Recommend/ encourage appropriate diets, oral nutritional supplements, and vitamin/mineral supplements  - Order, calculate, and assess calorie counts as needed  - Recommend, monitor, and adjust tube feedings and TPN/PPN based on assessed needs  - Assess need for intravenous fluids  - Provide specific nutrition/hydration education as appropriate  - Include patient/family/caregiver in decisions related to nutrition  Outcome: Progressing     Problem: PAIN - ADULT  Goal: Verbalizes/displays adequate comfort level or baseline comfort level  Description  Interventions:  - Encourage patient to monitor pain and request assistance  - Assess pain using appropriate pain scale  - Administer analgesics based on type and severity of pain and evaluate response  - Implement non-pharmacological measures as appropriate and evaluate response  - Consider cultural and social influences on pain and pain management  - Notify physician/advanced practitioner if interventions unsuccessful or patient reports new pain  Outcome: Progressing     Problem: INFECTION - ADULT  Goal: Absence or prevention of progression during hospitalization  Description  INTERVENTIONS:  - Assess and monitor for signs and symptoms of infection  - Monitor lab/diagnostic results  - Monitor all insertion sites, i e  indwelling lines, tubes, and drains  - [place] appropriate cooling/warming therapies per order  - Administer medications as ordered  - Instruct and encourage patient and family to use good hand hygiene technique  - Identify and instruct in appropriate isolation precautions for identified infection/condition   Outcome: Progressing  Goal: Absence of fever/infection during neutropenic period  Description  INTERVENTIONS:  - Monitor WBC    Outcome: Progressing     Problem: SAFETY ADULT  Goal: Patient will remain free of falls  Description  INTERVENTIONS:  - Assess patient frequently for physical needs  -  Identify cognitive and physical deficits and behaviors that affect risk of falls    -  [place] fall precautions as indicated by assessment   - Educate patient/family on patient safety including physical limitations  - Instruct patient to call for assistance with activity based on assessment  - Modify environment to reduce risk of injury  - Consider OT/PT consult to assist with strengthening/mobility  Outcome: Progressing  Goal: Maintain or return to baseline ADL function  Description  INTERVENTIONS:  -  Assess patient's ability to carry out ADLs; assess patient's baseline for ADL function and identify physical deficits which impact ability to perform ADLs (bathing, care of mouth/teeth, toileting, grooming, dressing, etc )  - Assess/evaluate cause of self-care deficits   - Assess range of motion  - Assess patient's mobility; develop plan if impaired  - Assess patient's need for assistive devices and provide as appropriate  - Encourage maximum independence but intervene and supervise when necessary  - Involve family in performance of ADLs  - Assess for home care needs following discharge   - Consider OT consult to assist with ADL evaluation and planning for discharge  - Provide patient education as appropriate  Outcome: Progressing  Goal: Maintain or return mobility status to optimal level  Description  INTERVENTIONS:  - Assess patient's baseline mobility status (ambulation, transfers, stairs, etc )    - Identify cognitive and physical deficits and behaviors that affect mobility  - Identify mobility aids required to assist with transfers and/or ambulation (gait belt, sit-to-stand, lift, walker, cane, etc )  - [place] fall precautions as indicated by assessment  - Record patient progress and toleration of activity level on Mobility SBAR; progress patient to next Phase/Stage  - Instruct patient to call for assistance with activity based on assessment  - Consider rehabilitation consult to assist with strengthening/weightbearing, etc   Outcome: Progressing     Problem: GASTROINTESTINAL - ADULT  Goal: Maintains or returns to baseline bowel function  Description  INTERVENTIONS:  - Assess bowel function  - Encourage oral fluids to ensure adequate hydration  - Administer IV fluids if ordered to ensure adequate hydration  - Administer ordered medications as needed  - Encourage mobilization and activity  - Consider nutritional services referral to assist patient with adequate nutrition and appropriate food choices  Outcome: Progressing  Goal: Maintains adequate nutritional intake  Description  INTERVENTIONS:  - Monitor percentage of each meal consumed  - Identify factors contributing to decreased intake, treat as appropriate  - Assist with meals as needed  - Monitor I&O, weight, and lab values if indicated  - Obtain nutrition services referral as needed  Outcome: Progressing

## 2020-01-15 ENCOUNTER — TRANSITIONAL CARE MANAGEMENT (OUTPATIENT)
Dept: FAMILY MEDICINE CLINIC | Facility: CLINIC | Age: 40
End: 2020-01-15

## 2020-01-17 ENCOUNTER — OFFICE VISIT (OUTPATIENT)
Dept: FAMILY MEDICINE CLINIC | Facility: CLINIC | Age: 40
End: 2020-01-17
Payer: COMMERCIAL

## 2020-01-17 VITALS
OXYGEN SATURATION: 98 % | RESPIRATION RATE: 18 BRPM | DIASTOLIC BLOOD PRESSURE: 68 MMHG | SYSTOLIC BLOOD PRESSURE: 120 MMHG | HEIGHT: 68 IN | HEART RATE: 82 BPM | BODY MASS INDEX: 20.46 KG/M2 | WEIGHT: 135 LBS | TEMPERATURE: 98.6 F

## 2020-01-17 DIAGNOSIS — A04.72 C. DIFFICILE DIARRHEA: ICD-10-CM

## 2020-01-17 DIAGNOSIS — B37.9 YEAST INFECTION: Primary | ICD-10-CM

## 2020-01-17 PROCEDURE — 99495 TRANSJ CARE MGMT MOD F2F 14D: CPT | Performed by: NURSE PRACTITIONER

## 2020-01-17 NOTE — PATIENT INSTRUCTIONS
Continue Vanco taper and f/u with GI as scheduled  Work note given- encouraged to get FMLA paperwork completed  Nystatin/Triamcinolone cream as directed- do not use internally  Call or return for problems/concerns

## 2020-01-17 NOTE — PROGRESS NOTES
[facility]        NAME: Leah Gomez is a 42 y o  female  : 1980    MRN: [id number]  DATE: 2020  TIME: 11:33 AM    Assessment and Plan   Yeast infection [B37 9]  1  Yeast infection  nystatin-triamcinolone (MYCOLOG-II) cream   2  C  difficile diarrhea           Patient Instructions     Patient Instructions   Continue Vanco taper and f/u with GI as scheduled  Work note given- encouraged to get FMLA paperwork completed  Nystatin/Triamcinolone cream as directed- do not use internally  Call or return for problems/concerns          Chief Complaint     Chief Complaint   Patient presents with    Transition of Care Management     c diff         History of Present [address] admission follow up- continues with C Diff- on a Vanco taper- currently denies Diarrhea or Abd Pain/Cramping  Continues with vaginal yeast infection/itching/discharge  Is using Monistat and Nystatin- continues with itching at night  Review of Systems   Review of Systems   Constitutional: Negative for activity change, diaphoresis, fatigue and fever  HENT: Negative for congestion, facial swelling, hearing loss, rhinorrhea, sinus pressure, sinus pain, sneezing, sore throat and voice change  Eyes: Negative for discharge and visual disturbance  Respiratory: Negative for cough, choking, chest tightness, shortness of breath, wheezing and stridor  Cardiovascular: Negative for chest pain, palpitations and leg swelling  Gastrointestinal: Negative for abdominal distention, abdominal pain, constipation, diarrhea, nausea and vomiting  Endocrine: Negative for polydipsia, polyphagia and polyuria  Genitourinary: Positive for vaginal discharge (and itching)  Negative for difficulty urinating, dysuria, frequency and urgency  Musculoskeletal: Negative for arthralgias, back pain, gait problem, joint swelling, myalgias, neck pain and neck stiffness  Skin: Positive for rash (vaginal)   Negative for color change and wound  Neurological: Negative for dizziness, syncope, speech difficulty, weakness, light-headedness and headaches  Hematological: Negative for adenopathy  Does not bruise/bleed easily  Psychiatric/Behavioral: Negative for agitation, behavioral problems, confusion, hallucinations, sleep disturbance and suicidal ideas  The patient is not nervous/anxious            Current Medications       Current Outpatient Medications:     nystatin (MYCOSTATIN) cream, Apply topically 2 (two) times a day, Disp: 30 g, Rfl: 3    saccharomyces boulardii (FLORASTOR) 250 mg capsule, Take 1 capsule (250 mg total) by mouth 2 (two) times a day, Disp: 60 capsule, Rfl: 0    SUMAtriptan (IMITREX) 100 mg tablet, Take 1 tablet (100 mg total) by mouth once as needed for migraine for up to 1 dose Up to 2 doses per migraine as needed, Disp: 9 tablet, Rfl: 1    vancomycin (VANCOCIN) 125 MG capsule, Take 1 capsule (125 mg total) by mouth 4 (four) times a day for 90 doses 125 mg 4 times a day for 14 days 125 mg twice a day for 7 days 125 mg daily for 7 days 125 mg every other day for 14 days, Disp: 90 capsule, Rfl: 0    nystatin-triamcinolone (MYCOLOG-II) cream, Apply topically 4 (four) times a day, Disp: 60 g, Rfl: 1    Current Allergies     Allergies as of 01/17/2020 - Reviewed 01/17/2020   Allergen Reaction Noted    Hydrocodone Anaphylaxis 09/27/2018    Morphine Palpitations     Morphine and related Anaphylaxis and Palpitations 02/12/2014    Oxycodone Anaphylaxis 09/27/2018    Penicillins Fever and Other (See Comments) 02/12/2014    Clomid [clomiphene]  09/25/2018    Sulfa antibiotics Fever 08/28/2017            The following portions of the patient's history were reviewed and updated as appropriate: allergies, current medications, past family history, past medical history, past social history, past surgical history and problem list      Past Medical History:   Diagnosis Date    Migraine     Ovarian cyst     Palpitations     Renal disorder     acute kidney injury    Tachycardia     Uterine fibroid     Uterine leiomyoma     Last assessed 8/28/2017       Past Surgical History:   Procedure Laterality Date    APPENDECTOMY      CERVICAL BIOPSY  W/ LOOP ELECTRODE EXCISION      Last assessed 8/28/2017    COLONOSCOPY      COLPOSCOPY      DENTAL SURGERY      DILATION AND CURETTAGE OF UTERUS      Last assessed 8/28/2017    ESOPHAGOGASTRODUODENOSCOPY      OVARIAN CYST REMOVAL      Last assessed 8/28/2017    TONSILLECTOMY         Family History   Problem Relation Age of Onset    Other Mother         Cardiac Disorder    Heart attack Mother     Arrhythmia Mother     Coronary artery disease Mother     Hyperlipidemia Mother     Aortic aneurysm Father         Abdomincal aortic aneurysm    Hypertension Father     Cerebral aneurysm Family     Heart failure Paternal Grandmother     Stroke Neg Hx     Clotting disorder Neg Hx     Cancer Neg Hx          Medications have been verified  Objective   /68   Pulse 82   Temp 98 6 °F (37 °C)   Resp 18   Ht 5' 8" (1 727 m)   Wt 61 2 kg (135 lb)   LMP 01/13/2020   SpO2 98%   BMI 20 53 kg/m²        Physical Exam     Physical Exam   Constitutional: She is oriented to person, place, and time  Vital signs are normal  She appears well-developed and well-nourished  She is active and cooperative  No distress  Eyes: EOM are normal    Cardiovascular: Normal rate, regular rhythm and normal heart sounds  No murmur heard  Pulmonary/Chest: Effort normal and breath sounds normal  No respiratory distress  She has no wheezes  Neurological: She is alert and oriented to person, place, and time  Skin: Skin is warm and dry  No rash noted  She is not diaphoretic  No erythema  Psychiatric: She has a normal mood and affect  Her behavior is normal  Judgment and thought content normal    Nursing note and vitals reviewed            PHQ-9 Depression Screening    PHQ-9: Frequency of the following problems over the past two weeks:       Little interest or pleasure in doing things:  0 - not at all  Feeling down, depressed, or hopeless:  0 - not at all  PHQ-2 Score:  0

## 2020-01-17 NOTE — LETTER
January 17, 2020     Patient: Belkis Morrow   YOB: 1980   Date of Visit: 1/17/2020       To Whom it May Concern:    [place] is under my professional care  She was seen in my office on 1/17/2020  She may return to work on 1/20/20  If you have any questions or concerns, please don't hesitate to call           Sincerely,          ERMA Diamond        CC: No Recipients

## 2020-01-24 ENCOUNTER — APPOINTMENT (OUTPATIENT)
Dept: LAB | Facility: CLINIC | Age: 40
End: 2020-01-24

## 2020-01-24 ENCOUNTER — TRANSCRIBE ORDERS (OUTPATIENT)
Dept: LAB | Facility: CLINIC | Age: 40
End: 2020-01-24

## 2020-01-24 DIAGNOSIS — Z01.84 IMMUNITY STATUS TESTING: ICD-10-CM

## 2020-01-24 DIAGNOSIS — Z01.84 IMMUNITY STATUS TESTING: Primary | ICD-10-CM

## 2020-01-24 PROCEDURE — 86735 MUMPS ANTIBODY: CPT

## 2020-01-24 PROCEDURE — 36415 COLL VENOUS BLD VENIPUNCTURE: CPT

## 2020-01-28 LAB — MUV IGG SER QL: NORMAL

## 2020-02-14 ENCOUNTER — APPOINTMENT (OUTPATIENT)
Dept: LAB | Facility: CLINIC | Age: 40
End: 2020-02-14
Payer: COMMERCIAL

## 2020-02-14 ENCOUNTER — TRANSCRIBE ORDERS (OUTPATIENT)
Dept: LAB | Facility: CLINIC | Age: 40
End: 2020-02-14

## 2020-02-14 DIAGNOSIS — R19.7 DIARRHEA, UNSPECIFIED TYPE: ICD-10-CM

## 2020-02-14 DIAGNOSIS — Z86.19 HISTORY OF CLOSTRIDIUM DIFFICILE INFECTION: ICD-10-CM

## 2020-02-14 DIAGNOSIS — Z86.19 HISTORY OF CLOSTRIDIUM DIFFICILE INFECTION: Primary | ICD-10-CM

## 2020-02-14 PROCEDURE — 87493 C DIFF AMPLIFIED PROBE: CPT

## 2020-02-15 LAB — C DIFF TOX GENS STL QL NAA+PROBE: NEGATIVE

## 2020-03-10 ENCOUNTER — APPOINTMENT (OUTPATIENT)
Dept: LAB | Facility: CLINIC | Age: 40
End: 2020-03-10
Payer: COMMERCIAL

## 2020-03-10 ENCOUNTER — TRANSCRIBE ORDERS (OUTPATIENT)
Dept: LAB | Facility: CLINIC | Age: 40
End: 2020-03-10

## 2020-03-10 DIAGNOSIS — R19.7 DIARRHEA, UNSPECIFIED TYPE: ICD-10-CM

## 2020-03-10 DIAGNOSIS — Z09 FOLLOW-UP EXAM AFTER COMPLETING HIGH-RISK MEDICATION: ICD-10-CM

## 2020-03-10 DIAGNOSIS — R19.7 DIARRHEA, UNSPECIFIED TYPE: Primary | ICD-10-CM

## 2020-03-10 DIAGNOSIS — Z86.19 HISTORY OF CLOSTRIDIOIDES DIFFICILE INFECTION: ICD-10-CM

## 2020-03-10 PROCEDURE — 87493 C DIFF AMPLIFIED PROBE: CPT

## 2020-03-11 LAB — C DIFF TOX GENS STL QL NAA+PROBE: NEGATIVE

## 2020-03-26 ENCOUNTER — APPOINTMENT (OUTPATIENT)
Dept: LAB | Facility: CLINIC | Age: 40
End: 2020-03-26
Payer: COMMERCIAL

## 2020-03-26 ENCOUNTER — TRANSCRIBE ORDERS (OUTPATIENT)
Dept: ADMINISTRATIVE | Facility: HOSPITAL | Age: 40
End: 2020-03-26

## 2020-03-26 DIAGNOSIS — R19.7 DIARRHEA, UNSPECIFIED TYPE: ICD-10-CM

## 2020-03-26 DIAGNOSIS — R10.9 ABDOMINAL PAIN, UNSPECIFIED ABDOMINAL LOCATION: ICD-10-CM

## 2020-03-26 DIAGNOSIS — R10.9 ABDOMINAL CRAMPING: ICD-10-CM

## 2020-03-26 DIAGNOSIS — R19.7 DIARRHEA, UNSPECIFIED TYPE: Primary | ICD-10-CM

## 2020-03-26 PROCEDURE — 87505 NFCT AGENT DETECTION GI: CPT

## 2020-03-26 PROCEDURE — 87493 C DIFF AMPLIFIED PROBE: CPT

## 2020-03-27 LAB
C DIFF TOX A+B STL QL IA: POSITIVE
C DIFF TOX GENS STL QL NAA+PROBE: POSITIVE
CAMPYLOBACTER DNA SPEC NAA+PROBE: NORMAL
SALMONELLA DNA SPEC QL NAA+PROBE: NORMAL
SHIGA TOXIN STX GENE SPEC NAA+PROBE: NORMAL
SHIGELLA DNA SPEC QL NAA+PROBE: NORMAL

## 2020-04-02 DIAGNOSIS — B95.8 STAPH INFECTION: Primary | ICD-10-CM

## 2020-04-09 ENCOUNTER — HOSPITAL ENCOUNTER (OUTPATIENT)
Dept: GASTROENTEROLOGY | Facility: HOSPITAL | Age: 40
Discharge: HOME | End: 2020-04-09
Attending: INTERNAL MEDICINE

## 2020-06-29 ENCOUNTER — DOCUMENTATION (OUTPATIENT)
Dept: OTHER | Facility: HOSPITAL | Age: 40
End: 2020-06-29

## 2020-06-29 DIAGNOSIS — Z20.822 ENCOUNTER FOR LABORATORY TESTING FOR COVID-19 VIRUS: Primary | ICD-10-CM

## 2020-08-27 ENCOUNTER — HOSPITAL ENCOUNTER (OUTPATIENT)
Dept: GASTROENTEROLOGY | Facility: HOSPITAL | Age: 40
Setting detail: OUTPATIENT SURGERY
Discharge: HOME | End: 2020-08-27
Attending: INTERNAL MEDICINE
Payer: COMMERCIAL

## 2020-08-27 ENCOUNTER — ANESTHESIA EVENT (OUTPATIENT)
Dept: GASTROENTEROLOGY | Facility: HOSPITAL | Age: 40
End: 2020-08-27

## 2020-08-27 ENCOUNTER — ANESTHESIA (OUTPATIENT)
Dept: GASTROENTEROLOGY | Facility: HOSPITAL | Age: 40
End: 2020-08-27

## 2020-08-27 VITALS
HEART RATE: 74 BPM | SYSTOLIC BLOOD PRESSURE: 111 MMHG | RESPIRATION RATE: 20 BRPM | TEMPERATURE: 97 F | DIASTOLIC BLOOD PRESSURE: 62 MMHG | OXYGEN SATURATION: 97 %

## 2020-08-27 DIAGNOSIS — B96.7: ICD-10-CM

## 2020-08-27 PROBLEM — N12 PYELONEPHRITIS: Status: RESOLVED | Noted: 2018-09-27 | Resolved: 2020-08-27

## 2020-08-27 PROBLEM — N17.9 AKI (ACUTE KIDNEY INJURY) (HCC): Status: RESOLVED | Noted: 2018-09-27 | Resolved: 2020-08-27

## 2020-08-27 LAB
EXT PREGNANCY TEST URINE: NEGATIVE
EXT. CONTROL: NORMAL

## 2020-08-27 PROCEDURE — 88305 TISSUE EXAM BY PATHOLOGIST: CPT | Performed by: PATHOLOGY

## 2020-08-27 PROCEDURE — 81025 URINE PREGNANCY TEST: CPT | Performed by: STUDENT IN AN ORGANIZED HEALTH CARE EDUCATION/TRAINING PROGRAM

## 2020-08-27 RX ORDER — SODIUM CHLORIDE, SODIUM LACTATE, POTASSIUM CHLORIDE, CALCIUM CHLORIDE 600; 310; 30; 20 MG/100ML; MG/100ML; MG/100ML; MG/100ML
125 INJECTION, SOLUTION INTRAVENOUS CONTINUOUS
Status: DISCONTINUED | OUTPATIENT
Start: 2020-08-27 | End: 2020-08-31 | Stop reason: HOSPADM

## 2020-08-27 RX ORDER — ONDANSETRON 2 MG/ML
INJECTION INTRAMUSCULAR; INTRAVENOUS AS NEEDED
Status: DISCONTINUED | OUTPATIENT
Start: 2020-08-27 | End: 2020-08-27

## 2020-08-27 RX ORDER — EPHEDRINE SULFATE 50 MG/ML
INJECTION INTRAVENOUS AS NEEDED
Status: DISCONTINUED | OUTPATIENT
Start: 2020-08-27 | End: 2020-08-27

## 2020-08-27 RX ORDER — PROPOFOL 10 MG/ML
INJECTION, EMULSION INTRAVENOUS AS NEEDED
Status: DISCONTINUED | OUTPATIENT
Start: 2020-08-27 | End: 2020-08-27

## 2020-08-27 RX ADMIN — PROPOFOL 50 MG: 10 INJECTION, EMULSION INTRAVENOUS at 09:29

## 2020-08-27 RX ADMIN — PROPOFOL 50 MG: 10 INJECTION, EMULSION INTRAVENOUS at 09:34

## 2020-08-27 RX ADMIN — PROPOFOL 50 MG: 10 INJECTION, EMULSION INTRAVENOUS at 09:26

## 2020-08-27 RX ADMIN — ONDANSETRON 4 MG: 2 INJECTION INTRAMUSCULAR; INTRAVENOUS at 10:02

## 2020-08-27 RX ADMIN — PROPOFOL 50 MG: 10 INJECTION, EMULSION INTRAVENOUS at 09:23

## 2020-08-27 RX ADMIN — PROPOFOL 50 MG: 10 INJECTION, EMULSION INTRAVENOUS at 09:24

## 2020-08-27 RX ADMIN — PROPOFOL 50 MG: 10 INJECTION, EMULSION INTRAVENOUS at 09:39

## 2020-08-27 RX ADMIN — SODIUM CHLORIDE, SODIUM LACTATE, POTASSIUM CHLORIDE, AND CALCIUM CHLORIDE 125 ML/HR: .6; .31; .03; .02 INJECTION, SOLUTION INTRAVENOUS at 08:23

## 2020-08-27 RX ADMIN — EPHEDRINE SULFATE 10 MG: 50 INJECTION, SOLUTION INTRAVENOUS at 09:29

## 2020-08-27 NOTE — H&P
Patient is the 73-year-old white female who works at the [employer]  He had C diff infection and treated for it  He has been having recurrent C diff infections as well  He says he remains okay as long as she is on oral vancomycin  Lately he has been having diarrhea and lower abdominal cramps for a long time  He is wondering if she has IV well  She denies any melena  Denies any nausea vomiting he has not had a colonoscopy for 10 years and will make sure she does not have inflammatory bowel disease  Afebrile  /60  Pulse 64  Respiratory rate 16  Chest lungs clear to auscultation  Heart rate rhythm regular  Abdomen soft nontender nondistended positive bowel  Neuro awake alert oriented x3 cranial nerves 2-12 intact  His stable for a colonoscopy  Denies any chest pain dyspnea  Informed consent was obtained  All questions answered  The benefits associated with the procedure were explained to the patient and agreeable to it

## 2020-08-27 NOTE — ANESTHESIA POSTPROCEDURE EVALUATION
Post-Op Assessment Note    CV Status:  Stable  Pain Score: 0    Pain management: adequate     Mental Status:  Alert   Hydration Status:  Stable   PONV Controlled:  Controlled   Airway Patency:  Patent      Post Op Vitals Reviewed: Yes      Staff: CRNA         No complications documented      BP      Temp     Pulse     Resp      SpO2

## 2020-08-27 NOTE — ANESTHESIA PREPROCEDURE EVALUATION
Procedure:  COLONOSCOPY    Relevant Problems   ANESTHESIA (within normal limits)      CARDIO   (+) Migraines      /RENAL   (+) ANDRA (acute kidney injury) ([facility] [address] ) (Resolved)      NEURO/PSYCH   (+) History of migraine      Other   (+) Bronchospasm, exercise-induced        Physical Exam    Airway    Mallampati score: II         Dental   No notable dental hx     Cardiovascular  Cardiovascular exam normal    Pulmonary  Pulmonary exam normal     Other Findings      Lab Results   Component Value Date    WBC 10 10 01/14/2020    HGB 12 7 01/14/2020     01/14/2020     Lab Results   Component Value Date    SODIUM 139 01/14/2020    K 3 6 01/14/2020    BUN 8 01/14/2020    CREATININE 0 71 01/14/2020    EGFR 109 01/14/2020       Anesthesia Plan  ASA Score- 2     Anesthesia Type- IV sedation with anesthesia with ASA Monitors  Additional Monitors:   Airway Plan:     Comment: I, Alonzo Mehta MD, discussed risks (reviewed with patient on the anesthesia consent form), benefits and alternatives of monitored sedation including the possibility under sedation to have recall or mild discomfort          Plan Factors-    Chart reviewed  Patient summary reviewed  Induction- intravenous  Postoperative Plan-     Informed Consent- Anesthetic plan and risks discussed with patient  I personally reviewed this patient with the CRNA  Discussed and agreed on the Anesthesia Plan with the CRNA  Roxanne Lord

## 2020-08-27 NOTE — DISCHARGE INSTRUCTIONS
Colonoscopy   WHAT YOU NEED TO KNOW:   A colonoscopy is a procedure to examine the inside of your colon (intestine) with a scope  Polyps or tissue growths may have been removed during your colonoscopy  It is normal to feel bloated and to have some abdominal discomfort  You should be passing gas  If you have hemorrhoids or you had polyps removed, you may have a small amount of bleeding  DISCHARGE INSTRUCTIONS:   Seek care immediately if:   · You have a large amount of bright red blood in your bowel movements  · Your abdomen is hard and firm and you have severe pain  · You have sudden trouble breathing  Contact your healthcare provider if:   · You develop a rash or hives  · You have a fever within 24 hours of your procedure  · You have not had a bowel movement for 3 days after your procedure  · You have questions or concerns about your condition or care  Activity:   · Do not lift, strain, or run  for 3 days after your procedure  · Rest after your procedure  You have been given medicine to relax you  Do not  drive or make important decisions until the day after your procedure  Return to your normal activity as directed  · Relieve gas and discomfort from bloating  by lying on your right side with a heating pad on your abdomen  You may need to take short walks to help the gas move out  Eat small meals until bloating is relieved  If you had polyps removed: For 7 days after your procedure:  · Do not  take aspirin  · Do not  go on long car rides  Help prevent constipation:   · Eat a variety of healthy foods  Healthy foods include fruit, vegetables, whole-grain breads, low-fat dairy products, beans, lean meat, and fish  Ask if you need to be on a special diet  Your healthcare provider may recommend that you eat high-fiber foods such as cooked beans  Fiber helps you have regular bowel movements  · Drink liquids as directed    Adults should drink between 9 and 13 eight-ounce cups of liquid every day  Ask what amount is best for you  For most people, good liquids to drink are water, juice, and milk  · Exercise as directed  Talk to your healthcare provider about the best exercise plan for you  Exercise can help prevent constipation, decrease your blood pressure and improve your health  Follow up with your healthcare provider as directed:  Write down your questions so you remember to ask them during your visits  © 2017 [address] [employer] Information is for End User's use only and may not be sold, redistributed or otherwise used for commercial purposes  All illustrations and images included in CareNotes® are the copyrighted property of [employer]  or [employer] Erwin  The above information is an  only  It is not intended as medical advice for individual conditions or treatments  Talk to your doctor, nurse or pharmacist before following any medical regimen to see if it is safe and effective for you

## 2020-09-21 ENCOUNTER — APPOINTMENT (OUTPATIENT)
Dept: LAB | Facility: CLINIC | Age: 40
End: 2020-09-21
Payer: COMMERCIAL

## 2020-09-21 ENCOUNTER — TRANSCRIBE ORDERS (OUTPATIENT)
Dept: LAB | Facility: CLINIC | Age: 40
End: 2020-09-21

## 2020-09-21 DIAGNOSIS — R19.7 DIARRHEA, UNSPECIFIED TYPE: Primary | ICD-10-CM

## 2020-09-21 DIAGNOSIS — R19.7 DIARRHEA, UNSPECIFIED TYPE: ICD-10-CM

## 2020-09-21 LAB
ANION GAP SERPL CALCULATED.3IONS-SCNC: 8 MMOL/L (ref 4–13)
BUN SERPL-MCNC: 7 MG/DL (ref 5–25)
CALCIUM SERPL-MCNC: 9.1 MG/DL (ref 8.3–10.1)
CHLORIDE SERPL-SCNC: 107 MMOL/L (ref 100–108)
CO2 SERPL-SCNC: 25 MMOL/L (ref 21–32)
CREAT SERPL-MCNC: 0.88 MG/DL (ref 0.6–1.3)
GFR SERPL CREATININE-BSD FRML MDRD: 84 ML/MIN/1.73SQ M
GLUCOSE P FAST SERPL-MCNC: 91 MG/DL (ref 65–99)
POTASSIUM SERPL-SCNC: 3.7 MMOL/L (ref 3.5–5.3)
SODIUM SERPL-SCNC: 140 MMOL/L (ref 136–145)

## 2020-09-21 PROCEDURE — 36415 COLL VENOUS BLD VENIPUNCTURE: CPT

## 2020-09-21 PROCEDURE — 80048 BASIC METABOLIC PNL TOTAL CA: CPT

## 2020-09-21 PROCEDURE — 87493 C DIFF AMPLIFIED PROBE: CPT

## 2020-09-22 LAB
C DIFF TOX A+B STL QL IA: NEGATIVE
C DIFF TOX GENS STL QL NAA+PROBE: POSITIVE

## 2020-10-06 ENCOUNTER — TELEPHONE (OUTPATIENT)
Dept: INFECTIOUS DISEASES | Facility: CLINIC | Age: 40
End: 2020-10-06

## 2020-10-07 ENCOUNTER — TELEPHONE (OUTPATIENT)
Dept: OTHER | Facility: OTHER | Age: 40
End: 2020-10-07

## 2020-10-14 ENCOUNTER — TELEPHONE (OUTPATIENT)
Dept: INFECTIOUS DISEASES | Facility: CLINIC | Age: 40
End: 2020-10-14

## 2020-10-15 ENCOUNTER — OFFICE VISIT (OUTPATIENT)
Dept: INFECTIOUS DISEASES | Facility: CLINIC | Age: 40
End: 2020-10-15
Payer: COMMERCIAL

## 2020-10-15 ENCOUNTER — APPOINTMENT (OUTPATIENT)
Dept: LAB | Facility: CLINIC | Age: 40
End: 2020-10-15
Payer: COMMERCIAL

## 2020-10-15 ENCOUNTER — TRANSCRIBE ORDERS (OUTPATIENT)
Dept: LAB | Facility: CLINIC | Age: 40
End: 2020-10-15

## 2020-10-15 VITALS
HEART RATE: 78 BPM | RESPIRATION RATE: 20 BRPM | BODY MASS INDEX: 20.03 KG/M2 | WEIGHT: 132.2 LBS | TEMPERATURE: 99 F | HEIGHT: 68 IN | DIASTOLIC BLOOD PRESSURE: 68 MMHG | SYSTOLIC BLOOD PRESSURE: 102 MMHG

## 2020-10-15 DIAGNOSIS — A49.8 RECURRENT CLOSTRIDIOIDES DIFFICILE INFECTION: ICD-10-CM

## 2020-10-15 DIAGNOSIS — Z91.89 AT RISK FOR HIV DUE TO HETEROSEXUAL CONTACT: ICD-10-CM

## 2020-10-15 DIAGNOSIS — A49.8 RECURRENT CLOSTRIDIOIDES DIFFICILE INFECTION: Primary | ICD-10-CM

## 2020-10-15 PROBLEM — IMO0001: Status: ACTIVE | Noted: 2020-10-15

## 2020-10-15 PROCEDURE — 99204 OFFICE O/P NEW MOD 45 MIN: CPT | Performed by: INTERNAL MEDICINE

## 2020-10-15 PROCEDURE — 36415 COLL VENOUS BLD VENIPUNCTURE: CPT

## 2020-10-15 PROCEDURE — 87389 HIV-1 AG W/HIV-1&-2 AB AG IA: CPT

## 2020-10-15 RX ORDER — VANCOMYCIN HYDROCHLORIDE 125 MG/1
125 CAPSULE ORAL 4 TIMES DAILY
COMMUNITY
Start: 2020-10-11 | End: 2020-12-29 | Stop reason: SDUPTHER

## 2020-10-16 LAB — HIV 1+2 AB+HIV1 P24 AG SERPL QL IA: NORMAL

## 2020-10-22 ENCOUNTER — HOSPITAL ENCOUNTER (OUTPATIENT)
Dept: INFUSION CENTER | Facility: HOSPITAL | Age: 40
Discharge: HOME | End: 2020-10-22
Payer: COMMERCIAL

## 2020-10-22 VITALS
TEMPERATURE: 98.3 F | HEART RATE: 68 BPM | DIASTOLIC BLOOD PRESSURE: 68 MMHG | SYSTOLIC BLOOD PRESSURE: 114 MMHG | RESPIRATION RATE: 18 BRPM

## 2020-10-22 PROCEDURE — 96413 CHEMO IV INFUSION 1 HR: CPT

## 2020-10-22 RX ORDER — SODIUM CHLORIDE 9 MG/ML
20 INJECTION, SOLUTION INTRAVENOUS CONTINUOUS
Status: DISCONTINUED | OUTPATIENT
Start: 2020-10-22 | End: 2020-10-23 | Stop reason: HOSPADM

## 2020-10-22 RX ADMIN — SODIUM CHLORIDE 20 ML/HR: 0.9 INJECTION, SOLUTION INTRAVENOUS at 11:00

## 2020-10-22 RX ADMIN — SODIUM CHLORIDE 600 MG: 0.9 INJECTION, SOLUTION INTRAVENOUS at 11:20

## 2020-10-26 DIAGNOSIS — G43.909 MIGRAINE: ICD-10-CM

## 2020-10-26 RX ORDER — SUMATRIPTAN 100 MG/1
100 TABLET, FILM COATED ORAL ONCE AS NEEDED
Qty: 9 TABLET | Refills: 1 | Status: SHIPPED | OUTPATIENT
Start: 2020-10-26 | End: 2021-08-26 | Stop reason: SDUPTHER

## 2020-12-22 ENCOUNTER — OFFICE VISIT (OUTPATIENT)
Dept: GASTROENTEROLOGY | Facility: CLINIC | Age: 40
End: 2020-12-22
Payer: COMMERCIAL

## 2020-12-22 ENCOUNTER — TELEPHONE (OUTPATIENT)
Dept: GASTROENTEROLOGY | Facility: CLINIC | Age: 40
End: 2020-12-22

## 2020-12-22 VITALS
HEART RATE: 71 BPM | WEIGHT: 131.4 LBS | DIASTOLIC BLOOD PRESSURE: 70 MMHG | SYSTOLIC BLOOD PRESSURE: 116 MMHG | HEIGHT: 68 IN | BODY MASS INDEX: 19.91 KG/M2

## 2020-12-22 DIAGNOSIS — A04.72 CLOSTRIDIOIDES DIFFICILE DIARRHEA: ICD-10-CM

## 2020-12-22 DIAGNOSIS — K52.9 CHRONIC DIARRHEA: Primary | ICD-10-CM

## 2020-12-22 DIAGNOSIS — A09 DIARRHEA OF INFECTIOUS ORIGIN: Primary | ICD-10-CM

## 2020-12-22 PROCEDURE — 99244 OFF/OP CNSLTJ NEW/EST MOD 40: CPT | Performed by: INTERNAL MEDICINE

## 2020-12-22 RX ORDER — CHOLESTYRAMINE 4 G/9G
1 POWDER, FOR SUSPENSION ORAL 2 TIMES DAILY WITH MEALS
Qty: 60 PACKET | Refills: 3 | Status: SHIPPED | OUTPATIENT
Start: 2020-12-22 | End: 2021-03-23

## 2020-12-28 ENCOUNTER — LAB (OUTPATIENT)
Dept: LAB | Facility: CLINIC | Age: 40
End: 2020-12-28
Payer: COMMERCIAL

## 2020-12-28 DIAGNOSIS — A09 DIARRHEA OF INFECTIOUS ORIGIN: ICD-10-CM

## 2020-12-28 LAB
HBV CORE AB SER QL: NORMAL
HBV CORE IGM SER QL: NORMAL
HBV SURFACE AG SER QL: NORMAL
HCV AB SER QL: NORMAL

## 2020-12-28 PROCEDURE — 86592 SYPHILIS TEST NON-TREP QUAL: CPT

## 2020-12-28 PROCEDURE — 36415 COLL VENOUS BLD VENIPUNCTURE: CPT

## 2020-12-28 PROCEDURE — 86803 HEPATITIS C AB TEST: CPT

## 2020-12-28 PROCEDURE — 86705 HEP B CORE ANTIBODY IGM: CPT

## 2020-12-28 PROCEDURE — 86704 HEP B CORE ANTIBODY TOTAL: CPT

## 2020-12-28 PROCEDURE — 87177 OVA AND PARASITES SMEARS: CPT

## 2020-12-28 PROCEDURE — 87340 HEPATITIS B SURFACE AG IA: CPT

## 2020-12-28 PROCEDURE — 87209 SMEAR COMPLEX STAIN: CPT

## 2020-12-28 PROCEDURE — 87505 NFCT AGENT DETECTION GI: CPT

## 2020-12-28 PROCEDURE — 87329 GIARDIA AG IA: CPT

## 2020-12-29 DIAGNOSIS — A04.72 C. DIFFICILE COLITIS: Primary | ICD-10-CM

## 2020-12-29 LAB
CAMPYLOBACTER DNA SPEC NAA+PROBE: NORMAL
G LAMBLIA AG STL QL IA: NEGATIVE
O+P STL CONC: NORMAL
RPR SER QL: NORMAL
SALMONELLA DNA SPEC QL NAA+PROBE: NORMAL
SHIGA TOXIN STX GENE SPEC NAA+PROBE: NORMAL
SHIGELLA DNA SPEC QL NAA+PROBE: NORMAL

## 2020-12-29 RX ORDER — VANCOMYCIN HYDROCHLORIDE 125 MG/1
125 CAPSULE ORAL DAILY
Qty: 30 CAPSULE | Refills: 0 | Status: SHIPPED | OUTPATIENT
Start: 2020-12-29 | End: 2021-01-28

## 2021-01-21 NOTE — ED NOTES
Pt reports HA is improving and "edge" is off, but head is still "pounding " Dr Colon Quant aware       Flor Bae, RN  09/24/18 0246 Modified Advancement Flap Text: The defect edges were debeveled with a #15c scalpel blade.  Given the location of the defect, shape of the defect and the proximity to free margins a modified advancement flap was deemed most appropriate.  Using a sterile surgical marker, an appropriate advancement flap was drawn incorporating the defect and placing the expected incisions within the relaxed skin tension lines where possible.    The area thus outlined was incised deep to adipose tissue with a #15 scalpel blade.  The skin margins were undermined to an appropriate distance in all directions utilizing iris scissors.

## 2021-01-25 ENCOUNTER — ANESTHESIA EVENT (OUTPATIENT)
Dept: GASTROENTEROLOGY | Facility: HOSPITAL | Age: 41
End: 2021-01-25

## 2021-01-26 ENCOUNTER — ANESTHESIA (OUTPATIENT)
Dept: GASTROENTEROLOGY | Facility: HOSPITAL | Age: 41
End: 2021-01-26

## 2021-01-26 ENCOUNTER — HOSPITAL ENCOUNTER (OUTPATIENT)
Dept: GASTROENTEROLOGY | Facility: HOSPITAL | Age: 41
Setting detail: OUTPATIENT SURGERY
Discharge: HOME | End: 2021-01-26
Attending: INTERNAL MEDICINE | Admitting: INTERNAL MEDICINE
Payer: COMMERCIAL

## 2021-01-26 VITALS
HEART RATE: 77 BPM | TEMPERATURE: 98.4 F | SYSTOLIC BLOOD PRESSURE: 109 MMHG | RESPIRATION RATE: 16 BRPM | OXYGEN SATURATION: 100 % | DIASTOLIC BLOOD PRESSURE: 60 MMHG

## 2021-01-26 VITALS — HEART RATE: 83 BPM

## 2021-01-26 DIAGNOSIS — A04.72 C. DIFFICILE COLITIS: ICD-10-CM

## 2021-01-26 LAB
EXT PREGNANCY TEST URINE: NEGATIVE
EXT. CONTROL: NORMAL

## 2021-01-26 PROCEDURE — 45378 DIAGNOSTIC COLONOSCOPY: CPT | Performed by: INTERNAL MEDICINE

## 2021-01-26 PROCEDURE — 81025 URINE PREGNANCY TEST: CPT | Performed by: ANESTHESIOLOGY

## 2021-01-26 RX ORDER — LIDOCAINE HYDROCHLORIDE 10 MG/ML
INJECTION, SOLUTION EPIDURAL; INFILTRATION; INTRACAUDAL; PERINEURAL AS NEEDED
Status: DISCONTINUED | OUTPATIENT
Start: 2021-01-26 | End: 2021-01-26

## 2021-01-26 RX ORDER — SODIUM CHLORIDE, SODIUM LACTATE, POTASSIUM CHLORIDE, CALCIUM CHLORIDE 600; 310; 30; 20 MG/100ML; MG/100ML; MG/100ML; MG/100ML
125 INJECTION, SOLUTION INTRAVENOUS CONTINUOUS
Status: DISCONTINUED | OUTPATIENT
Start: 2021-01-26 | End: 2021-01-30 | Stop reason: HOSPADM

## 2021-01-26 RX ORDER — PROPOFOL 10 MG/ML
INJECTION, EMULSION INTRAVENOUS AS NEEDED
Status: DISCONTINUED | OUTPATIENT
Start: 2021-01-26 | End: 2021-01-26

## 2021-01-26 RX ADMIN — PROPOFOL 50 MG: 10 INJECTION, EMULSION INTRAVENOUS at 13:42

## 2021-01-26 RX ADMIN — SODIUM CHLORIDE, SODIUM LACTATE, POTASSIUM CHLORIDE, AND CALCIUM CHLORIDE 125 ML/HR: .6; .31; .03; .02 INJECTION, SOLUTION INTRAVENOUS at 12:53

## 2021-01-26 RX ADMIN — PROPOFOL 200 MG: 10 INJECTION, EMULSION INTRAVENOUS at 13:38

## 2021-01-26 RX ADMIN — LIDOCAINE HYDROCHLORIDE 50 MG: 10 INJECTION, SOLUTION EPIDURAL; INFILTRATION; INTRACAUDAL; PERINEURAL at 13:37

## 2021-01-26 NOTE — ANESTHESIA PREPROCEDURE EVALUATION
Procedure:  COLONOSCOPY    Relevant Problems   MUSCULOSKELETAL   (+) Bronchospasm, exercise-induced      NEURO/PSYCH   (+) History of migraine      PULMONARY   (+) SOB (shortness of breath)      Migraine    Tachycardia    Ovarian cyst    Uterine leiomyoma Last assessed 8/28/2017   Palpitations    Uterine fibroid    Renal disorder acute kidney injury     History of migraine   Bronchospasm, exercise-induced   Cervical dysplasia   Chronic diarrhea   Cyst of right ovary   Edema of left lower extremity   Migraines   Plantar fasciitis   Pain of plantar aspect of heel   SOB (shortness of breath)   Malaise and fatigue   Increased stomach acid   Clostridioides difficile diarrhea   Dehydration, mild   At risk for HIV due to heterosexual contact         Physical Exam    Airway    Mallampati score: I  TM Distance: >3 FB  Neck ROM: full     Dental       Cardiovascular  Cardiovascular exam normal    Pulmonary  Pulmonary exam normal     Other Findings        Anesthesia Plan  ASA Score- 2     Anesthesia Type- IV sedation with anesthesia with ASA Monitors  Additional Monitors:   Airway Plan:           Plan Factors-Exercise tolerance (METS): >4 METS  Chart reviewed  EKG reviewed  Imaging results reviewed  Existing labs reviewed  Patient summary reviewed  Induction- intravenous  Postoperative Plan-     Informed Consent- Anesthetic plan and risks discussed with patient  I personally reviewed this patient with the CRNA  Discussed and agreed on the Anesthesia Plan with the CRNA  Crys Murray

## 2021-01-26 NOTE — ANESTHESIA POSTPROCEDURE EVALUATION
Post-Op Assessment Note    CV Status:  Stable    Pain management: adequate     Mental Status:  Somnolent   Hydration Status:  Euvolemic   PONV Controlled:  Controlled   Airway Patency:  Patent      Post Op Vitals Reviewed: Yes      Staff: CRNA         No complications documented      BP   96/51   Temp     Pulse  81   Resp   20   SpO2   100

## 2021-01-26 NOTE — H&P
History and Physical -  [facility]  Donovan Mejia 47 y o  female MRN: [id number]                  HPI: Donovan Mejia is a 47y o  year old female who presents for colonoscopy for fecal microbiology transplant for recurrent C diff      REVIEW OF SYSTEMS: Per the HPI, and otherwise unremarkable      Historical Information   Past Medical History:   Diagnosis Date    Migraine     Ovarian cyst     Palpitations     Renal disorder     acute kidney injury    Tachycardia     Uterine fibroid     Uterine leiomyoma     Last assessed 8/28/2017     Past Surgical History:   Procedure Laterality Date    APPENDECTOMY      CERVICAL BIOPSY  W/ LOOP ELECTRODE EXCISION      Last assessed 8/28/2017    COLONOSCOPY      COLPOSCOPY      DENTAL SURGERY      DILATION AND CURETTAGE OF UTERUS      Last assessed 8/28/2017    ESOPHAGOGASTRODUODENOSCOPY      OVARIAN CYST REMOVAL      Last assessed 8/28/2017    TONSILLECTOMY       Social History   Social History     Substance and Sexual Activity   Alcohol Use Never    Frequency: Never     Social History     Substance and Sexual Activity   Drug Use No     Social History     Tobacco Use   Smoking Status Never Smoker   Smokeless Tobacco Never Used     Family History   Problem Relation Age of Onset    Other Mother         Cardiac Disorder    Heart attack Mother     Arrhythmia Mother     Coronary artery disease Mother     Hyperlipidemia Mother     Aortic aneurysm Father         Abdomincal aortic aneurysm    Hypertension Father     Cerebral aneurysm Family     Heart failure Paternal Grandmother     Stroke Neg Hx     Clotting disorder Neg Hx     Cancer Neg Hx        Meds/Allergies     (Not in a hospital admission)      Allergies   Allergen Reactions    Hydrocodone Anaphylaxis    Morphine Palpitations    Morphine And Related Anaphylaxis and Palpitations    Oxycodone Anaphylaxis    Penicillins Fever and Other (See Comments)    Clomid [Clomiphene]     Sulfa Antibiotics Fever       Objective     Blood pressure 108/66, pulse 99, temperature (!) 96 7 °F (35 9 °C), temperature source Oral, resp  rate 20, last menstrual period 01/05/2021, SpO2 99 %  PHYSICAL EXAM    /66   Pulse 99   Temp (!) 96 7 °F (35 9 °C) (Oral)   Resp 20   LMP 01/05/2021   SpO2 99%       Gen: NAD  CV: RRR  CHEST: Clear  ABD: soft, NT/ND  EXT: no edema      ASSESSMENT/PLAN:  This is a 47y o  year old female here for colonoscopy, and she is stable and optimized for her procedure

## 2021-01-26 NOTE — DISCHARGE INSTRUCTIONS
Colonoscopy   WHAT YOU NEED TO KNOW:   A colonoscopy is a procedure to examine the inside of your colon (intestine) with a scope  Polyps or tissue growths may have been removed during your colonoscopy  It is normal to feel bloated and to have some abdominal discomfort  You should be passing gas  If you have hemorrhoids or you had polyps removed, you may have a small amount of bleeding  DISCHARGE INSTRUCTIONS:   Seek care immediately if:   · You have a large amount of bright red blood in your bowel movements  · Your abdomen is hard and firm and you have severe pain  · You have sudden trouble breathing  Contact your healthcare provider if:   · You develop a rash or hives  · You have a fever within 24 hours of your procedure       · You have not had a bowel movement for 3 days after your procedure  · You have questions or concerns about your condition or care  Activity:   · Do not lift, strain, or run  for 3 days after your procedure  · Rest after your procedure  You have been given medicine to relax you  Do not  drive or make important decisions until the day after your procedure  Return to your normal activity as directed  · Relieve gas and discomfort from bloating  by lying on your right side with a heating pad on your abdomen  You may need to take short walks to help the gas move out  Eat small meals until bloating is relieved  If you had polyps removed: For 7 days after your procedure:  · Do not  take aspirin  · Do not  go on long car rides  Follow up with your healthcare provider as directed:  Write down your questions so you remember to ask them during your visits  © 2017 [address] [employer] is for End User's use only and may not be sold, redistributed or otherwise used for commercial purposes  All illustrations and images included in CareNotes® are the copyrighted property of [employer]  or Alfredo Erwin    The above information is an  only  It is not intended as medical advice for individual conditions or treatments  Talk to your doctor, nurse or pharmacist before following any medical regimen to see if it is safe and effective for you

## 2021-02-26 ENCOUNTER — TELEPHONE (OUTPATIENT)
Dept: GASTROENTEROLOGY | Facility: CLINIC | Age: 41
End: 2021-02-26

## 2021-02-26 NOTE — TELEPHONE ENCOUNTER
Spoke with patient  History of C diff s/p FMT 1/26/21  Patient c/o 2 loose BM yesterday and 4 diarrhea BM today  She was having normal formed BM daily, taking 1 cholestyramine packet a day  Denies nausea, vomiting  Fever, chills, SOB, abdominal pain, weakness  She is staying hydrated, eating well  Any suggestions?

## 2021-02-26 NOTE — TELEPHONE ENCOUNTER
I think you should have her do the cholestyramine 1 packet b i d   And explained to her that we want to see how she does over period of time and not make a decision based on 1 or 2 days

## 2021-03-02 NOTE — TELEPHONE ENCOUNTER
Spoke with patient  She will be sending her FMLA paperwork to us via fax tomorrow  Please keep a look out for this  She requests 1 day a month possibly

## 2021-03-21 PROBLEM — E86.0 DEHYDRATION, MILD: Status: RESOLVED | Noted: 2020-01-13 | Resolved: 2021-03-21

## 2021-03-21 PROBLEM — R60.0 EDEMA OF LEFT LOWER EXTREMITY: Status: RESOLVED | Noted: 2017-08-28 | Resolved: 2021-03-21

## 2021-03-21 PROBLEM — M72.2 PLANTAR FASCIITIS: Status: RESOLVED | Noted: 2017-06-08 | Resolved: 2021-03-21

## 2021-03-22 NOTE — PROGRESS NOTES
Assessment/Plan:    No problem-specific Assessment & Plan notes found for this encounter  Diagnoses and all orders for this visit:    Recurrent Clostridium difficile diarrhea  -     CBC and differential; Future    Status post fecal microbiota transplant  -     CBC and differential; Future    Migraine without status migrainosus, not intractable, unspecified migraine type  -     CBC and differential; Future    Hyperlipidemia, unspecified hyperlipidemia type  -     CBC and differential; Future  -     Comprehensive metabolic panel; Future  -     Lipid panel; Future  -     TSH, 3rd generation; Future    Excessive weight loss  -     CBC and differential; Future  -     TSH, 3rd generation; Future    Exposure to COVID-19 virus  -     SARS-CoV2 Antibody, Total (IgG, IgA, IgM) [facility]- Lab Collect; Future        Orders recommendations as noted above  Her extensive past medical history reviewed  Discussed with her the importance of avoiding antibiotics if at all possible  Advised her to contact our office or GI before she starts on any antibiotics  Watch for any recurrent issues with the diarrhea  Discussed with her that it can take at least 6 months to a year for her bowels to normalize  Watch for any dietary triggers any bowel issues  Discussed with her trying to increase the protein in her diet  Discussed the use of collagen protein since this will likely be somewhat easier to digest   Avoid dairy products since these seem to be a trigger  Watch for any worsening of the migraines symptoms  Continue with the Imitrex if needed  Follow-up with gyn regularly  Discussed with her following up again with the fertility specialist but she should likely give it approximately another month prior to pursuing any fertility treatment especially since she recently had the fecal transplant  Slip given for routine laboratory testing    Will check a lipid panel since she has had a history of elevated cholesterol in the past  No family history of breast cancer  Will hold off until 42 for screening mammogram   She will be getting her COVID vaccination in the near future  Will have her follow up in about 6-9 months or sooner if needed  Subjective:      Patient ID: Lauren Almonte is a 47 y o  female  She presents to establish as a new patient to the practice  She has had some extensive medical issues over the last few years  She initially started with urinary tract infection after hiking  This had worsened and had not responded to oral antibiotics  She did end up being hospitalized in on IV antibiotics  Symptoms continue to persist and was on recurrent antibiotics  Developed diarrhea which was chronic  Stool did show C diff  She had extensive treatment for this including multiple, extended courses of vancomycin as well as infusions  These were not effective long-term  C diff continue to return  Recently underwent fecal transplant about 2 weeks ago and symptoms have been very good since that point  Had been having 5-6 bowel movements on a daily basis and now is having 1 bowel movement  Is still significantly soft but not diarrhea  Had lost a significant amount await during this  Has lost close to 20 lb  Appetite is improved but still has to watch what she eats  Cannot eat certain types of vegetables or really any dairy products  Still has some cramping at times  Denies any nausea or vomiting  Denies any mucus or blood in her bowel movements but she did have a significant amount of mucus and blood in the past with the recurrent C diff  Denies any urinary symptoms  Denies any fevers or chills  Prior to the C diff, she had been starting fertility treatments and evaluation  This has, unfortunately, been on hold  Does have a history of elevated cholesterol in the past   Has not had this checked recently  Denies any chest pain or palpitations  Denies any significant shortness of breath        The following portions of the patient's history were reviewed and updated as appropriate:   She  has a past medical history of Migraine, Ovarian cyst, Palpitations, Renal disorder, Tachycardia, Uterine fibroid, and Uterine leiomyoma  She   Patient Active Problem List    Diagnosis Date Noted    Hyperlipidemia 03/23/2021    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    Excessive weight loss 03/23/2021    Recurrent Clostridium difficile diarrhea 01/13/2020    Increased stomach acid 02/01/2019    Malaise and fatigue 10/01/2018    History of migraine 09/27/2018    Cyst of right ovary 09/15/2017    Bronchospasm, exercise-induced 08/28/2017    Cervical dysplasia 08/28/2017    Chronic diarrhea 08/28/2017    Migraines 08/28/2017    Pain of plantar aspect of heel 06/08/2017     She  has a past surgical history that includes Tonsillectomy; Appendectomy; Cervical biopsy w/ loop electrode excision; Colonoscopy; Dental surgery; Esophagogastroduodenoscopy; Dilation and curettage of uterus; Ovarian cyst removal; and Colposcopy  Her family history includes Aortic aneurysm in her father; Arrhythmia in her mother; Cerebral aneurysm in her family; Clotting disorder in her father; Coronary artery disease in her mother; Heart attack in her mother; Heart failure in her paternal grandmother; Hyperlipidemia in her mother; Hypertension in her father; Other in her mother  She  reports that she has never smoked  She has never used smokeless tobacco  She reports that she does not drink alcohol or use drugs  Current Outpatient Medications   Medication Sig Dispense Refill    SUMAtriptan (Imitrex) 100 mg tablet Take 1 tablet (100 mg total) by mouth once as needed for migraine for up to 1 dose Up to 2 doses per migraine as needed 9 tablet 1     No current facility-administered medications for this visit        Current Outpatient Medications on File Prior to Visit   Medication Sig    SUMAtriptan (Imitrex) 100 mg tablet Take 1 tablet (100 mg total) by mouth once as needed for migraine for up to 1 dose Up to 2 doses per migraine as needed    [DISCONTINUED] cholestyramine (QUESTRAN) 4 g packet Take 1 packet (4 g total) by mouth 2 (two) times a day with meals    [DISCONTINUED] saccharomyces boulardii (FLORASTOR) 250 mg capsule Take 1 capsule (250 mg total) by mouth 2 (two) times a day     No current facility-administered medications on file prior to visit  She is allergic to hydrocodone; morphine; morphine and related; oxycodone; penicillins; clomid [clomiphene]; and sulfa antibiotics       Review of Systems   Constitutional: Positive for activity change, appetite change, fatigue and unexpected weight change  Negative for chills and fever  HENT: Negative for congestion and rhinorrhea  Eyes: Negative for visual disturbance  Respiratory: Negative for cough, chest tightness and shortness of breath  Cardiovascular: Negative for chest pain and palpitations  Gastrointestinal: Negative for abdominal pain, blood in stool, nausea and vomiting  As per HPI   Endocrine: Negative for polydipsia, polyphagia and polyuria  Genitourinary: Negative for dysuria, frequency and urgency  As per HPI   Musculoskeletal: Negative for gait problem  Skin: Negative for color change  Neurological: Negative for dizziness and headaches  Hematological: Does not bruise/bleed easily  Psychiatric/Behavioral: Negative for confusion and sleep disturbance  The patient is not nervous/anxious  As per HPI         Objective:      /64 (BP Location: Left arm, Patient Position: Sitting, Cuff Size: Standard)   Pulse 79   Temp (!) 97 4 °F (36 3 °C) (Temporal)   Ht 5' 8" (1 727 m)   Wt 57 8 kg (127 lb 6 4 oz)   SpO2 99%   BMI 19 37 kg/m²          Physical Exam  Vitals signs and nursing note reviewed  Constitutional:       General: She is not in acute distress  Appearance: She is well-developed and well-groomed     HENT:      Head: Normocephalic and atraumatic  Eyes:      General:         Right eye: No discharge  Left eye: No discharge  Conjunctiva/sclera: Conjunctivae normal       Pupils: Pupils are equal, round, and reactive to light  Cardiovascular:      Rate and Rhythm: Normal rate and regular rhythm  Heart sounds: Normal heart sounds  No murmur  No friction rub  No gallop  Pulmonary:      Effort: No respiratory distress  Breath sounds: No wheezing or rales  Abdominal:      General: Bowel sounds are increased  There is no distension  Tenderness: There is no abdominal tenderness  Lymphadenopathy:      Cervical: No cervical adenopathy  Skin:     General: Skin is warm and dry  Neurological:      Mental Status: She is alert and oriented to person, place, and time  Psychiatric:         Mood and Affect: Mood and affect normal          Speech: Speech normal          Behavior: Behavior normal  Behavior is cooperative  Below is the patient's most recent value for Albumin, ALT, AST, BUN, Calcium, Chloride, Cholesterol, CO2, Creatinine, GFR, Glucose, HDL, Hematocrit, Hemoglobin, Hemoglobin A1C, LDL, Magnesium, Phosphorus, Platelets, Potassium, PSA, Sodium, Triglycerides, and WBC  Lab Results   Component Value Date    ALT 9 01/13/2020    AST 11 (L) 01/13/2020    BUN 7 09/21/2020    CALCIUM 9 1 09/21/2020     09/21/2020    CO2 25 09/21/2020    CREATININE 0 88 09/21/2020    HDL 43 06/07/2018    HCT 38 4 (L) 01/14/2020    HGB 12 7 01/14/2020    HGBA1C 5 1 06/07/2018    MG 2 1 11/21/2019     01/14/2020    K 3 7 09/21/2020    TRIG 134 06/07/2018    WBC 10 10 01/14/2020     Note: for a comprehensive list of the patient's lab results, access the Results Review activity

## 2021-03-23 ENCOUNTER — APPOINTMENT (OUTPATIENT)
Dept: LAB | Facility: CLINIC | Age: 41
End: 2021-03-23
Payer: COMMERCIAL

## 2021-03-23 ENCOUNTER — TELEPHONE (OUTPATIENT)
Dept: INTERNAL MEDICINE CLINIC | Facility: CLINIC | Age: 41
End: 2021-03-23

## 2021-03-23 ENCOUNTER — OFFICE VISIT (OUTPATIENT)
Dept: INTERNAL MEDICINE CLINIC | Facility: CLINIC | Age: 41
End: 2021-03-23
Payer: COMMERCIAL

## 2021-03-23 VITALS
HEIGHT: 68 IN | BODY MASS INDEX: 19.31 KG/M2 | SYSTOLIC BLOOD PRESSURE: 110 MMHG | WEIGHT: 127.4 LBS | DIASTOLIC BLOOD PRESSURE: 64 MMHG | HEART RATE: 79 BPM | TEMPERATURE: 97.4 F | OXYGEN SATURATION: 99 %

## 2021-03-23 DIAGNOSIS — A04.71 RECURRENT CLOSTRIDIUM DIFFICILE DIARRHEA: ICD-10-CM

## 2021-03-23 DIAGNOSIS — E78.5 HYPERLIPIDEMIA, UNSPECIFIED HYPERLIPIDEMIA TYPE: ICD-10-CM

## 2021-03-23 DIAGNOSIS — Z20.822 EXPOSURE TO COVID-19 VIRUS: ICD-10-CM

## 2021-03-23 DIAGNOSIS — R63.4 EXCESSIVE WEIGHT LOSS: ICD-10-CM

## 2021-03-23 DIAGNOSIS — G43.909 MIGRAINE WITHOUT STATUS MIGRAINOSUS, NOT INTRACTABLE, UNSPECIFIED MIGRAINE TYPE: ICD-10-CM

## 2021-03-23 DIAGNOSIS — A04.71 RECURRENT CLOSTRIDIUM DIFFICILE DIARRHEA: Primary | ICD-10-CM

## 2021-03-23 DIAGNOSIS — Z92.89: ICD-10-CM

## 2021-03-23 LAB
ALBUMIN SERPL BCP-MCNC: 4.3 G/DL (ref 3.5–5)
ALP SERPL-CCNC: 81 U/L (ref 46–116)
ALT SERPL W P-5'-P-CCNC: 13 U/L (ref 12–78)
ANION GAP SERPL CALCULATED.3IONS-SCNC: 7 MMOL/L (ref 4–13)
AST SERPL W P-5'-P-CCNC: 9 U/L (ref 5–45)
BASOPHILS # BLD AUTO: 0.05 THOUSANDS/ΜL (ref 0–0.1)
BASOPHILS NFR BLD AUTO: 1 % (ref 0–1)
BILIRUB SERPL-MCNC: 0.98 MG/DL (ref 0.2–1)
BUN SERPL-MCNC: 7 MG/DL (ref 5–25)
CALCIUM SERPL-MCNC: 9.1 MG/DL (ref 8.3–10.1)
CHLORIDE SERPL-SCNC: 106 MMOL/L (ref 100–108)
CHOLEST SERPL-MCNC: 164 MG/DL (ref 50–200)
CO2 SERPL-SCNC: 25 MMOL/L (ref 21–32)
CREAT SERPL-MCNC: 0.9 MG/DL (ref 0.6–1.3)
EOSINOPHIL # BLD AUTO: 0.06 THOUSAND/ΜL (ref 0–0.61)
EOSINOPHIL NFR BLD AUTO: 1 % (ref 0–6)
ERYTHROCYTE [DISTWIDTH] IN BLOOD BY AUTOMATED COUNT: 12.9 % (ref 11.6–15.1)
GFR SERPL CREATININE-BSD FRML MDRD: 81 ML/MIN/1.73SQ M
GLUCOSE P FAST SERPL-MCNC: 99 MG/DL (ref 65–99)
HCT VFR BLD AUTO: 41.7 % (ref 34.8–46.1)
HDLC SERPL-MCNC: 45 MG/DL
HGB BLD-MCNC: 13.7 G/DL (ref 11.5–15.4)
IMM GRANULOCYTES # BLD AUTO: 0.03 THOUSAND/UL (ref 0–0.2)
IMM GRANULOCYTES NFR BLD AUTO: 0 % (ref 0–2)
LDLC SERPL CALC-MCNC: 98 MG/DL (ref 0–100)
LYMPHOCYTES # BLD AUTO: 2.69 THOUSANDS/ΜL (ref 0.6–4.47)
LYMPHOCYTES NFR BLD AUTO: 24 % (ref 14–44)
MCH RBC QN AUTO: 30.2 PG (ref 26.8–34.3)
MCHC RBC AUTO-ENTMCNC: 32.9 G/DL (ref 31.4–37.4)
MCV RBC AUTO: 92 FL (ref 82–98)
MONOCYTES # BLD AUTO: 0.8 THOUSAND/ΜL (ref 0.17–1.22)
MONOCYTES NFR BLD AUTO: 7 % (ref 4–12)
NEUTROPHILS # BLD AUTO: 7.45 THOUSANDS/ΜL (ref 1.85–7.62)
NEUTS SEG NFR BLD AUTO: 67 % (ref 43–75)
NONHDLC SERPL-MCNC: 119 MG/DL
NRBC BLD AUTO-RTO: 0 /100 WBCS
PLATELET # BLD AUTO: 218 THOUSANDS/UL (ref 149–390)
PMV BLD AUTO: 10.1 FL (ref 8.9–12.7)
POTASSIUM SERPL-SCNC: 4.5 MMOL/L (ref 3.5–5.3)
PROT SERPL-MCNC: 7.4 G/DL (ref 6.4–8.2)
RBC # BLD AUTO: 4.53 MILLION/UL (ref 3.81–5.12)
SODIUM SERPL-SCNC: 138 MMOL/L (ref 136–145)
TRIGL SERPL-MCNC: 103 MG/DL
TSH SERPL DL<=0.05 MIU/L-ACNC: 2.33 UIU/ML (ref 0.36–3.74)
WBC # BLD AUTO: 11.08 THOUSAND/UL (ref 4.31–10.16)

## 2021-03-23 PROCEDURE — 80061 LIPID PANEL: CPT

## 2021-03-23 PROCEDURE — 99214 OFFICE O/P EST MOD 30 MIN: CPT | Performed by: FAMILY MEDICINE

## 2021-03-23 PROCEDURE — 85025 COMPLETE CBC W/AUTO DIFF WBC: CPT

## 2021-03-23 PROCEDURE — 80053 COMPREHEN METABOLIC PANEL: CPT

## 2021-03-23 PROCEDURE — 84443 ASSAY THYROID STIM HORMONE: CPT

## 2021-03-23 PROCEDURE — 86769 SARS-COV-2 COVID-19 ANTIBODY: CPT

## 2021-03-23 PROCEDURE — 36415 COLL VENOUS BLD VENIPUNCTURE: CPT

## 2021-03-23 NOTE — TELEPHONE ENCOUNTER
Patient called- she said you and her discussed the covid antibody testing  Can you put those orders in for her?

## 2021-03-24 LAB — SARS-COV-2 IGG+IGM SERPL QL IA: NORMAL

## 2021-03-29 ENCOUNTER — IMMUNIZATIONS (OUTPATIENT)
Dept: FAMILY MEDICINE CLINIC | Facility: HOSPITAL | Age: 41
End: 2021-03-29

## 2021-03-29 DIAGNOSIS — Z23 ENCOUNTER FOR IMMUNIZATION: Primary | ICD-10-CM

## 2021-03-29 PROCEDURE — 0011A SARS-COV-2 / COVID-19 MRNA VACCINE (MODERNA) 100 MCG: CPT

## 2021-03-29 PROCEDURE — 91301 SARS-COV-2 / COVID-19 MRNA VACCINE (MODERNA) 100 MCG: CPT

## 2021-03-30 ENCOUNTER — OFFICE VISIT (OUTPATIENT)
Dept: GASTROENTEROLOGY | Facility: CLINIC | Age: 41
End: 2021-03-30
Payer: COMMERCIAL

## 2021-03-30 VITALS
HEIGHT: 68 IN | HEART RATE: 68 BPM | WEIGHT: 126.4 LBS | DIASTOLIC BLOOD PRESSURE: 68 MMHG | SYSTOLIC BLOOD PRESSURE: 112 MMHG | BODY MASS INDEX: 19.16 KG/M2

## 2021-03-30 DIAGNOSIS — K58.0 IRRITABLE BOWEL SYNDROME WITH DIARRHEA: Primary | ICD-10-CM

## 2021-03-30 PROCEDURE — 99214 OFFICE O/P EST MOD 30 MIN: CPT | Performed by: INTERNAL MEDICINE

## 2021-03-30 RX ORDER — CHOLESTYRAMINE 4 G/9G
1 POWDER, FOR SUSPENSION ORAL
COMMUNITY
End: 2022-06-16

## 2021-03-30 RX ORDER — DICYCLOMINE HCL 20 MG
20 TABLET ORAL 3 TIMES DAILY PRN
Qty: 60 TABLET | Refills: 0 | Status: SHIPPED | OUTPATIENT
Start: 2021-03-30 | End: 2022-06-16

## 2021-03-30 NOTE — PROGRESS NOTES
Follow-up Note -  [facility]  Nile Cancer 1980 47 y o  female     ASSESSMENT @ PLAN:     She is a  56-year-old female with recurrent Clostridium difficile colitis status post fecal microbiology transplant who is doing great  She  Still has some post infectious IBS symptoms but they are much less infrequent and she realizes that anxiety plays a component  1 she will use the cholestyramine as needed    2 I am giving her dicyclomine to take as needed for relief of abdominal pain    Reason:  Abdominal pain and diarrhea and recurrent C diff    HPI:  She is a 56-year-old female with recurrent Clostridium diff difficile colitis  She is 8 weeks after fecal microbiologic transplant is doing very well  She is having more regular bowel movements and is not having severe abdominal pain  She still has the symptoms though they are infrequent  She tells me that they are related to her menstrual cycle and a 2 day 3  She is not doing the cholestyramine  She is off vitamin supplements and off probiotics and is really doing very well  She recognizes that anxiety plays a role and sometimes this exacerbates her symptoms  She is very pleased with her results  She is aware that she was traumatized by her recurrent C diff episodes and she is working through moving on  She has no melena hematochezia her weight is stable  She traveled and went to [place] it we talked about this a little bit  REVIEW OF SYSTEMS:    CONSTITUTIONAL: Denies any fever, chills, or rigors  Good appetite, and no recent weight loss  HEENT: No earache or tinnitus  Denies hearing loss or visual disturbances  CARDIOVASCULAR: No chest pain or palpitations  RESPIRATORY: Denies any cough, hemoptysis, shortness of breath or dyspnea on exertion  GASTROINTESTINAL: As noted in the History of Present Illness  GENITOURINARY: No problems with urination  Denies any hematuria or dysuria    NEUROLOGIC: No dizziness or vertigo, denies headaches  MUSCULOSKELETAL: Denies any muscle or joint pain  SKIN: Denies skin rashes or itching  ENDOCRINE: Denies excessive thirst  Denies intolerance to heat or cold  PSYCHOSOCIAL: Denies depression or anxiety  Denies any recent memory loss       Past Medical History:   Diagnosis Date    Migraine     Ovarian cyst     Palpitations     Renal disorder     acute kidney injury    Tachycardia     Uterine fibroid     Uterine leiomyoma     Last assessed 8/28/2017      Past Surgical History:   Procedure Laterality Date    APPENDECTOMY      CERVICAL BIOPSY  W/ LOOP ELECTRODE EXCISION      Last assessed 8/28/2017    COLONOSCOPY      COLPOSCOPY      DENTAL SURGERY      DILATION AND CURETTAGE OF UTERUS      Last assessed 8/28/2017    ESOPHAGOGASTRODUODENOSCOPY      OVARIAN CYST REMOVAL      Last assessed 8/28/2017    TONSILLECTOMY       Social History     Socioeconomic History    Marital status: /Civil Union     Spouse name: Not on file    Number of children: Not on file    Years of education: Not on file    Highest education level: Not on file   Occupational History    Occupation: Nurse     Employer: [employer] EMPLOYEES   Social Needs    Financial resource strain: Not on file    Food insecurity     Worry: Not on file     Inability: Not on file    Transportation needs     Medical: Not on file     Non-medical: Not on file   Tobacco Use    Smoking status: Never Smoker    Smokeless tobacco: Never Used   Substance and Sexual Activity    Alcohol use: Never     Frequency: Never    Drug use: No    Sexual activity: Yes   Lifestyle    Physical activity     Days per week: Not on file     Minutes per session: Not on file    Stress: Not on file   Relationships    Social connections     Talks on phone: Not on file     Gets together: Not on file     Attends Hoahaoism service: Not on file     Active member of club or organization: Not on file     Attends meetings of clubs or organizations: Not on file     Relationship status: Not on file    Intimate partner violence     Fear of current or ex partner: Not on file     Emotionally abused: Not on file     Physically abused: Not on file     Forced sexual activity: Not on file   Other Topics Concern    Not on file   Social History Narrative    Not on file     Family History   Problem Relation Age of Onset    Other Mother         Cardiac Disorder    Heart attack Mother     Arrhythmia Mother     Coronary artery disease Mother     Hyperlipidemia Mother     Aortic aneurysm Father         Abdomincal aortic aneurysm    Hypertension Father     Clotting disorder Father     Cerebral aneurysm Family     Heart failure Paternal Grandmother     Stroke Neg Hx     Cancer Neg Hx      Hydrocodone, Morphine, Morphine and related, Oxycodone, Penicillins, Clomid [clomiphene], and Sulfa antibiotics  Current Outpatient Medications   Medication Sig Dispense Refill    cholestyramine (QUESTRAN) 4 g packet Take 1 packet by mouth 3 (three) times a day with meals      SUMAtriptan (Imitrex) 100 mg tablet Take 1 tablet (100 mg total) by mouth once as needed for migraine for up to 1 dose Up to 2 doses per migraine as needed 9 tablet 1    dicyclomine (BENTYL) 20 mg tablet Take 1 tablet (20 mg total) by mouth 3 (three) times a day as needed (abdominal pain) 60 tablet 0     No current facility-administered medications for this visit  Blood pressure 112/68, pulse 68, height 5' 8" (1 727 m), weight 57 3 kg (126 lb 6 4 oz)      PHYSICAL EXAM:     General Appearance:   Alert, cooperative, no distress, appears stated age    HEENT:   Normocephalic, atraumatic, anicteric      Neck:  Supple, symmetrical, trachea midline, no adenopathy;    thyroid: no enlargement/tenderness/nodules; no carotid  bruit or JVD    Lungs:   Clear to auscultation bilaterally; no rales, rhonchi or wheezing; respirations unlabored    Heart[de-identified]   S1 and S2 normal; regular rate and rhythm; no murmur, rub, or gallop     Abdomen:   Soft, non-tender, non-distended; normal bowel sounds; no masses, no organomegaly    Genitalia:   Deferred    Rectal:   Deferred    Extremities:  No cyanosis, clubbing or edema    Pulses:  2+ and symmetric all extremities    Skin:  Skin color, texture, turgor normal, no rashes or lesions    Lymph nodes:  No palpable cervical, axillary or inguinal lymphadenopathy        Lab Results   Component Value Date    WBC 11 08 (H) 03/23/2021    HGB 13 7 03/23/2021    HCT 41 7 03/23/2021    MCV 92 03/23/2021     03/23/2021     Lab Results   Component Value Date    CALCIUM 9 1 03/23/2021    K 4 5 03/23/2021    CO2 25 03/23/2021     03/23/2021    BUN 7 03/23/2021    CREATININE 0 90 03/23/2021     Lab Results   Component Value Date    ALT 13 03/23/2021    AST 9 03/23/2021    ALKPHOS 81 03/23/2021     No results found for: INR, PROTIME

## 2021-04-26 ENCOUNTER — IMMUNIZATIONS (OUTPATIENT)
Dept: FAMILY MEDICINE CLINIC | Facility: HOSPITAL | Age: 41
End: 2021-04-26

## 2021-04-26 DIAGNOSIS — Z23 ENCOUNTER FOR IMMUNIZATION: Primary | ICD-10-CM

## 2021-04-26 PROCEDURE — 0012A SARS-COV-2 / COVID-19 MRNA VACCINE (MODERNA) 100 MCG: CPT

## 2021-04-26 PROCEDURE — 91301 SARS-COV-2 / COVID-19 MRNA VACCINE (MODERNA) 100 MCG: CPT

## 2021-08-20 ENCOUNTER — APPOINTMENT (OUTPATIENT)
Dept: LAB | Facility: CLINIC | Age: 41
End: 2021-08-20

## 2021-08-20 DIAGNOSIS — Z00.8 HEALTH EXAMINATION IN POPULATION SURVEY: ICD-10-CM

## 2021-08-20 LAB
CHOLEST SERPL-MCNC: 156 MG/DL (ref 50–200)
EST. AVERAGE GLUCOSE BLD GHB EST-MCNC: 100 MG/DL
HBA1C MFR BLD: 5.1 %
HDLC SERPL-MCNC: 47 MG/DL
LDLC SERPL CALC-MCNC: 90 MG/DL (ref 0–100)
NONHDLC SERPL-MCNC: 109 MG/DL
TRIGL SERPL-MCNC: 93 MG/DL

## 2021-08-20 PROCEDURE — 80061 LIPID PANEL: CPT

## 2021-08-20 PROCEDURE — 36415 COLL VENOUS BLD VENIPUNCTURE: CPT

## 2021-08-20 PROCEDURE — 83036 HEMOGLOBIN GLYCOSYLATED A1C: CPT

## 2021-08-26 ENCOUNTER — OFFICE VISIT (OUTPATIENT)
Dept: INTERNAL MEDICINE CLINIC | Facility: CLINIC | Age: 41
End: 2021-08-26
Payer: COMMERCIAL

## 2021-08-26 DIAGNOSIS — D72.829 LEUKOCYTOSIS, UNSPECIFIED TYPE: ICD-10-CM

## 2021-08-26 DIAGNOSIS — R22.2 LUMP IN CHEST: ICD-10-CM

## 2021-08-26 DIAGNOSIS — B37.0 ORAL THRUSH: Primary | ICD-10-CM

## 2021-08-26 DIAGNOSIS — G43.909 MIGRAINE: ICD-10-CM

## 2021-08-26 PROCEDURE — 99213 OFFICE O/P EST LOW 20 MIN: CPT | Performed by: FAMILY MEDICINE

## 2021-08-27 ENCOUNTER — APPOINTMENT (OUTPATIENT)
Dept: LAB | Facility: CLINIC | Age: 41
End: 2021-08-27
Payer: COMMERCIAL

## 2021-08-27 DIAGNOSIS — B37.0 ORAL THRUSH: ICD-10-CM

## 2021-08-27 DIAGNOSIS — D72.829 LEUKOCYTOSIS, UNSPECIFIED TYPE: ICD-10-CM

## 2021-08-27 DIAGNOSIS — R22.2 LUMP IN CHEST: ICD-10-CM

## 2021-08-27 LAB
BASOPHILS # BLD AUTO: 0.04 THOUSANDS/ΜL (ref 0–0.1)
BASOPHILS NFR BLD AUTO: 0 % (ref 0–1)
EOSINOPHIL # BLD AUTO: 0.08 THOUSAND/ΜL (ref 0–0.61)
EOSINOPHIL NFR BLD AUTO: 1 % (ref 0–6)
ERYTHROCYTE [DISTWIDTH] IN BLOOD BY AUTOMATED COUNT: 12.6 % (ref 11.6–15.1)
HCT VFR BLD AUTO: 42.1 % (ref 34.8–46.1)
HGB BLD-MCNC: 13.8 G/DL (ref 11.5–15.4)
IMM GRANULOCYTES # BLD AUTO: 0.03 THOUSAND/UL (ref 0–0.2)
IMM GRANULOCYTES NFR BLD AUTO: 0 % (ref 0–2)
LYMPHOCYTES # BLD AUTO: 2.89 THOUSANDS/ΜL (ref 0.6–4.47)
LYMPHOCYTES NFR BLD AUTO: 31 % (ref 14–44)
MCH RBC QN AUTO: 30.5 PG (ref 26.8–34.3)
MCHC RBC AUTO-ENTMCNC: 32.8 G/DL (ref 31.4–37.4)
MCV RBC AUTO: 93 FL (ref 82–98)
MONOCYTES # BLD AUTO: 0.58 THOUSAND/ΜL (ref 0.17–1.22)
MONOCYTES NFR BLD AUTO: 6 % (ref 4–12)
NEUTROPHILS # BLD AUTO: 5.78 THOUSANDS/ΜL (ref 1.85–7.62)
NEUTS SEG NFR BLD AUTO: 62 % (ref 43–75)
NRBC BLD AUTO-RTO: 0 /100 WBCS
PLATELET # BLD AUTO: 261 THOUSANDS/UL (ref 149–390)
PMV BLD AUTO: 10.3 FL (ref 8.9–12.7)
RBC # BLD AUTO: 4.53 MILLION/UL (ref 3.81–5.12)
WBC # BLD AUTO: 9.4 THOUSAND/UL (ref 4.31–10.16)

## 2021-08-27 PROCEDURE — 36415 COLL VENOUS BLD VENIPUNCTURE: CPT

## 2021-08-27 PROCEDURE — 85025 COMPLETE CBC W/AUTO DIFF WBC: CPT

## 2021-08-27 RX ORDER — SUMATRIPTAN 100 MG/1
100 TABLET, FILM COATED ORAL ONCE AS NEEDED
Qty: 9 TABLET | Refills: 1 | Status: SHIPPED | OUTPATIENT
Start: 2021-08-27

## 2021-08-27 NOTE — PROGRESS NOTES
Assessment/Plan:    No problem-specific Assessment & Plan notes found for this encounter  Diagnoses and all orders for this visit:    Oral thrush  -     CBC and differential; Future  -     Fungal culture; Future    Lump in chest  -     CBC and differential; Future  -     US chest (lungs/pleural cavity); Future    Leukocytosis, unspecified type  -     CBC and differential; Future  -     US chest (lungs/pleural cavity); Future    Migraine  -     SUMAtriptan (Imitrex) 100 mg tablet; Take 1 tablet (100 mg total) by mouth once as needed for migraine for up to 1 dose Up to 2 doses per migraine as needed     Orders and recommendations as noted above  She had been told in the past to get culture of the areas on her tongue when they are exacerbated  Currently not bad  Order for fungal culture given  She will obtain the swab with the next exacerbation and we will decide on treatment thereafter  Lump on the right upper chest area is concerning  Will get ultrasound for further investigation  Have low theshold to refer to surgeon for biopsy or removal depending on the results of the ultrasound  Will recheck laboratory testing especially with her history of the elevated WBC  Will have her follow-up after testing depending on the results  Subjective:      Patient ID: Juanjose Rodriguez is a 47 y o  female  She presents for problem visit  She has had issues with recurrent vaginal and oral yeast infections in the past   Recenly more issues with her mouth  Mouth will become sore and have a very thick coating of white mostly on the tongue  Will also have some soreness and difficulty with swallowing  Had resolved with diflucan in the past but had been trying to avoid any additional medications with the GI issues over the last few years  She had been told by specialists in the past to obtain culture when exacerbated  Currently, however, not bad    She also is concerned about a lump over the chest wall anteriorly on the right  Has been there a few years  Now larger again and more tender  Not movable when she touches area  She is also concerned about recurrent issues with elevated WBC  Gi issues have been recently stable  Denies any diarrhea recently  Denies any fevers or chills  The following portions of the patient's history were reviewed and updated as appropriate:   She  has a past medical history of Migraine, Ovarian cyst, Palpitations, Renal disorder, Tachycardia, Uterine fibroid, and Uterine leiomyoma  She   Patient Active Problem List    Diagnosis Date Noted    Lump in chest 08/26/2021    Oral thrush 08/26/2021    Elevated WBC count 08/26/2021    Irritable bowel syndrome with diarrhea 03/30/2021    Hyperlipidemia 03/23/2021    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    Excessive weight loss 03/23/2021    Recurrent Clostridium difficile diarrhea 01/13/2020    Increased stomach acid 02/01/2019    Malaise and fatigue 10/01/2018    History of migraine 09/27/2018    Cyst of right ovary 09/15/2017    Bronchospasm, exercise-induced 08/28/2017    Cervical dysplasia 08/28/2017    Chronic diarrhea 08/28/2017    Migraines 08/28/2017    Pain of plantar aspect of heel 06/08/2017     She  has a past surgical history that includes Tonsillectomy; Appendectomy; Cervical biopsy w/ loop electrode excision; Colonoscopy; Dental surgery; Esophagogastroduodenoscopy; Dilation and curettage of uterus; Ovarian cyst removal; and Colposcopy  Her family history includes Aortic aneurysm in her father; Arrhythmia in her mother; Cerebral aneurysm in her family; Clotting disorder in her father; Coronary artery disease in her mother; Heart attack in her mother; Heart failure in her paternal grandmother; Hyperlipidemia in her mother; Hypertension in her father; Other in her mother  She  reports that she has never smoked   She has never used smokeless tobacco  She reports that she does not drink alcohol and does not use drugs  Current Outpatient Medications   Medication Sig Dispense Refill    cholestyramine (QUESTRAN) 4 g packet Take 1 packet by mouth 3 (three) times a day with meals      dicyclomine (BENTYL) 20 mg tablet Take 1 tablet (20 mg total) by mouth 3 (three) times a day as needed (abdominal pain) 60 tablet 0    SUMAtriptan (Imitrex) 100 mg tablet Take 1 tablet (100 mg total) by mouth once as needed for migraine for up to 1 dose Up to 2 doses per migraine as needed 9 tablet 1     No current facility-administered medications for this visit  Current Outpatient Medications on File Prior to Visit   Medication Sig    cholestyramine (QUESTRAN) 4 g packet Take 1 packet by mouth 3 (three) times a day with meals    dicyclomine (BENTYL) 20 mg tablet Take 1 tablet (20 mg total) by mouth 3 (three) times a day as needed (abdominal pain)     No current facility-administered medications on file prior to visit  She is allergic to hydrocodone, morphine, morphine and related, oxycodone, penicillins, clomid [clomiphene], and sulfa antibiotics       Review of Systems   Constitutional: Positive for fatigue  Negative for activity change, appetite change, chills and fever  HENT: Positive for mouth sores, sore throat and trouble swallowing  Respiratory: Negative for shortness of breath  Cardiovascular: Negative for chest pain and palpitations  Gastrointestinal: Negative for diarrhea  Skin:        As per HPI         Objective: There were no vitals taken for this visit  Physical Exam  Vitals and nursing note reviewed  Constitutional:       Appearance: Normal appearance  HENT:      Head:      Comments: Tongue with areas of erythema and other areas of thick white patches; no areas on the buccal mucosa  Chest:      Comments: Palpable about 2cm lump over the right upper chest wall; not movable; slightly tender to palpable  Neurological:      Mental Status: She is alert     Psychiatric: Behavior: Behavior is cooperative

## 2021-08-30 ENCOUNTER — OFFICE VISIT (OUTPATIENT)
Dept: GYNECOLOGY | Facility: CLINIC | Age: 41
End: 2021-08-30
Payer: COMMERCIAL

## 2021-08-30 VITALS
DIASTOLIC BLOOD PRESSURE: 78 MMHG | HEIGHT: 68 IN | WEIGHT: 123.2 LBS | BODY MASS INDEX: 18.67 KG/M2 | SYSTOLIC BLOOD PRESSURE: 122 MMHG

## 2021-08-30 DIAGNOSIS — N76.3 CHRONIC VULVITIS: ICD-10-CM

## 2021-08-30 DIAGNOSIS — N93.9 ABNORMAL UTERINE BLEEDING (AUB): Primary | ICD-10-CM

## 2021-08-30 PROCEDURE — 87480 CANDIDA DNA DIR PROBE: CPT | Performed by: OBSTETRICS & GYNECOLOGY

## 2021-08-30 PROCEDURE — 87660 TRICHOMONAS VAGIN DIR PROBE: CPT | Performed by: OBSTETRICS & GYNECOLOGY

## 2021-08-30 PROCEDURE — 87510 GARDNER VAG DNA DIR PROBE: CPT | Performed by: OBSTETRICS & GYNECOLOGY

## 2021-08-30 PROCEDURE — 99203 OFFICE O/P NEW LOW 30 MIN: CPT | Performed by: OBSTETRICS & GYNECOLOGY

## 2021-08-30 NOTE — PROGRESS NOTES
Assessment/Plan:    Will schedule TVS/SIS/EMB  Discussed treatment options, but given desire for fertility she is aware that options are limited  Also discussed referral to infertility given history  Given reported hx of chronic yeast, vaginal culture obtained  Will treat based on culture  Patient advised to call in the future with any sx prior to treatment  Diagnoses and all orders for this visit:    Abnormal uterine bleeding (AUB)    Chronic vulvitis        Subjective:      Patient ID: Juanjose Rodriguez is a 47 y o  female  New patient presents for AUB  Patient reports heavy bleeding with clots for 7 days for the last year and a half  Prior to this she had very light bleeding for about 3 days  CBC on 8/27/2, H/H 13 8/42  1  TSH normal on 3/23/21  Patient feels heavy menses started around the time she was diagnosed with refractory C diff and states she was told it was because of the insult to her body and her menses would regulate after infection cleared  Of note, she and her  have been actively trying to conceive since the resolution of the C diff infection 6 months ago  He had a vasectomy reversal 2 years ago  He has fathered a daughter  Patient states she was in a previous 10 year relationship and had infertility testing at that time and no cause was discovered  Patient also reports a hx of recurrent yeast infections  Today her sx are vaginal itching and discharge  She used monistat 2 days ago  She is also treating for oral thrush  The following portions of the patient's history were reviewed and updated as appropriate: allergies, current medications, past family history, past medical history, past social history, past surgical history and problem list     Review of Systems   Constitutional: Negative  HENT: Negative  Respiratory: Negative  Cardiovascular: Negative  Gastrointestinal: Negative  Endocrine: Negative      Genitourinary: Positive for dyspareunia ("when a yeast infection is starting") and menstrual problem  Negative for difficulty urinating, dysuria, frequency, pelvic pain, urgency, vaginal bleeding, vaginal discharge and vaginal pain  Musculoskeletal: Negative  Skin: Negative  Neurological: Negative  Psychiatric/Behavioral: Negative  Objective:      /78   Ht 5' 8" (1 727 m)   Wt 55 9 kg (123 lb 3 2 oz)   LMP 08/20/2021   BMI 18 73 kg/m²          Physical Exam  Vitals and nursing note reviewed  Exam conducted with a chaperone present  Constitutional:       Appearance: Normal appearance  She is normal weight  HENT:      Head: Normocephalic and atraumatic  Pulmonary:      Effort: Pulmonary effort is normal    Abdominal:      General: Abdomen is flat  Palpations: Abdomen is soft  There is no mass  Tenderness: There is no abdominal tenderness  Hernia: There is no hernia in the left inguinal area or right inguinal area  Genitourinary:     General: Normal vulva  Exam position: Supine  Pubic Area: No rash  Labia:         Right: No rash, tenderness, lesion or injury  Left: No rash, tenderness, lesion or injury  Urethra: No urethral pain, urethral swelling or urethral lesion  Vagina: Normal  No signs of injury and foreign body  No vaginal discharge, erythema, tenderness, bleeding, lesions or prolapsed vaginal walls  Musculoskeletal:         General: Normal range of motion  Cervical back: Normal range of motion  Lymphadenopathy:      Lower Body: No right inguinal adenopathy  No left inguinal adenopathy  Skin:     General: Skin is warm and dry  Neurological:      Mental Status: She is alert and oriented to person, place, and time  Psychiatric:         Mood and Affect: Mood normal          Behavior: Behavior normal          Thought Content:  Thought content normal          Judgment: Judgment normal

## 2021-08-31 ENCOUNTER — APPOINTMENT (OUTPATIENT)
Dept: LAB | Facility: CLINIC | Age: 41
End: 2021-08-31
Payer: COMMERCIAL

## 2021-08-31 DIAGNOSIS — B37.0 ORAL THRUSH: ICD-10-CM

## 2021-08-31 PROCEDURE — 87102 FUNGUS ISOLATION CULTURE: CPT

## 2021-08-31 PROCEDURE — 87106 FUNGI IDENTIFICATION YEAST: CPT

## 2021-09-01 ENCOUNTER — HOSPITAL ENCOUNTER (OUTPATIENT)
Dept: ULTRASOUND IMAGING | Facility: HOSPITAL | Age: 41
Discharge: HOME | End: 2021-09-01
Payer: COMMERCIAL

## 2021-09-01 DIAGNOSIS — R22.2 LUMP IN CHEST: ICD-10-CM

## 2021-09-01 DIAGNOSIS — D72.829 LEUKOCYTOSIS, UNSPECIFIED TYPE: ICD-10-CM

## 2021-09-01 PROCEDURE — 76604 US EXAM CHEST: CPT

## 2021-09-07 LAB — FUNGUS SPEC CULT: ABNORMAL

## 2021-09-08 ENCOUNTER — TELEPHONE (OUTPATIENT)
Dept: GASTROENTEROLOGY | Facility: CLINIC | Age: 41
End: 2021-09-08

## 2021-09-08 NOTE — TELEPHONE ENCOUNTER
Diflucan is an antifungal so it should not increase the risk of Cdiff since it is not an antibiotic

## 2021-09-08 NOTE — TELEPHONE ENCOUNTER
Dr Jaylene Dodd - patient called had a colonoscopy Fecal Transplant on 01/08/21  It has been fabulous  Patient however, has a yeast infection  She is treating the vaginal area with cream, It is the mouth that patient is having issues with  Patient wanted to know if it would be alright to have her PCP prescribe Diflucan  Patient is concerned due to having C-Diff for over a year  Wanted to know what Dr Jaylene Dodd thinks   Please call Lexa Bueno at [phone]

## 2021-09-09 ENCOUNTER — TELEPHONE (OUTPATIENT)
Dept: GASTROENTEROLOGY | Facility: CLINIC | Age: 41
End: 2021-09-09

## 2021-09-09 ENCOUNTER — TELEPHONE (OUTPATIENT)
Dept: INTERNAL MEDICINE CLINIC | Facility: CLINIC | Age: 41
End: 2021-09-09

## 2021-09-09 DIAGNOSIS — B37.9 YEAST INFECTION: Primary | ICD-10-CM

## 2021-09-09 RX ORDER — FLUCONAZOLE 150 MG/1
150 TABLET ORAL ONCE
Qty: 1 TABLET | Refills: 0 | Status: SHIPPED | OUTPATIENT
Start: 2021-09-09 | End: 2021-09-09

## 2021-09-09 NOTE — TELEPHONE ENCOUNTER
Dr Lindsay Feldman - patient called she is anxious to get an ansser regarding antibiotic   Please call Mar at [phone]

## 2021-09-09 NOTE — TELEPHONE ENCOUNTER
----- Message from [id number] [place] sent at 9/9/2021 10:40 AM EDT -----  Regarding: Non-Urgent Medical Question  Contact: [phone]  Hi Dr Corrina Martinez,      I had a FMT in January and Abelardo Danita been doing great  I recently started with issues with yeast   I had a culture completed for my mouth and it is fungal  Im concerned with taking diflucan  Is this something I can consider?     Thank you  Royce Castellanos

## 2021-09-13 ENCOUNTER — TELEPHONE (OUTPATIENT)
Dept: URGENT CARE | Facility: CLINIC | Age: 41
End: 2021-09-13

## 2021-09-13 DIAGNOSIS — B34.9 VIRAL SYNDROME: Primary | ICD-10-CM

## 2021-09-13 PROCEDURE — U0005 INFEC AGEN DETEC AMPLI PROBE: HCPCS | Performed by: PHYSICIAN ASSISTANT

## 2021-09-13 PROCEDURE — U0003 INFECTIOUS AGENT DETECTION BY NUCLEIC ACID (DNA OR RNA); SEVERE ACUTE RESPIRATORY SYNDROME CORONAVIRUS 2 (SARS-COV-2) (CORONAVIRUS DISEASE [COVID-19]), AMPLIFIED PROBE TECHNIQUE, MAKING USE OF HIGH THROUGHPUT TECHNOLOGIES AS DESCRIBED BY CMS-2020-01-R: HCPCS | Performed by: PHYSICIAN ASSISTANT

## 2021-09-22 ENCOUNTER — OFFICE VISIT (OUTPATIENT)
Dept: GYNECOLOGY | Facility: CLINIC | Age: 41
End: 2021-09-22
Payer: COMMERCIAL

## 2021-09-22 ENCOUNTER — ULTRASOUND (OUTPATIENT)
Dept: GYNECOLOGY | Facility: CLINIC | Age: 41
End: 2021-09-22
Payer: COMMERCIAL

## 2021-09-22 DIAGNOSIS — N93.9 ABNORMAL UTERINE BLEEDING (AUB): Primary | ICD-10-CM

## 2021-09-22 DIAGNOSIS — N92.0 MENORRHAGIA WITH REGULAR CYCLE: Primary | ICD-10-CM

## 2021-09-22 LAB — SL AMB POCT URINE HCG: NORMAL

## 2021-09-22 PROCEDURE — 58340 CATHETER FOR HYSTEROGRAPHY: CPT | Performed by: OBSTETRICS & GYNECOLOGY

## 2021-09-22 PROCEDURE — 88342 IMHCHEM/IMCYTCHM 1ST ANTB: CPT | Performed by: PATHOLOGY

## 2021-09-22 PROCEDURE — 99212 OFFICE O/P EST SF 10 MIN: CPT | Performed by: OBSTETRICS & GYNECOLOGY

## 2021-09-22 PROCEDURE — 76830 TRANSVAGINAL US NON-OB: CPT | Performed by: OBSTETRICS & GYNECOLOGY

## 2021-09-22 PROCEDURE — 58100 BIOPSY OF UTERUS LINING: CPT | Performed by: OBSTETRICS & GYNECOLOGY

## 2021-09-22 PROCEDURE — 88305 TISSUE EXAM BY PATHOLOGIST: CPT | Performed by: PATHOLOGY

## 2021-09-22 PROCEDURE — 76831 ECHO EXAM UTERUS: CPT | Performed by: OBSTETRICS & GYNECOLOGY

## 2021-09-22 RX ORDER — TRANEXAMIC ACID 650 1/1
650 TABLET ORAL 3 TIMES DAILY
Qty: 15 TABLET | Refills: 3 | Status: SHIPPED | OUTPATIENT
Start: 2021-09-22 | End: 2021-09-27

## 2021-09-22 NOTE — PROGRESS NOTES
AMB US Pelvic Non OB    Date/Time: 9/22/2021 8:28 AM  Performed by: Fox Bowen  Authorized by: Fortino Peterson DO   Universal Protocol:  Patient identity confirmed: verbally with patient      Procedure details:     Technique:  Transvaginal US, Non-OB    Position: lithotomy exam    Uterine findings:     Length (cm): 6 53    Height (cm):  3 04    Width (cm):  4 21    Endometrial stripe: identified      Endometrium thickness (mm):  5 8  Left ovary findings:     Left ovary:  Visualized    Length (cm): 2 6    Height (cm): 1 62    Width (cm): 1 67  Right ovary findings:     Right ovary:  Visualized    Length (cm): 3 72    Height (cm): 2 48    Width (cm): 2 75  Other findings:     Free pelvic fluid: not identified      Free peritoneal fluid: not identified    Post-Procedure Details:     Impression:  Anteverted uterus and bilateral ovaries appear within normal limits  The right ovary contains a follicle 9 0HU  No free fluid  Tolerance: Tolerated well, no immediate complications    Complications: no complications    Additional Procedure Comments:      BIScience P5 transvaginal transducer E8C with Serial Number [id number] was used during procedure and subsequently cleaned with high level disinfection utilizing the [employer]       Ultrasound performed at:     [facility] for [facility] [address]  [address]  [address], [address] [facility] [address]  Phone: [phone]  Fax:  [phone]    Sonohysterogram    Date/Time: 9/22/2021 8:29 AM  Performed by: Fortino Peterson DO  Authorized by: Fortino Peterson DO   Universal Protocol:  Patient identity confirmed: verbally with patient      Pre-procedure:     Prepped with: Betadine    Procedure:     Cervix cleaned and prepped: yes      Catheter inserted: yes      Uterine cavity distended with saline: yes    Post-procedure:     Patient observed: yes      Post procedure instructions given to patient: yes      Patient tolerated procedure well with no complications: yes    Comments:      Sonohysterogram demonstrates a smooth appearing endometrium  Endometrial biopsy    Date/Time: 9/22/2021 8:30 AM  Performed by: Yara Peters DO  Authorized by: Yara Peters DO   [place] Protocol:  Patient identity confirmed: verbally with patient      Indication:     Indications:  Other disorder of menstruation and other abnormal bleeding from female genital tract    Procedure:     Procedure: endometrial biopsy with Pipelle      A bivalve speculum was placed in the vagina: yes      Cervix cleaned and prepped: yes      Specimen collected: specimen collected and sent to pathology      Patient tolerated procedure well with no complications: yes

## 2021-09-22 NOTE — PROGRESS NOTES
Assessment/Plan:       Diagnoses and all orders for this visit:    Menorrhagia with regular cycle; reviewed T VS/SIS with biopsy results pending  Discussed with patient treatment options  Again she is planning on conceiving in the near future  Plan is TXA t i d  up to 5 days  If this does not control menorrhagia she will return to the office        Subjective:      Patient ID: Silvio Reyez is a 47 y o  female  HPI  patient was seen by Asad Castillo on August 30th for annual examination  At that time she is also complaining of abnormal uterine bleeding  She has been experiencing episodes of menorrhagia on and off over the past year  This associated with passage of large clots  She has planning on conceiving in the near future  Her  had a vasectomy reversal 2 years ago  He has fathered a daughter  He has had a semen analysis done since reversal and according to patient count was normal   She was advised to return to the office for transvaginal scan possible  saline infusion hysterosonography possible biopsy    AMB US Pelvic Non OB     Date/Time: 9/22/2021 8:28 AM  Performed by: Layne Solomon  Authorized by: Velma Wilhelm DO   Universal Protocol:  Patient identity confirmed: verbally with patient        Procedure details:     Technique:  Transvaginal US, Non-OB    Position: lithotomy exam    Uterine findings:     Length (cm): 6 53    Height (cm):  3 04    Width (cm):  4 21    Endometrial stripe: identified      Endometrium thickness (mm):  5 8  Left ovary findings:     Left ovary:  Visualized    Length (cm): 2 6    Height (cm): 1 62    Width (cm): 1 67  Right ovary findings:     Right ovary:  Visualized    Length (cm): 3 72    Height (cm): 2 48    Width (cm): 2 75  Other findings:     Free pelvic fluid: not identified      Free peritoneal fluid: not identified    Post-Procedure Details:     Impression:  Anteverted uterus and bilateral ovaries appear within normal limits   The right ovary contains a follicle 4 7LM  No free fluid  Tolerance: Tolerated well, no immediate complications    Complications: no complications    Additional Procedure Comments:      GE Logiq P5 transvaginal transducer E8C with Serial Number [id number] was used during procedure and subsequently cleaned with high level disinfection utilizing the [employer]       Ultrasound performed at:   [place] [address] for [facility] [address]  [address]  [address], [address]   Phone: [phone]  Fax:  [phone]     Sonohysterogram     Date/Time: 9/22/2021 8:29 AM  Performed by: Grady Grimes DO  Authorized by: Grady Grimes DO   Universal Protocol:  Patient identity confirmed: verbally with patient        Pre-procedure:     Prepped with: Betadine    Procedure:     Cervix cleaned and prepped: yes      Catheter inserted: yes      Uterine cavity distended with saline: yes    Post-procedure:     Patient observed: yes      Post procedure instructions given to patient: yes      Patient tolerated procedure well with no complications: yes    Comments:      Sonohysterogram demonstrates a smooth appearing endometrium  Endometrial biopsy     Date/Time: 9/22/2021 8:30 AM  Performed by: Grayd Grimes DO  Authorized by: Grady Grimes DO   Universal Protocol:  Patient identity confirmed: verbally with patient        Indication:     Indications:  Other disorder of menstruation and other abnormal bleeding from female genital tract    Procedure:     Procedure: endometrial biopsy with Pipelle      A bivalve speculum was placed in the vagina: yes      Cervix cleaned and prepped: yes      Specimen collected: specimen collected and sent to pathology      Patient tolerated procedure well with no complications: yes      The following portions of the patient's history were reviewed and updated as appropriate:   She  has a past medical history of Migraine, Ovarian cyst, Palpitations, Renal disorder, Tachycardia, Uterine fibroid, and Uterine leiomyoma  She   Patient Active Problem List    Diagnosis Date Noted    Lump in chest 08/26/2021    Oral thrush 08/26/2021    Elevated WBC count 08/26/2021    Irritable bowel syndrome with diarrhea 03/30/2021    Hyperlipidemia 03/23/2021    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    Excessive weight loss 03/23/2021    Recurrent Clostridium difficile diarrhea 01/13/2020    Increased stomach acid 02/01/2019    Malaise and fatigue 10/01/2018    History of migraine 09/27/2018    Cyst of right ovary 09/15/2017    Bronchospasm, exercise-induced 08/28/2017    Cervical dysplasia 08/28/2017    Chronic diarrhea 08/28/2017    Migraines 08/28/2017    Pain of plantar aspect of heel 06/08/2017     She  has a past surgical history that includes Tonsillectomy; Appendectomy; Cervical biopsy w/ loop electrode excision; Colonoscopy; Dental surgery; Esophagogastroduodenoscopy; Dilation and curettage of uterus; Ovarian cyst removal; and Colposcopy  Her family history includes Aortic aneurysm in her father; Arrhythmia in her mother; Cerebral aneurysm in her family; Clotting disorder in her father; Coronary artery disease in her mother; Heart attack in her mother; Heart failure in her paternal grandmother; Hyperlipidemia in her mother; Hypertension in her father; Other in her mother  She  reports that she has never smoked  She has never used smokeless tobacco  She reports that she does not drink alcohol and does not use drugs    Current Outpatient Medications   Medication Sig Dispense Refill    cholestyramine (QUESTRAN) 4 g packet Take 1 packet by mouth 3 (three) times a day with meals      dicyclomine (BENTYL) 20 mg tablet Take 1 tablet (20 mg total) by mouth 3 (three) times a day as needed (abdominal pain) 60 tablet 0    SUMAtriptan (Imitrex) 100 mg tablet Take 1 tablet (100 mg total) by mouth once as needed for migraine for up to 1 dose Up to 2 doses per migraine as needed 9 tablet 1     No current facility-administered medications for this visit  Current Outpatient Medications on File Prior to Visit   Medication Sig    cholestyramine (QUESTRAN) 4 g packet Take 1 packet by mouth 3 (three) times a day with meals    dicyclomine (BENTYL) 20 mg tablet Take 1 tablet (20 mg total) by mouth 3 (three) times a day as needed (abdominal pain)    SUMAtriptan (Imitrex) 100 mg tablet Take 1 tablet (100 mg total) by mouth once as needed for migraine for up to 1 dose Up to 2 doses per migraine as needed     No current facility-administered medications on file prior to visit  She is allergic to hydrocodone, morphine, morphine and related, oxycodone, penicillins, clomid [clomiphene], and sulfa antibiotics           Objective: There were no vitals taken for this visit

## 2021-09-29 ENCOUNTER — TELEPHONE (OUTPATIENT)
Dept: INTERNAL MEDICINE CLINIC | Facility: CLINIC | Age: 41
End: 2021-09-29

## 2021-10-04 PROCEDURE — 87205 SMEAR GRAM STAIN: CPT | Performed by: OTOLARYNGOLOGY

## 2021-10-04 PROCEDURE — 87070 CULTURE OTHR SPECIMN AEROBIC: CPT | Performed by: OTOLARYNGOLOGY

## 2021-10-28 ENCOUNTER — TELEPHONE (OUTPATIENT)
Dept: GYNECOLOGY | Facility: CLINIC | Age: 41
End: 2021-10-28

## 2021-10-29 ENCOUNTER — DOCUMENTATION (OUTPATIENT)
Dept: GYNECOLOGY | Facility: CLINIC | Age: 41
End: 2021-10-29

## 2021-10-29 DIAGNOSIS — N93.9 ABNORMAL UTERINE BLEEDING (AUB): Primary | ICD-10-CM

## 2021-10-29 RX ORDER — MEDROXYPROGESTERONE ACETATE 10 MG/1
10 TABLET ORAL DAILY
Qty: 10 TABLET | Refills: 0 | Status: SHIPPED | OUTPATIENT
Start: 2021-10-29 | End: 2022-06-16

## 2022-01-20 ENCOUNTER — APPOINTMENT (OUTPATIENT)
Dept: LAB | Facility: CLINIC | Age: 42
End: 2022-01-20
Payer: COMMERCIAL

## 2022-01-20 DIAGNOSIS — Z01.89 LABORATORY PROCEDURE: ICD-10-CM

## 2022-01-20 LAB
ALBUMIN SERPL BCP-MCNC: 4 G/DL (ref 3.5–5)
ALP SERPL-CCNC: 74 U/L (ref 46–116)
ALT SERPL W P-5'-P-CCNC: 16 U/L (ref 12–78)
ANION GAP SERPL CALCULATED.3IONS-SCNC: 3 MMOL/L (ref 4–13)
AST SERPL W P-5'-P-CCNC: 12 U/L (ref 5–45)
BILIRUB SERPL-MCNC: 0.73 MG/DL (ref 0.2–1)
BUN SERPL-MCNC: 8 MG/DL (ref 5–25)
CALCIUM SERPL-MCNC: 9.2 MG/DL (ref 8.3–10.1)
CHLORIDE SERPL-SCNC: 106 MMOL/L (ref 100–108)
CO2 SERPL-SCNC: 30 MMOL/L (ref 21–32)
CREAT SERPL-MCNC: 0.77 MG/DL (ref 0.6–1.3)
GFR SERPL CREATININE-BSD FRML MDRD: 97 ML/MIN/1.73SQ M
GLUCOSE SERPL-MCNC: 78 MG/DL (ref 65–140)
POTASSIUM SERPL-SCNC: 4.1 MMOL/L (ref 3.5–5.3)
PROT SERPL-MCNC: 7 G/DL (ref 6.4–8.2)
SODIUM SERPL-SCNC: 139 MMOL/L (ref 136–145)

## 2022-01-20 PROCEDURE — 36415 COLL VENOUS BLD VENIPUNCTURE: CPT

## 2022-01-20 PROCEDURE — 80053 COMPREHEN METABOLIC PANEL: CPT

## 2022-03-30 ENCOUNTER — APPOINTMENT (OUTPATIENT)
Dept: LAB | Facility: CLINIC | Age: 42
End: 2022-03-30

## 2022-03-30 DIAGNOSIS — Z00.8 ENCOUNTER FOR OTHER GENERAL EXAMINATION: ICD-10-CM

## 2022-03-30 DIAGNOSIS — Z00.8 HEALTH EXAMINATION IN POPULATION SURVEY: ICD-10-CM

## 2022-03-30 LAB
CHOLEST SERPL-MCNC: 189 MG/DL
EST. AVERAGE GLUCOSE BLD GHB EST-MCNC: 94 MG/DL
HBA1C MFR BLD: 4.9 %
HDLC SERPL-MCNC: 49 MG/DL
LDLC SERPL CALC-MCNC: 120 MG/DL (ref 0–100)
NONHDLC SERPL-MCNC: 140 MG/DL
TRIGL SERPL-MCNC: 102 MG/DL

## 2022-03-30 PROCEDURE — 36415 COLL VENOUS BLD VENIPUNCTURE: CPT

## 2022-03-30 PROCEDURE — 83036 HEMOGLOBIN GLYCOSYLATED A1C: CPT

## 2022-03-30 PROCEDURE — 80061 LIPID PANEL: CPT

## 2022-04-20 ENCOUNTER — OFFICE VISIT (OUTPATIENT)
Dept: GYNECOLOGY | Facility: CLINIC | Age: 42
End: 2022-04-20
Payer: COMMERCIAL

## 2022-04-20 VITALS
HEIGHT: 69 IN | HEART RATE: 65 BPM | DIASTOLIC BLOOD PRESSURE: 76 MMHG | BODY MASS INDEX: 18.84 KG/M2 | SYSTOLIC BLOOD PRESSURE: 120 MMHG | WEIGHT: 127.2 LBS

## 2022-04-20 DIAGNOSIS — N94.6 DYSMENORRHEA: ICD-10-CM

## 2022-04-20 DIAGNOSIS — N92.0 MENORRHAGIA WITH REGULAR CYCLE: Primary | ICD-10-CM

## 2022-04-20 PROCEDURE — 99212 OFFICE O/P EST SF 10 MIN: CPT | Performed by: OBSTETRICS & GYNECOLOGY

## 2022-04-20 RX ORDER — LEVONORGESTREL AND ETHINYL ESTRADIOL 0.15-0.03
1 KIT ORAL DAILY
Qty: 91 TABLET | Refills: 3 | Status: SHIPPED | OUTPATIENT
Start: 2022-04-20 | End: 2022-07-20

## 2022-04-20 NOTE — PROGRESS NOTES
Assessment/Plan:  Will start on low-dose oral contraceptives  If she has any issues with persistence of dysmenorrhea or menorrhagia she will return to the office  Diagnoses and all orders for this visit:    Menorrhagia with regular cycle  -     levonorgestrel-ethinyl estradiol (Quasense) 0 15-0 03 MG per tablet; Take 1 tablet by mouth daily    Dysmenorrhea  -     levonorgestrel-ethinyl estradiol (Quasense) 0 15-0 03 MG per tablet; Take 1 tablet by mouth daily        Subjective:      Patient ID: Anuel Zamorano is a 46 y o  female  HPI  patient had been seen by Vicki Winters in August of 2021 for annual examination  At that time she was also complaining of abnormal uterine bleeding  She has been experiencing episodes of menorrhagia on and off over the year prior to that  She was planning on conceiving in the near future  Her  had a vasectomy reversal 2 years ago  He has had a semen analysis done since reversal and according to the patient this was normal   Because abnormal uterine bleeding she return to the office for transvaginal scan with saline infusion hysterosonography and endometrial biopsy  All which was negative  She underwent  1 unsuccessful cycle of IVF  Over the past several months her periods have been regular but extremely heavy with passage of large clots and severe dysmenorrhea  Patient would like to resume oral contraceptives  She has decided to forego fertility treatments    The following portions of the patient's history were reviewed and updated as appropriate:   She  has a past medical history of Migraine, Ovarian cyst, Palpitations, Renal disorder, Tachycardia, Uterine fibroid, and Uterine leiomyoma    She   Patient Active Problem List    Diagnosis Date Noted    Lump in chest 08/26/2021    Oral thrush 08/26/2021    Elevated WBC count 08/26/2021    Irritable bowel syndrome with diarrhea 03/30/2021    Hyperlipidemia 03/23/2021    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    Excessive weight loss 03/23/2021    Recurrent Clostridium difficile diarrhea 01/13/2020    Increased stomach acid 02/01/2019    Malaise and fatigue 10/01/2018    History of migraine 09/27/2018    Cyst of right ovary 09/15/2017    Bronchospasm, exercise-induced 08/28/2017    Cervical dysplasia 08/28/2017    Chronic diarrhea 08/28/2017    Migraines 08/28/2017    Pain of plantar aspect of heel 06/08/2017     She  has a past surgical history that includes Tonsillectomy; Appendectomy; Cervical biopsy w/ loop electrode excision; Colonoscopy; Dental surgery; Esophagogastroduodenoscopy; Dilation and curettage of uterus; Ovarian cyst removal; Colposcopy; and Colonoscopy with Fecal Transplant  Her family history includes Aortic aneurysm in her father; Arrhythmia in her mother; Cerebral aneurysm in her family; Clotting disorder in her father; Coronary artery disease in her mother; Heart attack in her mother; Heart failure in her paternal grandmother; Hyperlipidemia in her mother; Hypertension in her father; Other in her mother  She  reports that she has never smoked  She has never used smokeless tobacco  She reports that she does not drink alcohol and does not use drugs    Current Outpatient Medications   Medication Sig Dispense Refill    cholestyramine (QUESTRAN) 4 g packet Take 1 packet by mouth 3 (three) times a day with meals (Patient not taking: Reported on 10/4/2021)      dicyclomine (BENTYL) 20 mg tablet Take 1 tablet (20 mg total) by mouth 3 (three) times a day as needed (abdominal pain) (Patient not taking: Reported on 10/4/2021) 60 tablet 0    levonorgestrel-ethinyl estradiol (Quasense) 0 15-0 03 MG per tablet Take 1 tablet by mouth daily 91 tablet 3    medroxyPROGESTERone (PROVERA) 10 mg tablet Take 1 tablet (10 mg total) by mouth daily 10 tablet 0    SUMAtriptan (Imitrex) 100 mg tablet Take 1 tablet (100 mg total) by mouth once as needed for migraine for up to 1 dose Up to 2 doses per migraine as needed (Patient not taking: Reported on 10/4/2021) 9 tablet 1     No current facility-administered medications for this visit  Current Outpatient Medications on File Prior to Visit   Medication Sig    cholestyramine (QUESTRAN) 4 g packet Take 1 packet by mouth 3 (three) times a day with meals (Patient not taking: Reported on 10/4/2021)    dicyclomine (BENTYL) 20 mg tablet Take 1 tablet (20 mg total) by mouth 3 (three) times a day as needed (abdominal pain) (Patient not taking: Reported on 10/4/2021)    medroxyPROGESTERone (PROVERA) 10 mg tablet Take 1 tablet (10 mg total) by mouth daily    SUMAtriptan (Imitrex) 100 mg tablet Take 1 tablet (100 mg total) by mouth once as needed for migraine for up to 1 dose Up to 2 doses per migraine as needed (Patient not taking: Reported on 10/4/2021)     No current facility-administered medications on file prior to visit  She is allergic to hydrocodone, morphine, morphine and related, oxycodone, penicillins, clomid [clomiphene], and sulfa antibiotics       Review of Systems      Objective:      /76   Pulse 65   Ht 5' 9 2" (1 758 m)   Wt 57 7 kg (127 lb 3 2 oz)   LMP 03/29/2022   BMI 18 68 kg/m²

## 2022-05-02 ENCOUNTER — DOCUMENTATION (OUTPATIENT)
Dept: GYNECOLOGY | Facility: CLINIC | Age: 42
End: 2022-05-02

## 2022-05-02 NOTE — PROGRESS NOTES
Pt started her OCP as soon as she got her period,  soince that day she is c/o a pain 2 inc below her navel  A quick sharp pain then it goes away  But is happening daily  Was told to call if any concerns    Is this a concern or part of the adjustment phase? No nausea no change in BM or vomiting just odd twinge     Please advise

## 2022-05-10 ENCOUNTER — TELEPHONE (OUTPATIENT)
Dept: OBGYN CLINIC | Facility: CLINIC | Age: 42
End: 2022-05-10

## 2022-05-10 DIAGNOSIS — R10.2 PELVIC PAIN: Primary | ICD-10-CM

## 2022-05-10 NOTE — TELEPHONE ENCOUNTER
PT called regarding ongoing pain since beginning birth control in the end of April  After consulting with Dr Ramon Page, ordered placed for ultrasound  Pt made aware and will be calling to schedule it

## 2022-05-11 ENCOUNTER — HOSPITAL ENCOUNTER (OUTPATIENT)
Dept: ULTRASOUND IMAGING | Facility: HOSPITAL | Age: 42
Discharge: HOME | End: 2022-05-11
Payer: COMMERCIAL

## 2022-05-11 DIAGNOSIS — R10.2 PELVIC PAIN: ICD-10-CM

## 2022-05-11 PROCEDURE — 76856 US EXAM PELVIC COMPLETE: CPT

## 2022-05-11 PROCEDURE — 76830 TRANSVAGINAL US NON-OB: CPT

## 2022-05-16 ENCOUNTER — DOCUMENTATION (OUTPATIENT)
Dept: GYNECOLOGY | Facility: CLINIC | Age: 42
End: 2022-05-16

## 2022-05-21 DIAGNOSIS — U07.1 COVID-19: ICD-10-CM

## 2022-05-21 PROCEDURE — U0003 INFECTIOUS AGENT DETECTION BY NUCLEIC ACID (DNA OR RNA); SEVERE ACUTE RESPIRATORY SYNDROME CORONAVIRUS 2 (SARS-COV-2) (CORONAVIRUS DISEASE [COVID-19]), AMPLIFIED PROBE TECHNIQUE, MAKING USE OF HIGH THROUGHPUT TECHNOLOGIES AS DESCRIBED BY CMS-2020-01-R: HCPCS | Performed by: PHYSICIAN ASSISTANT

## 2022-05-21 PROCEDURE — U0005 INFEC AGEN DETEC AMPLI PROBE: HCPCS | Performed by: PHYSICIAN ASSISTANT

## 2022-05-22 ENCOUNTER — TELEPHONE (OUTPATIENT)
Dept: URGENT CARE | Facility: CLINIC | Age: 42
End: 2022-05-22

## 2022-05-22 LAB — SARS-COV-2 RNA RESP QL NAA+PROBE: POSITIVE

## 2022-05-23 DIAGNOSIS — U07.1 COVID: Primary | ICD-10-CM

## 2022-05-23 DIAGNOSIS — R06.02 SHORTNESS OF BREATH: ICD-10-CM

## 2022-05-23 RX ORDER — ALBUTEROL SULFATE 90 UG/1
2 AEROSOL, METERED RESPIRATORY (INHALATION) EVERY 4 HOURS PRN
Qty: 18 G | Refills: 1 | Status: SHIPPED | OUTPATIENT
Start: 2022-05-23 | End: 2022-06-16

## 2022-05-23 RX ORDER — BUDESONIDE 90 UG/1
2 AEROSOL, POWDER RESPIRATORY (INHALATION) 2 TIMES DAILY
Qty: 1 EACH | Refills: 1 | Status: SHIPPED | OUTPATIENT
Start: 2022-05-23 | End: 2022-05-23 | Stop reason: CLARIF

## 2022-05-23 RX ORDER — DEXAMETHASONE 4 MG/1
TABLET ORAL
Qty: 12 G | Refills: 1 | Status: SHIPPED | OUTPATIENT
Start: 2022-05-23 | End: 2022-06-16

## 2022-05-23 NOTE — PROGRESS NOTES
Pulmicort not covered by insurance; will send flovent instead  Messaged patient privately to let her know

## 2022-05-23 NOTE — PROGRESS NOTES
Spoke to [facility] via phone  Sore throat and ear pain have improved  Having chest symptoms--tightness, shortness of breath, wheezing  Nasal congestion also developing  Unrelieved by otc medications  Took prednisone 30 Friday and Sat without relief (had available in house); did not help  Prefers inhaler to additional oral steroid  Discussed using medication that contains guaifenesin to keep the mucous thin/moving  A medication containing dextromethorphan for some cough suppression is fine as well as a decongestant (sudafed or phenylephrine) if desired  Patient works here in urgent care; she is aware of when to come in and be seen if respiratory symptoms worsen

## 2022-06-16 ENCOUNTER — OFFICE VISIT (OUTPATIENT)
Dept: INTERNAL MEDICINE CLINIC | Facility: CLINIC | Age: 42
End: 2022-06-16
Payer: COMMERCIAL

## 2022-06-16 VITALS
HEIGHT: 69 IN | DIASTOLIC BLOOD PRESSURE: 74 MMHG | WEIGHT: 129.9 LBS | HEART RATE: 82 BPM | SYSTOLIC BLOOD PRESSURE: 120 MMHG | BODY MASS INDEX: 19.24 KG/M2 | OXYGEN SATURATION: 100 % | TEMPERATURE: 97.3 F

## 2022-06-16 DIAGNOSIS — T61.91XA ALLERGIC REACTION TO FISH: ICD-10-CM

## 2022-06-16 DIAGNOSIS — R10.31 RIGHT LOWER QUADRANT ABDOMINAL PAIN: Primary | ICD-10-CM

## 2022-06-16 DIAGNOSIS — E78.5 HYPERLIPIDEMIA, UNSPECIFIED HYPERLIPIDEMIA TYPE: ICD-10-CM

## 2022-06-16 PROCEDURE — 99214 OFFICE O/P EST MOD 30 MIN: CPT | Performed by: FAMILY MEDICINE

## 2022-06-16 RX ORDER — EPINEPHRINE 0.3 MG/.3ML
0.3 INJECTION SUBCUTANEOUS ONCE
Qty: 0.6 ML | Refills: 0 | Status: SHIPPED | OUTPATIENT
Start: 2022-06-16 | End: 2022-06-16

## 2022-06-17 NOTE — PROGRESS NOTES
Assessment/Plan:    No problem-specific Assessment & Plan notes found for this encounter  Diagnoses and all orders for this visit:    Right lower quadrant abdominal pain  -     CT abdomen pelvis wo contrast; Future    Allergic reaction to fish  -     EPINEPHrine (EPIPEN) 0 3 mg/0 3 mL SOAJ; Inject 0 3 mL (0 3 mg total) into a muscle once for 1 dose    Hyperlipidemia, unspecified hyperlipidemia type    orders recommendations as noted above  Has undergone evaluation through gyn with no definite pelvic abnormalities found to explain her symptoms  Discussed with her the possibility of adhesions versus hernia  Discussed potential fish allergy with her  Discussed evaluation with an allergist and she will consider this  EpiPen provided  Has completed her wellness laboratory testing  Cholesterol was elevated  Watch diet more closely  This is difficult since she has been trying to gain weight  Recommended rechecking laboratory testing in the fall  Will have her follow up in the fall or sooner if needed  This will depend on the results of her testing  Subjective:      Patient ID: Lisy Dunaway is a 46 y o  female  She presents for problem visit  Has been having right lower abdominal pain intermittently  Pain will come with activity  Can occur with standing for prolonged periods or with certain movements  Worse with lifting  Did undergo ache retrieval for fertility treatments  Symptoms started thereafter  Did follow-up with gyn with no pelvic abnormalities found  She is concerned about a potential hernia  No recurrent symptoms of the C diff  She also has been having some issues with eating fish over the last few years  Initially was just with fresh fish  She could previously eat frozen fish without difficulty  Now is having mild symptoms with certain types of frozen fish as well as sums shellfish  Describes a tightness and itchiness into her throat  Feels it into her ears as well  No shortness of breath with this  Does resolve with Benadryl  Has not undergone any allergy testing  The following portions of the patient's history were reviewed and updated as appropriate:   She  has a past medical history of Migraine, Ovarian cyst, Palpitations, Renal disorder, Tachycardia, Uterine fibroid, and Uterine leiomyoma  She   Patient Active Problem List    Diagnosis Date Noted    Right lower quadrant abdominal pain 06/16/2022    Allergic reaction to fish 06/16/2022    Lump in chest 08/26/2021    Oral thrush 08/26/2021    Elevated WBC count 08/26/2021    Irritable bowel syndrome with diarrhea 03/30/2021    Hyperlipidemia 03/23/2021    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    Excessive weight loss 03/23/2021    Recurrent Clostridium difficile diarrhea 01/13/2020    Increased stomach acid 02/01/2019    Malaise and fatigue 10/01/2018    History of migraine 09/27/2018    Cyst of right ovary 09/15/2017    Bronchospasm, exercise-induced 08/28/2017    Cervical dysplasia 08/28/2017    Chronic diarrhea 08/28/2017    Migraines 08/28/2017    Pain of plantar aspect of heel 06/08/2017     She  has a past surgical history that includes Tonsillectomy; Appendectomy; Cervical biopsy w/ loop electrode excision; Colonoscopy; Dental surgery; Esophagogastroduodenoscopy; Dilation and curettage of uterus; Ovarian cyst removal; Colposcopy; and Colonoscopy with Fecal Transplant  Her family history includes Aortic aneurysm in her father; Arrhythmia in her mother; Cerebral aneurysm in her family; Clotting disorder in her father; Coronary artery disease in her mother; Heart attack in her mother; Heart failure in her paternal grandmother; Hyperlipidemia in her mother; Hypertension in her father; Other in her mother  She  reports that she has never smoked  She has never used smokeless tobacco  She reports current alcohol use of about 1 0 standard drink of alcohol per week   She reports that she does not use drugs  Current Outpatient Medications   Medication Sig Dispense Refill    EPINEPHrine (EPIPEN) 0 3 mg/0 3 mL SOAJ Inject 0 3 mL (0 3 mg total) into a muscle once for 1 dose 0 6 mL 0    levonorgestrel-ethinyl estradiol (Quasense) 0 15-0 03 MG per tablet Take 1 tablet by mouth daily 91 tablet 3    SUMAtriptan (Imitrex) 100 mg tablet Take 1 tablet (100 mg total) by mouth once as needed for migraine for up to 1 dose Up to 2 doses per migraine as needed (Patient taking differently: Take 100 mg by mouth if needed for migraine Up to 2 doses per migraine as needed) 9 tablet 1     No current facility-administered medications for this visit  Current Outpatient Medications on File Prior to Visit   Medication Sig    levonorgestrel-ethinyl estradiol (Quasense) 0 15-0 03 MG per tablet Take 1 tablet by mouth daily    SUMAtriptan (Imitrex) 100 mg tablet Take 1 tablet (100 mg total) by mouth once as needed for migraine for up to 1 dose Up to 2 doses per migraine as needed (Patient taking differently: Take 100 mg by mouth if needed for migraine Up to 2 doses per migraine as needed)     No current facility-administered medications on file prior to visit  She is allergic to hydrocodone, morphine, morphine and related, oxycodone, penicillins, clomid [clomiphene], and sulfa antibiotics       Review of Systems   Constitutional: Positive for activity change  Negative for chills and fever  Gastrointestinal:        As per HPI   Genitourinary: Negative for urgency, vaginal discharge and vaginal pain  Allergic/Immunologic:        As per HPI         Objective:      /74 (BP Location: Right arm, Patient Position: Sitting, Cuff Size: Standard)   Pulse 82   Temp (!) 97 3 °F (36 3 °C)   Ht 5' 9 25" (1 759 m)   Wt 58 9 kg (129 lb 14 4 oz)   SpO2 100%   BMI 19 04 kg/m²          Physical Exam  Vitals and nursing note reviewed     Constitutional:       Appearance: She is well-developed, well-groomed and normal weight  HENT:      Head:      Comments: Moist mucous membranes; no pharyngeal erythema; tympanic membranes normal bilaterally  Cardiovascular:      Rate and Rhythm: Normal rate and regular rhythm  Heart sounds: No murmur heard  Pulmonary:      Breath sounds: No decreased breath sounds, wheezing or rhonchi  Abdominal:      Comments: Slight tenderness along right lower abdominal area; slight fullness noted at the proximal inguinal area; no definite hernia palpable; normal bowel sounds   Musculoskeletal:      Cervical back: Neck supple  Lymphadenopathy:      Cervical: No cervical adenopathy  Neurological:      Mental Status: She is alert  Psychiatric:         Mood and Affect: Mood and affect normal          Speech: Speech normal          Behavior: Behavior is cooperative

## 2022-06-21 ENCOUNTER — APPOINTMENT (EMERGENCY)
Dept: CT IMAGING | Facility: HOSPITAL | Age: 42
End: 2022-06-21
Payer: COMMERCIAL

## 2022-06-21 ENCOUNTER — HOSPITAL ENCOUNTER (EMERGENCY)
Facility: HOSPITAL | Age: 42
Discharge: HOME | End: 2022-06-21
Attending: EMERGENCY MEDICINE | Admitting: EMERGENCY MEDICINE
Payer: COMMERCIAL

## 2022-06-21 VITALS
DIASTOLIC BLOOD PRESSURE: 85 MMHG | RESPIRATION RATE: 20 BRPM | SYSTOLIC BLOOD PRESSURE: 150 MMHG | OXYGEN SATURATION: 100 % | TEMPERATURE: 97.5 F | HEART RATE: 84 BPM

## 2022-06-21 DIAGNOSIS — R10.2 PELVIC PAIN IN FEMALE: Primary | ICD-10-CM

## 2022-06-21 LAB
BACTERIA UR QL AUTO: ABNORMAL /HPF
BILIRUB UR QL STRIP: NEGATIVE
CLARITY UR: CLEAR
COLOR UR: YELLOW
EXT PREG TEST URINE: NEGATIVE
EXT. CONTROL ED NAV: NORMAL
GLUCOSE UR STRIP-MCNC: NEGATIVE MG/DL
HGB UR QL STRIP.AUTO: ABNORMAL
KETONES UR STRIP-MCNC: NEGATIVE MG/DL
LEUKOCYTE ESTERASE UR QL STRIP: NEGATIVE
NITRITE UR QL STRIP: NEGATIVE
NON-SQ EPI CELLS URNS QL MICRO: ABNORMAL /HPF
PH UR STRIP.AUTO: 6 [PH]
PROT UR STRIP-MCNC: NEGATIVE MG/DL
RBC #/AREA URNS AUTO: ABNORMAL /HPF
SP GR UR STRIP.AUTO: 1.01 (ref 1–1.03)
UROBILINOGEN UR QL STRIP.AUTO: 0.2 E.U./DL
WBC #/AREA URNS AUTO: ABNORMAL /HPF

## 2022-06-21 PROCEDURE — G1004 CDSM NDSC: HCPCS

## 2022-06-21 PROCEDURE — 99284 EMERGENCY DEPT VISIT MOD MDM: CPT

## 2022-06-21 PROCEDURE — 74176 CT ABD & PELVIS W/O CONTRAST: CPT

## 2022-06-21 PROCEDURE — 81025 URINE PREGNANCY TEST: CPT | Performed by: EMERGENCY MEDICINE

## 2022-06-21 PROCEDURE — 99282 EMERGENCY DEPT VISIT SF MDM: CPT | Performed by: EMERGENCY MEDICINE

## 2022-06-21 PROCEDURE — 81001 URINALYSIS AUTO W/SCOPE: CPT | Performed by: EMERGENCY MEDICINE

## 2022-06-21 NOTE — ED PROVIDER NOTES
History  Chief Complaint   Patient presents with    Pelvic Pain     Pelvic pain for some time, pinching in nature  Being treated by OBGYN w/ no resolve  79-year-old female presents for evaluation of pelvic pain  Patient has had the pain for about 2 months, is intermittent in nature although possibly increasing in frequency; she believes it started around the time of starting her oral contraceptive  She points to her lower pelvic region as to where the pain is, it is exacerbated with twisting motions  She reports taking a deep breath will help resolve the pain, she has been taking ibuprofen at night in anticipation of twisting in bed  Patient saw her OBGYN who ordered a pelvic ultrasound which was reported to be unremarkable, she saw her primary care provider on 06/16/2022 who ordered an outpatient CT scan  She denies other exacerbating or remitting factors  She denies dysuria, concern for STD  She was started on birth control few months ago and has had spotting since  Denies constipation, had dairy products today with associated loose stools  She denies fevers or chills, previous abdominal surgeries  She does report going through IVF earlier in the year a few months before her pelvic pain started with a retrieval           Prior to Admission Medications   Prescriptions Last Dose Informant Patient Reported? Taking?    EPINEPHrine (EPIPEN) 0 3 mg/0 3 mL SOAJ   No No   Sig: Inject 0 3 mL (0 3 mg total) into a muscle once for 1 dose   SUMAtriptan (Imitrex) 100 mg tablet  Self No No   Sig: Take 1 tablet (100 mg total) by mouth once as needed for migraine for up to 1 dose Up to 2 doses per migraine as needed   Patient taking differently: Take 100 mg by mouth if needed for migraine Up to 2 doses per migraine as needed   levonorgestrel-ethinyl estradiol (Quasense) 0 15-0 03 MG per tablet 6/20/2022 at Unknown time  No Yes   Sig: Take 1 tablet by mouth daily      Facility-Administered Medications: None Past Medical History:   Diagnosis Date    Migraine     Ovarian cyst     Palpitations     Renal disorder     acute kidney injury    Tachycardia     Uterine fibroid     Uterine leiomyoma     Last assessed 8/28/2017       Past Surgical History:   Procedure Laterality Date    APPENDECTOMY      CERVICAL BIOPSY  W/ LOOP ELECTRODE EXCISION      Last assessed 8/28/2017    COLONOSCOPY      COLONOSCOPY WITH FECAL TRANSPLANT      COLPOSCOPY      DENTAL SURGERY      DILATION AND CURETTAGE OF UTERUS      Last assessed 8/28/2017    ESOPHAGOGASTRODUODENOSCOPY      OVARIAN CYST REMOVAL      Last assessed 8/28/2017    TONSILLECTOMY         Family History   Problem Relation Age of Onset    Other Mother         Cardiac Disorder    Heart attack Mother     Arrhythmia Mother     Coronary artery disease Mother     Hyperlipidemia Mother     Aortic aneurysm Father         Abdomincal aortic aneurysm    Hypertension Father     Clotting disorder Father     Cerebral aneurysm Family     Heart failure Paternal Grandmother     Stroke Neg Hx     Cancer Neg Hx     Colon cancer Neg Hx     Breast cancer Neg Hx     Ovarian cancer Neg Hx      I have reviewed and agree with the history as documented  E-Cigarette/Vaping    E-Cigarette Use Never User      E-Cigarette/Vaping Substances    Nicotine No     THC No     CBD No     Flavoring No     Other No     Unknown No      Social History     Tobacco Use    Smoking status: Never Smoker    Smokeless tobacco: Never Used   Vaping Use    Vaping Use: Never used   Substance Use Topics    Alcohol use: Yes     Alcohol/week: 1 0 standard drink     Types: 1 Glasses of wine per week     Comment: socially    Drug use: No       Review of Systems   Constitutional: Negative for appetite change, chills and fever  Respiratory: Negative for shortness of breath  Cardiovascular: Negative for chest pain     Gastrointestinal: Negative for blood in stool, constipation, diarrhea, nausea and vomiting  Genitourinary: Positive for menstrual problem (spotting with new OCP) and pelvic pain  Negative for flank pain and frequency  All other systems reviewed and are negative  Physical Exam  Physical Exam  Vitals reviewed  Constitutional:       General: She is not in acute distress  Appearance: Normal appearance  She is not ill-appearing, toxic-appearing or diaphoretic  HENT:      Head: Normocephalic and atraumatic  Right Ear: External ear normal       Left Ear: External ear normal    Eyes:      General: No scleral icterus  Right eye: No discharge  Left eye: No discharge  Extraocular Movements: Extraocular movements intact  Cardiovascular:      Rate and Rhythm: Normal rate  Pulmonary:      Effort: Pulmonary effort is normal  No respiratory distress  Abdominal:      General: There is no distension  Palpations: Abdomen is soft  Tenderness: There is no abdominal tenderness  There is no guarding or rebound  Musculoskeletal:         General: No deformity or signs of injury  Right lower leg: No edema  Left lower leg: No edema  Skin:     General: Skin is warm  Coloration: Skin is not jaundiced or pale  Neurological:      General: No focal deficit present  Mental Status: She is alert  Mental status is at baseline        Gait: Gait normal          Vital Signs  ED Triage Vitals   Temperature Pulse Respirations Blood Pressure SpO2   06/21/22 1709 06/21/22 1709 06/21/22 1709 06/21/22 1709 06/21/22 1709   97 5 °F (36 4 °C) 84 16 137/86 100 %      Temp Source Heart Rate Source Patient Position - Orthostatic VS BP Location FiO2 (%)   06/21/22 1709 06/21/22 1815 06/21/22 1709 06/21/22 1709 --   Temporal Monitor Lying Right arm       Pain Score       06/21/22 1709       10 - Worst Possible Pain           Vitals:    06/21/22 1709 06/21/22 1815   BP: 137/86 150/85   Pulse: 84 84   Patient Position - Orthostatic VS: Lying Lying Visual Acuity      ED Medications  Medications - No data to display    Diagnostic Studies  Results Reviewed     Procedure Component Value Units Date/Time    Urine Microscopic [[id number]]  (Abnormal) Collected: 06/21/22 1828    Lab Status: Final result Specimen: Urine, Clean Catch Updated: 06/21/22 1855     RBC, UA 4-10 /hpf      WBC, UA None Seen /hpf      Epithelial Cells Occasional /hpf      Bacteria, UA Occasional /hpf     UA w Reflex to Microscopic w Reflex to Culture [[id number]]  (Abnormal) Collected: 06/21/22 1828    Lab Status: Final result Specimen: Urine, Clean Catch Updated: 06/21/22 1846     Color, UA Yellow     Clarity, UA Clear     Specific Gravity, UA 1 010     pH, UA 6 0     Leukocytes, UA Negative     Nitrite, UA Negative     Protein, UA Negative mg/dl      Glucose, UA Negative mg/dl      Ketones, UA Negative mg/dl      Urobilinogen, UA 0 2 E U /dl      Bilirubin, UA Negative     Blood, UA Moderate    POCT pregnancy, urine [[id number]]  (Normal) Resulted: 06/21/22 1830    Lab Status: Final result Updated: 06/21/22 1830     EXT PREG TEST UR (Ref: Negative) negative     Control valid                 CT abdomen pelvis wo contrast   Final Result by Shirin Myers MD (06/21 2037)      No evidence of acute intra-abdominal or pelvic process  If there is persistent concern for acute pelvic process, recommend ultrasound  Workstation performed: [id number]                    Procedures  Procedures         ED Course  ED Course as of 06/22/22 0036   Tue Jun 21, 202221, 2022 2039 CT abdomen pelvis wo contrast  IMPRESSION:     No evidence of acute intra-abdominal or pelvic process  If there is persistent concern for acute pelvic process, recommend ultrasound                                   SBIRT 22yo+    Flowsheet Row Most Recent Value   SBIRT (25 yo +)    In order to provide better care to our patients, we are screening all of our patients for alcohol and drug use   Would it be okay to ask you these screening questions? Unable to answer at this time Filed at: 06/21/2022 1841                    [facility]  Number of Diagnoses or Management Options  Pelvic pain in female  Diagnosis management comments: 42-year-old female presents for evaluation of pelvic pain  She has been having this pain for about 2 months and has previously had a pelvic ultrasound  She saw her primary care provider who ordered her an outpatient CT scan, abdominal exam is benign and vitals are within normal       Disposition  Final diagnoses:   Pelvic pain in female     Time reflects when diagnosis was documented in both MDM as applicable and the Disposition within this note     Time User Action Codes Description Comment    6/21/2022  8:40 PM Katarina Cruz Add [R10 2] Pelvic pain in female       ED Disposition     ED Disposition   Discharge    Condition   Stable    Date/Time   Tue Jun 21, 2022  8:40 PM    [address] [facility] discharge to home/self care  Follow-up Information     Follow up With Specialties Details Why [address], MD Family Medicine   [address] [place] [address]  [phone]            Discharge Medication List as of 6/21/2022  8:40 PM      CONTINUE these medications which have NOT CHANGED    Details   levonorgestrel-ethinyl estradiol (Quasense) 0 15-0 03 MG per tablet Take 1 tablet by mouth daily, Starting Wed 4/20/2022, Until Wed 7/20/2022, Normal      EPINEPHrine (EPIPEN) 0 3 mg/0 3 mL SOAJ Inject 0 3 mL (0 3 mg total) into a muscle once for 1 dose, Starting Thu 6/16/2022, Normal      SUMAtriptan (Imitrex) 100 mg tablet Take 1 tablet (100 mg total) by mouth once as needed for migraine for up to 1 dose Up to 2 doses per migraine as needed, Starting Fri 8/27/2021, Normal             No discharge procedures on file      PDMP Review     None          ED Provider  Electronically Signed by           Codey Arauz DO  06/22/22 0036

## 2022-08-19 DIAGNOSIS — G43.909 MIGRAINE: ICD-10-CM

## 2022-08-21 RX ORDER — SUMATRIPTAN 100 MG/1
100 TABLET, FILM COATED ORAL ONCE AS NEEDED
Qty: 9 TABLET | Refills: 0 | Status: SHIPPED | OUTPATIENT
Start: 2022-08-21

## 2022-10-12 PROBLEM — A04.71 RECURRENT CLOSTRIDIUM DIFFICILE DIARRHEA: Status: RESOLVED | Noted: 2020-01-13 | Resolved: 2022-10-12

## 2022-12-13 ENCOUNTER — TELEPHONE (OUTPATIENT)
Dept: INTERNAL MEDICINE CLINIC | Facility: CLINIC | Age: 42
End: 2022-12-13

## 2023-01-12 ENCOUNTER — TELEPHONE (OUTPATIENT)
Dept: OTHER | Facility: OTHER | Age: 43
End: 2023-01-12

## 2023-01-12 DIAGNOSIS — K52.9 CHRONIC DIARRHEA: Primary | ICD-10-CM

## 2023-01-12 NOTE — TELEPHONE ENCOUNTER
Call from patient advising she had a fecal transplant 2 years ago and she is concerned that the c diff is back due to diarrhea and smell coming from her bowel movements since early December  Patient is asking to go for a C Diff test so she can have that reviewed at her appointment next week  Please call patient to discuss

## 2023-01-17 ENCOUNTER — APPOINTMENT (OUTPATIENT)
Dept: LAB | Facility: CLINIC | Age: 43
End: 2023-01-17

## 2023-01-17 DIAGNOSIS — K52.9 CHRONIC DIARRHEA: ICD-10-CM

## 2023-01-18 LAB
C DIFF TOX GENS STL QL NAA+PROBE: NEGATIVE
CAMPYLOBACTER DNA SPEC NAA+PROBE: NORMAL
G LAMBLIA AG STL QL IA: NEGATIVE
SALMONELLA DNA SPEC QL NAA+PROBE: NORMAL
SHIGA TOXIN STX GENE SPEC NAA+PROBE: NORMAL
SHIGELLA DNA SPEC QL NAA+PROBE: NORMAL

## 2023-01-19 ENCOUNTER — OFFICE VISIT (OUTPATIENT)
Dept: GASTROENTEROLOGY | Facility: CLINIC | Age: 43
End: 2023-01-19

## 2023-01-19 VITALS
SYSTOLIC BLOOD PRESSURE: 138 MMHG | WEIGHT: 132 LBS | HEART RATE: 85 BPM | HEIGHT: 68 IN | BODY MASS INDEX: 20 KG/M2 | DIASTOLIC BLOOD PRESSURE: 84 MMHG

## 2023-01-19 DIAGNOSIS — K58.0 IRRITABLE BOWEL SYNDROME WITH DIARRHEA: Primary | ICD-10-CM

## 2023-01-19 NOTE — PROGRESS NOTES
[facility]  Devika Chin 38 y o  female MRN: [id number]       CC: Diarrhea    HPI: Andreia Mckeon is a pleasant 38year old female with history of recurrent C diff status post FMT in January 2021  Patient presents today for abnormal bowel habits that began in the beginning of December  She reports having an episode of sweats followed by urgency and diarrhea  This was followed by constipation for several days  Near Korin time, she was ill with nausea, vomiting and diarrhea which she attributes to a viral gastroenteritis  Since then, she has had soft incomplete bowel movements  Upon review of prior CTs, she does have history of diverticulosis and increased stool burden  Patient reports anxiety over the fact that she had recurrent diarrhea given her history  However, feels relief that her C diff testing was negative  She reports increased stress as her mother is recovering from complicated diverticulitis requiring an ostomy, starting a new business, etc  She reports prior to having C diff, she had IBS symptoms  Otherwise, her weight is stable  She has no associated bloating or signs of overt GI bleeding  Again, her last colonoscopy was in January 2021 for FMT  At the time, she was noted to have sigmoid diverticulosis  CT imaging in September 2022 showed pan colonic diverticulosis and increased stool burden during review with the patient  Review of Systems:    CONSTITUTIONAL: Denies any fever, chills, or rigors  Good appetite, and no recent weight loss  HEENT: No earache or tinnitus  Denies hearing loss or visual disturbances  CARDIOVASCULAR: No chest pain or palpitations  RESPIRATORY: Denies any cough, hemoptysis, shortness of breath or dyspnea on exertion  GASTROINTESTINAL: As noted in the History of Present Illness  GENITOURINARY: No problems with urination  Denies any hematuria or dysuria  NEUROLOGIC: No dizziness or vertigo, denies headaches     MUSCULOSKELETAL: Denies any muscle or joint pain  SKIN: Denies skin rashes or itching  ENDOCRINE: Denies excessive thirst  Denies intolerance to heat or cold  PSYCHOSOCIAL: Denies depression or anxiety  Denies any recent memory loss  Current Outpatient Medications   Medication Sig Dispense Refill   • SUMAtriptan (Imitrex) 100 mg tablet Take 1 tablet (100 mg total) by mouth once as needed for migraine for up to 1 dose Up to 2 doses per migraine as needed 9 tablet 0   • EPINEPHrine (EPIPEN) 0 3 mg/0 3 mL SOAJ Inject 0 3 mL (0 3 mg total) into a muscle once for 1 dose 0 6 mL 0   • levonorgestrel-ethinyl estradiol (Quasense) 0 15-0 03 MG per tablet Take 1 tablet by mouth daily 91 tablet 3     No current facility-administered medications for this visit       Past Medical History:   Diagnosis Date   • Migraine    • Ovarian cyst    • Palpitations    • Renal disorder     acute kidney injury   • Tachycardia    • Uterine fibroid    • Uterine leiomyoma     Last assessed 8/28/2017     Past Surgical History:   Procedure Laterality Date   • APPENDECTOMY     • CERVICAL BIOPSY  W/ LOOP ELECTRODE EXCISION      Last assessed 8/28/2017   • COLONOSCOPY     • COLONOSCOPY WITH FECAL TRANSPLANT     • COLPOSCOPY     • DENTAL SURGERY     • DILATION AND CURETTAGE OF UTERUS      Last assessed 8/28/2017   • ESOPHAGOGASTRODUODENOSCOPY     • OVARIAN CYST REMOVAL      Last assessed 8/28/2017   • TONSILLECTOMY       Social History     Socioeconomic History   • Marital status: /Civil Union     Spouse name: None   • Number of children: None   • Years of education: None   • Highest education level: None   Occupational History   • Occupation: Nurse     Employer: [employer]   Tobacco Use   • Smoking status: Never   • Smokeless tobacco: Never   Vaping Use   • Vaping Use: Never used   Substance and Sexual Activity   • Alcohol use: Not Currently     Alcohol/week: 1 0 standard drink     Types: 1 Glasses of wine per week     Comment: socially   • Drug use: No   • Sexual activity: Yes     Partners: Male     Birth control/protection: None   Other Topics Concern   • None   Social History Narrative   • None     Social Determinants of Health     Financial Resource Strain: Not on file   Food Insecurity: Not on file   Transportation Needs: Not on file   Physical Activity: Not on file   Stress: Not on file   Social Connections: Not on file   Intimate Partner Violence: Not on file   Housing Stability: Not on file     Family History   Problem Relation Age of Onset   • Other Mother         Cardiac Disorder   • Heart attack Mother    • Arrhythmia Mother    • Coronary artery disease Mother    • Hyperlipidemia Mother    • Aortic aneurysm Father         Abdomincal aortic aneurysm   • Hypertension Father    • Clotting disorder Father    • Cerebral aneurysm Family    • Heart failure Paternal Grandmother    • Stroke Neg Hx    • Cancer Neg Hx    • Colon cancer Neg Hx    • Breast cancer Neg Hx    • Ovarian cancer Neg Hx             PHYSICAL EXAM:    Vitals:    01/19/23 1502   BP: 138/84   Pulse: 85   Weight: 59 9 kg (132 lb)   Height: 5' 8" (1 727 m)     General Appearance:   Alert and oriented x 3  Cooperative, and in no respiratory distress   HEENT:   Normocephalic, atraumatic, anicteric      Neck:  Supple, symmetrical, trachea midline   Lungs:   Clear to auscultation bilaterally    Heart[de-identified]   Regular rate and rhythm   Abdomen:   Soft, non-tender, non-distended; normal bowel sounds; no masses, no organomegaly    Genitalia:   Deferred    Rectal:   Deferred    Extremities:  No cyanosis, clubbing or edema    Pulses:  2+ and symmetric all extremities    Skin:  Skin color, texture, turgor normal, no rashes or lesions    Lymph nodes:  No palpable cervical or supraclavicular lymphadenopathy        Lab Results:             Invalid input(s): LABALBU            Imaging Studies: I have personally reviewed pertinent imaging studies      CT abdomen pelvis wo contrast    Result Date: 6/21/2022  Impression: No evidence of acute intra-abdominal or pelvic process  If there is persistent concern for acute pelvic process, recommend ultrasound  Workstation performed: [id number]       ASSESSMENT and PLAN:      1) Likely IBS - Patient reports episode of alternating diarrhea and constipation, now with soft incomplete bowel movements  She has history of diverticulosis as well  We discussed the natural course of diverticulosis, and that we recommend a high fiber diet  Fortunately, her stools were negative for C diff and other enteric pathogens which gave patient tremendous relief  - Fiber supplement twice daily  - Patient reports she trialed probiotic in the past, and did not experience positive effects   - Due to history of recurrent C diff status post FMT, she will need to be on lifelong vancomycin prophylaxis if she were to be on an antibiotic in the future  Encouraged patient to call the office when this occurs, and to make other providers aware of her history of she is treated outside our health system  Follow up if not improvement, otherwise annually

## 2023-03-15 DIAGNOSIS — G43.909 MIGRAINE: ICD-10-CM

## 2023-03-16 RX ORDER — SUMATRIPTAN 100 MG/1
100 TABLET, FILM COATED ORAL ONCE AS NEEDED
Qty: 9 TABLET | Refills: 0 | Status: SHIPPED | OUTPATIENT
Start: 2023-03-16

## 2023-04-26 ENCOUNTER — OFFICE VISIT (OUTPATIENT)
Dept: INTERNAL MEDICINE CLINIC | Facility: CLINIC | Age: 43
End: 2023-04-26

## 2023-04-26 VITALS
WEIGHT: 136.6 LBS | TEMPERATURE: 98 F | OXYGEN SATURATION: 98 % | HEIGHT: 68 IN | SYSTOLIC BLOOD PRESSURE: 102 MMHG | DIASTOLIC BLOOD PRESSURE: 76 MMHG | HEART RATE: 83 BPM | BODY MASS INDEX: 20.7 KG/M2

## 2023-04-26 DIAGNOSIS — Z13.21 ENCOUNTER FOR VITAMIN DEFICIENCY SCREENING: ICD-10-CM

## 2023-04-26 DIAGNOSIS — E78.2 MIXED HYPERLIPIDEMIA: ICD-10-CM

## 2023-04-26 DIAGNOSIS — R25.2 MUSCLE CRAMPS: ICD-10-CM

## 2023-04-26 DIAGNOSIS — Z12.31 ENCOUNTER FOR SCREENING MAMMOGRAM FOR MALIGNANT NEOPLASM OF BREAST: ICD-10-CM

## 2023-04-26 DIAGNOSIS — T61.91XA ALLERGIC REACTION TO FISH: ICD-10-CM

## 2023-04-26 DIAGNOSIS — Z00.00 ROUTINE ADULT HEALTH MAINTENANCE: Primary | ICD-10-CM

## 2023-04-26 PROBLEM — R53.81 MALAISE AND FATIGUE: Status: RESOLVED | Noted: 2018-10-01 | Resolved: 2023-04-26

## 2023-04-26 PROBLEM — R22.2 LUMP IN CHEST: Status: RESOLVED | Noted: 2021-08-26 | Resolved: 2023-04-26

## 2023-04-26 PROBLEM — R53.83 MALAISE AND FATIGUE: Status: RESOLVED | Noted: 2018-10-01 | Resolved: 2023-04-26

## 2023-04-26 PROBLEM — M79.673 PAIN OF PLANTAR ASPECT OF HEEL: Status: RESOLVED | Noted: 2017-06-08 | Resolved: 2023-04-26

## 2023-04-26 PROBLEM — J45.990 BRONCHOSPASM, EXERCISE-INDUCED: Status: RESOLVED | Noted: 2017-08-28 | Resolved: 2023-04-26

## 2023-04-26 PROBLEM — R10.31 RIGHT LOWER QUADRANT ABDOMINAL PAIN: Status: RESOLVED | Noted: 2022-06-16 | Resolved: 2023-04-26

## 2023-04-26 RX ORDER — EPINEPHRINE 0.3 MG/.3ML
0.3 INJECTION SUBCUTANEOUS ONCE
Qty: 0.6 ML | Refills: 0 | Status: SHIPPED | OUTPATIENT
Start: 2023-04-26 | End: 2023-04-26

## 2023-04-26 RX ORDER — CYCLOBENZAPRINE HCL 5 MG
5 TABLET ORAL
Qty: 30 TABLET | Refills: 0 | Status: SHIPPED | OUTPATIENT
Start: 2023-04-26

## 2023-04-26 NOTE — PROGRESS NOTES
Name: Sawyer Lee      : 1980      MRN: [id number]  Encounter Provider: ERMA Sanchez  Encounter Date: 2023   Encounter department: [address] [place] [address] W  Will repeat fasting labs with vitamin levels  Will start on a mild muscle relaxer at night  Will give script for mammogram and will follow up with GYN  Will follow up in one year or sooner if need be  1  Routine adult health maintenance  -     Comprehensive metabolic panel; Future  -     CBC and differential; Future  -     TSH, 3rd generation with Free T4 reflex; Future    2  Encounter for screening mammogram for malignant neoplasm of breast  -     Mammo screening bilateral w 3d & cad; Future; Expected date: 2023  -     Comprehensive metabolic panel; Future  -     CBC and differential; Future  -     TSH, 3rd generation with Free T4 reflex; Future    3  Mixed hyperlipidemia  -     Comprehensive metabolic panel; Future  -     CBC and differential; Future  -     TSH, 3rd generation with Free T4 reflex; Future  -     Lipid panel; Future    4  Muscle cramps  -     cyclobenzaprine (FLEXERIL) 5 mg tablet; Take 1 tablet (5 mg total) by mouth daily at bedtime    5  Encounter for vitamin deficiency screening  -     Vitamin D 25 hydroxy; Future  -     Magnesium; Future  -     Vitamin B12; Future    6  Allergic reaction to fish  -     EPINEPHrine (EPIPEN) 0 3 mg/0 3 mL SOAJ; Inject 0 3 mL (0 3 mg total) into a muscle once for 1 dose           Ashlie      Donna Alcala is for a wellness  She is doing well and did recently start with muscle cramps at night  She states it did start in her thighs and did start in her arm and chest  She is very healthy works out and does eat a healthy diet  She would like labs done  She denies any chest pain, SOB, or palpitations  She denies any depression or anxiety  She will need a mammogram and does see GYN  She did get the covid and flu vaccines   She offers no other "issues  Review of Systems   Musculoskeletal: Positive for arthralgias and myalgias  All other systems reviewed and are negative  Current Outpatient Medications on File Prior to Visit   Medication Sig    SUMAtriptan (Imitrex) 100 mg tablet Take 1 tablet (100 mg total) by mouth once as needed for migraine for up to 1 dose Up to 2 doses per migraine as needed    [DISCONTINUED] EPINEPHrine (EPIPEN) 0 3 mg/0 3 mL SOAJ Inject 0 3 mL (0 3 mg total) into a muscle once for 1 dose    [DISCONTINUED] Setlakin 0 15-0 03 MG per tablet take 1 tablet by mouth once daily (Patient not taking: Reported on 4/26/2023)       Objective     /76   Pulse 83   Temp 98 °F (36 7 °C) (Temporal)   Ht 5' 8\" (1 727 m)   Wt 62 kg (136 lb 9 6 oz)   SpO2 98%   BMI 20 77 kg/m²     Physical Exam  Vitals reviewed  Constitutional:       Appearance: Normal appearance  She is normal weight  HENT:      Head: Normocephalic and atraumatic  Right Ear: Tympanic membrane, ear canal and external ear normal       Left Ear: Tympanic membrane, ear canal and external ear normal       Nose: Nose normal       Mouth/Throat:      Mouth: Mucous membranes are moist       Pharynx: Oropharynx is clear  Eyes:      Extraocular Movements: Extraocular movements intact  Conjunctiva/sclera: Conjunctivae normal       Pupils: Pupils are equal, round, and reactive to light  Cardiovascular:      Rate and Rhythm: Normal rate and regular rhythm  Pulses: Normal pulses  Heart sounds: Normal heart sounds  Pulmonary:      Effort: Pulmonary effort is normal       Breath sounds: Normal breath sounds  Abdominal:      General: Abdomen is flat  Bowel sounds are normal       Palpations: Abdomen is soft  Musculoskeletal:         General: Normal range of motion  Cervical back: Normal range of motion and neck supple  Skin:     General: Skin is warm and dry  Capillary Refill: Capillary refill takes less than 2 seconds   " Neurological:      General: No focal deficit present  Mental Status: She is alert and oriented to person, place, and time  Mental status is at baseline  Psychiatric:         Mood and Affect: Mood normal          Behavior: Behavior normal          Thought Content:  Thought content normal          Judgment: Judgment normal        ERMA Rodriguez

## 2023-04-28 ENCOUNTER — APPOINTMENT (OUTPATIENT)
Dept: LAB | Facility: CLINIC | Age: 43
End: 2023-04-28

## 2023-04-28 DIAGNOSIS — Z13.21 ENCOUNTER FOR VITAMIN DEFICIENCY SCREENING: ICD-10-CM

## 2023-04-28 DIAGNOSIS — E78.2 MIXED HYPERLIPIDEMIA: ICD-10-CM

## 2023-04-28 DIAGNOSIS — Z12.31 ENCOUNTER FOR SCREENING MAMMOGRAM FOR MALIGNANT NEOPLASM OF BREAST: ICD-10-CM

## 2023-04-28 DIAGNOSIS — Z00.00 ROUTINE ADULT HEALTH MAINTENANCE: ICD-10-CM

## 2023-04-28 LAB
25(OH)D3 SERPL-MCNC: 38.3 NG/ML (ref 30–100)
ALBUMIN SERPL BCP-MCNC: 3.6 G/DL (ref 3.5–5)
ALP SERPL-CCNC: 49 U/L (ref 46–116)
ALT SERPL W P-5'-P-CCNC: 12 U/L (ref 12–78)
ANION GAP SERPL CALCULATED.3IONS-SCNC: 3 MMOL/L (ref 4–13)
AST SERPL W P-5'-P-CCNC: 10 U/L (ref 5–45)
BASOPHILS # BLD AUTO: 0.03 THOUSANDS/ΜL (ref 0–0.1)
BASOPHILS NFR BLD AUTO: 0 % (ref 0–1)
BILIRUB SERPL-MCNC: 0.39 MG/DL (ref 0.2–1)
BUN SERPL-MCNC: 8 MG/DL (ref 5–25)
CALCIUM SERPL-MCNC: 8.6 MG/DL (ref 8.3–10.1)
CHLORIDE SERPL-SCNC: 110 MMOL/L (ref 96–108)
CHOLEST SERPL-MCNC: 198 MG/DL
CO2 SERPL-SCNC: 24 MMOL/L (ref 21–32)
CREAT SERPL-MCNC: 0.94 MG/DL (ref 0.6–1.3)
EOSINOPHIL # BLD AUTO: 0.08 THOUSAND/ΜL (ref 0–0.61)
EOSINOPHIL NFR BLD AUTO: 1 % (ref 0–6)
ERYTHROCYTE [DISTWIDTH] IN BLOOD BY AUTOMATED COUNT: 12.8 % (ref 11.6–15.1)
GFR SERPL CREATININE-BSD FRML MDRD: 76 ML/MIN/1.73SQ M
GLUCOSE P FAST SERPL-MCNC: 88 MG/DL (ref 65–99)
HCT VFR BLD AUTO: 42.7 % (ref 34.8–46.1)
HDLC SERPL-MCNC: 33 MG/DL
HGB BLD-MCNC: 14 G/DL (ref 11.5–15.4)
IMM GRANULOCYTES # BLD AUTO: 0.03 THOUSAND/UL (ref 0–0.2)
IMM GRANULOCYTES NFR BLD AUTO: 0 % (ref 0–2)
LDLC SERPL CALC-MCNC: 124 MG/DL (ref 0–100)
LYMPHOCYTES # BLD AUTO: 2.61 THOUSANDS/ΜL (ref 0.6–4.47)
LYMPHOCYTES NFR BLD AUTO: 30 % (ref 14–44)
MAGNESIUM SERPL-MCNC: 2.3 MG/DL (ref 1.6–2.6)
MCH RBC QN AUTO: 30.5 PG (ref 26.8–34.3)
MCHC RBC AUTO-ENTMCNC: 32.8 G/DL (ref 31.4–37.4)
MCV RBC AUTO: 93 FL (ref 82–98)
MONOCYTES # BLD AUTO: 0.58 THOUSAND/ΜL (ref 0.17–1.22)
MONOCYTES NFR BLD AUTO: 7 % (ref 4–12)
NEUTROPHILS # BLD AUTO: 5.26 THOUSANDS/ΜL (ref 1.85–7.62)
NEUTS SEG NFR BLD AUTO: 62 % (ref 43–75)
NONHDLC SERPL-MCNC: 165 MG/DL
NRBC BLD AUTO-RTO: 0 /100 WBCS
PLATELET # BLD AUTO: 278 THOUSANDS/UL (ref 149–390)
PMV BLD AUTO: 9.6 FL (ref 8.9–12.7)
POTASSIUM SERPL-SCNC: 4.2 MMOL/L (ref 3.5–5.3)
PROT SERPL-MCNC: 6.7 G/DL (ref 6.4–8.4)
RBC # BLD AUTO: 4.59 MILLION/UL (ref 3.81–5.12)
SODIUM SERPL-SCNC: 137 MMOL/L (ref 135–147)
TRIGL SERPL-MCNC: 206 MG/DL
TSH SERPL DL<=0.05 MIU/L-ACNC: 2.81 UIU/ML (ref 0.45–4.5)
VIT B12 SERPL-MCNC: 147 PG/ML (ref 100–900)
WBC # BLD AUTO: 8.59 THOUSAND/UL (ref 4.31–10.16)

## 2023-05-01 DIAGNOSIS — E53.8 VITAMIN B12 DEFICIENCY: Primary | ICD-10-CM

## 2023-05-01 RX ORDER — NEEDLES, SAFETY 22GX1 1/2"
NEEDLE, DISPOSABLE MISCELLANEOUS
Qty: 50 EACH | Refills: 0 | Status: SHIPPED | OUTPATIENT
Start: 2023-05-01

## 2023-05-01 RX ORDER — CYANOCOBALAMIN 1000 UG/ML
1000 INJECTION, SOLUTION INTRAMUSCULAR; SUBCUTANEOUS
Qty: 1 ML | Refills: 3 | Status: SHIPPED | OUTPATIENT
Start: 2023-05-01

## 2023-05-15 ENCOUNTER — TELEPHONE (OUTPATIENT)
Dept: INTERNAL MEDICINE CLINIC | Facility: CLINIC | Age: 43
End: 2023-05-15

## 2023-05-15 NOTE — TELEPHONE ENCOUNTER
Patient sent an advice request but it was sent to Dr Jd Phillips instead  She was wondering if you can send in an updated script on the B12 stating its every two weeks so her insurance will cover the script

## 2023-05-16 ENCOUNTER — HOSPITAL ENCOUNTER (OUTPATIENT)
Dept: MAMMOGRAPHY | Facility: HOSPITAL | Age: 43
Discharge: HOME | End: 2023-05-16

## 2023-05-16 ENCOUNTER — TELEPHONE (OUTPATIENT)
Dept: INTERNAL MEDICINE CLINIC | Facility: CLINIC | Age: 43
End: 2023-05-16

## 2023-05-16 VITALS — BODY MASS INDEX: 20.7 KG/M2 | WEIGHT: 136.6 LBS | HEIGHT: 68 IN

## 2023-05-16 DIAGNOSIS — E53.8 VITAMIN B12 DEFICIENCY: ICD-10-CM

## 2023-05-16 DIAGNOSIS — Z12.31 ENCOUNTER FOR SCREENING MAMMOGRAM FOR MALIGNANT NEOPLASM OF BREAST: ICD-10-CM

## 2023-05-16 RX ORDER — CYANOCOBALAMIN 1000 UG/ML
1000 INJECTION, SOLUTION INTRAMUSCULAR; SUBCUTANEOUS
Qty: 1 ML | Refills: 3 | Status: SHIPPED | OUTPATIENT
Start: 2023-05-16 | End: 2023-05-21 | Stop reason: SDUPTHER

## 2023-05-21 DIAGNOSIS — E53.8 VITAMIN B12 DEFICIENCY: ICD-10-CM

## 2023-05-21 RX ORDER — CYANOCOBALAMIN 1000 UG/ML
1000 INJECTION, SOLUTION INTRAMUSCULAR; SUBCUTANEOUS
Qty: 2 ML | Refills: 3 | Status: SHIPPED | OUTPATIENT
Start: 2023-05-21 | End: 2023-07-27 | Stop reason: SDUPTHER

## 2023-06-25 PROBLEM — Z00.00 ROUTINE ADULT HEALTH MAINTENANCE: Status: RESOLVED | Noted: 2023-04-26 | Resolved: 2023-06-25

## 2023-06-29 ENCOUNTER — ANNUAL EXAM (OUTPATIENT)
Dept: GYNECOLOGY | Facility: CLINIC | Age: 43
End: 2023-06-29
Payer: COMMERCIAL

## 2023-06-29 VITALS
DIASTOLIC BLOOD PRESSURE: 60 MMHG | WEIGHT: 144.6 LBS | HEART RATE: 90 BPM | HEIGHT: 68 IN | BODY MASS INDEX: 21.92 KG/M2 | SYSTOLIC BLOOD PRESSURE: 112 MMHG

## 2023-06-29 DIAGNOSIS — Z01.419 ENCOUNTER FOR GYNECOLOGICAL EXAMINATION WITHOUT ABNORMAL FINDING: ICD-10-CM

## 2023-06-29 DIAGNOSIS — N94.6 DYSMENORRHEA: Primary | ICD-10-CM

## 2023-06-29 PROCEDURE — G0145 SCR C/V CYTO,THINLAYER,RESCR: HCPCS | Performed by: OBSTETRICS & GYNECOLOGY

## 2023-06-29 PROCEDURE — S0612 ANNUAL GYNECOLOGICAL EXAMINA: HCPCS | Performed by: OBSTETRICS & GYNECOLOGY

## 2023-06-29 RX ORDER — LEVONORGESTREL AND ETHINYL ESTRADIOL 0.15-0.03
1 KIT ORAL DAILY
Qty: 91 TABLET | Refills: 3 | Status: SHIPPED | OUTPATIENT
Start: 2023-06-29 | End: 2023-09-28

## 2023-06-29 NOTE — PROGRESS NOTES
Assessment/Plan:         Diagnoses and all orders for this visit:    Encounter for gynecological examination without abnormal finding      Dysmenorrhea/menorrhagia: Continue OCPs    Subjective:      Patient ID: Iker Lara is a 38 y o  female  HPI patient presents for annual examination  She offers no complaints  She is present in oral contraceptives for control of dysmenorrhea and menorrhagia  This is working well  Of note is that her  had a vasectomy reversal 3 years ago  She also had 1 cycle of IVF  They  discontinued fertility treatments  She denies any dysuria, hematuria urgency or urinary incontinence  No GI complaints  The following portions of the patient's history were reviewed and updated as appropriate:   She  has a past medical history of Abnormal Pap smear of cervix, Female infertility, Migraine, Ovarian cyst, Palpitations, Renal disorder, Tachycardia, Uterine fibroid, and Uterine leiomyoma  She   Patient Active Problem List    Diagnosis Date Noted   • Encounter for screening mammogram for malignant neoplasm of breast 04/26/2023   • Muscle cramps 04/26/2023   • Encounter for vitamin deficiency screening 04/26/2023   • Allergic reaction to fish 06/16/2022   • Oral thrush 08/26/2021   • Elevated WBC count 08/26/2021   • Irritable bowel syndrome with diarrhea 03/30/2021   • Hyperlipidemia 03/23/2021   • 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   • Excessive weight loss 03/23/2021   • Increased stomach acid 02/01/2019   • History of migraine 09/27/2018   • Cyst of right ovary 09/15/2017   • Cervical dysplasia 08/28/2017   • Chronic diarrhea 08/28/2017   • Migraines 08/28/2017     She  has a past surgical history that includes Tonsillectomy; Appendectomy; Cervical biopsy w/ loop electrode excision; Colonoscopy; Dental surgery; Esophagogastroduodenoscopy; Dilation and curettage of uterus; Ovarian cyst removal; Colposcopy; and Colonoscopy with Fecal Transplant    Her "family history includes Abdominal aortic aneurysm in her father; Arrhythmia in her mother; Clotting disorder in her father; Coronary artery disease in her mother; Deep vein thrombosis in her father; Diabetes in her mother; Heart attack in her mother; Heart failure in her paternal grandmother; Hyperlipidemia in her mother; Hypertension in her father; No Known Problems in her half-sister, half-sister, maternal aunt, maternal grandfather, maternal grandmother, paternal aunt, paternal aunt, and paternal grandfather; Other in her mother  She  reports that she has never smoked  She has never used smokeless tobacco  She reports that she does not currently use alcohol after a past usage of about 1 0 standard drink of alcohol per week  She reports that she does not use drugs  Current Outpatient Medications   Medication Sig Dispense Refill   • cyanocobalamin 1,000 mcg/mL Inject 1 mL (1,000 mcg total) into a muscle every 14 (fourteen) days 2 mL 3   • SYRINGE-NEEDLE, DISP, 3 ML (B-D SYRINGE/NEEDLE 3CC/25GX5/8) 25G X 5/8\" 3 ML MISC Use every 30 (thirty) days 50 each 0   • cyclobenzaprine (FLEXERIL) 5 mg tablet Take 1 tablet (5 mg total) by mouth daily at bedtime (Patient not taking: Reported on 6/29/2023) 30 tablet 0   • EPINEPHrine (EPIPEN) 0 3 mg/0 3 mL SOAJ Inject 0 3 mL (0 3 mg total) into a muscle once for 1 dose (Patient not taking: Reported on 6/29/2023) 0 6 mL 0   • SUMAtriptan (Imitrex) 100 mg tablet Take 1 tablet (100 mg total) by mouth once as needed for migraine for up to 1 dose Up to 2 doses per migraine as needed (Patient not taking: Reported on 6/29/2023) 9 tablet 0     No current facility-administered medications for this visit       Current Outpatient Medications on File Prior to Visit   Medication Sig   • cyanocobalamin 1,000 mcg/mL Inject 1 mL (1,000 mcg total) into a muscle every 14 (fourteen) days   • SYRINGE-NEEDLE, DISP, 3 ML (B-D SYRINGE/NEEDLE 3CC/25GX5/8) 25G X 5/8\" 3 ML MISC Use every 30 (thirty) days " "  • cyclobenzaprine (FLEXERIL) 5 mg tablet Take 1 tablet (5 mg total) by mouth daily at bedtime (Patient not taking: Reported on 6/29/2023)   • EPINEPHrine (EPIPEN) 0 3 mg/0 3 mL SOAJ Inject 0 3 mL (0 3 mg total) into a muscle once for 1 dose (Patient not taking: Reported on 6/29/2023)   • SUMAtriptan (Imitrex) 100 mg tablet Take 1 tablet (100 mg total) by mouth once as needed for migraine for up to 1 dose Up to 2 doses per migraine as needed (Patient not taking: Reported on 6/29/2023)     No current facility-administered medications on file prior to visit  She is allergic to hydrocodone, morphine, morphine and related, oxycodone, penicillins, clomid [clomiphene], and sulfa antibiotics  Review of Systems   Constitutional: Negative  HENT: Negative for sore throat and trouble swallowing  Gastrointestinal: Negative  Genitourinary: Negative  Objective:      /60   Pulse 90   Ht 5' 8\" (1 727 m)   Wt 65 6 kg (144 lb 9 6 oz)   BMI 21 99 kg/m²          Physical Exam  Vitals reviewed  Constitutional:       Appearance: Normal appearance  She is normal weight  Cardiovascular:      Rate and Rhythm: Normal rate and regular rhythm  Pulses: Normal pulses  Heart sounds: Normal heart sounds  No murmur heard  Pulmonary:      Effort: Pulmonary effort is normal  No respiratory distress  Breath sounds: Normal breath sounds  Chest:   Breasts:     Right: No swelling, bleeding, inverted nipple, mass, nipple discharge, skin change or tenderness  Left: No swelling, bleeding, inverted nipple, mass, nipple discharge, skin change or tenderness  Abdominal:      General: There is no distension  Palpations: Abdomen is soft  There is no mass  Tenderness: There is no abdominal tenderness  There is no guarding or rebound  Hernia: No hernia is present  There is no hernia in the left inguinal area or right inguinal area  Genitourinary:     General: Normal vulva        " Labia:         Right: No rash, tenderness or lesion  Left: No rash, tenderness or lesion  Vagina: Normal       Cervix: Normal       Uterus: Normal        Adnexa:         Right: No mass, tenderness or fullness  Left: No mass, tenderness or fullness  Musculoskeletal:      Cervical back: Normal range of motion and neck supple  Lymphadenopathy:      Upper Body:      Right upper body: No supraclavicular, axillary or pectoral adenopathy  Left upper body: No supraclavicular, axillary or pectoral adenopathy  Lower Body: No right inguinal adenopathy  No left inguinal adenopathy  Neurological:      Mental Status: She is alert

## 2023-07-06 LAB
LAB AP GYN PRIMARY INTERPRETATION: NORMAL
Lab: NORMAL

## 2023-07-07 NOTE — TELEPHONE ENCOUNTER
Called and left message for patient that I scheduled an appointment for her with Dr Salvador Roberts in [facility] today 12/13 at 1pm  Eric Wright Patient Age: 18 year old  MESSAGE: Interpreting service used: No    Insurance on file confirmed with caller: Yes    Pediatrics- Reason for call: Symptom- Yellow Symptom-   Headache-THREE DAYS            Appointment: No available appointments in time frame requested.      Timeframe:  ASAP     Patient prefers to be seen at: [facility]    Patient requesting: PCP Only      Caller connected to triage- Yes- [facility]- Connect call to Hotline  and route message to Provider's Clinical Support Pool        Message read back to caller for accuracy: Yes       ALLERGIES:  Patient has no known allergies.  Current Outpatient Medications   Medication Sig Dispense Refill   • tretinoin (RETIN-A) 0.05 % cream Apply nightly as a pea-size dose of medication to each of the five areas of the face (central forehead, chin, nose, and cheeks) 20 g 11   • clindamycin (CLEOCIN T) 1 % lotion Apply twice daily to the affected areas as directed 120 mL 11   • minocycline (MINOCIN) 100 MG capsule Take one pill once daily; to minimize stomach upset take with food; do NOT consume with dairy products 30 capsule 3     No current facility-administered medications for this visit.     PHARMACY to use:           Pharmacy preference(s) on file:   [id number] - [place], [place] - [address] [facility] [address] [place] [address]  [facility] [address]  Phone: [phone] Fax: [phone]    [id number] - [place], [place] - [address] [facility] AT [address]  [address] [facility] [address]  Phone: [phone] Fax: [phone]    [facility] [place], [place] - [address]  [address] [place] [address]  Phone: [phone] Fax: [phone]      CALL BACK INFO: Ok to leave response (including medical information) on answering machine      PCP: Mo Cotton MD         INS: Payor: BLUE [employer]OSS [employer]UE [employer] [place] / Plan: BLUE [id number] / Product Type: PPO MISC   PATIENT  ADDRESS:  [address] [place] [id number]

## 2023-07-26 ENCOUNTER — APPOINTMENT (OUTPATIENT)
Dept: LAB | Facility: CLINIC | Age: 43
End: 2023-07-26
Payer: COMMERCIAL

## 2023-07-26 DIAGNOSIS — E53.8 VITAMIN B12 DEFICIENCY: ICD-10-CM

## 2023-07-26 DIAGNOSIS — E78.2 MIXED HYPERLIPIDEMIA: ICD-10-CM

## 2023-07-26 DIAGNOSIS — E53.8 VITAMIN B12 DEFICIENCY: Primary | ICD-10-CM

## 2023-07-26 PROCEDURE — 36415 COLL VENOUS BLD VENIPUNCTURE: CPT

## 2023-07-26 PROCEDURE — 80053 COMPREHEN METABOLIC PANEL: CPT

## 2023-07-26 PROCEDURE — 82607 VITAMIN B-12: CPT

## 2023-07-27 DIAGNOSIS — E53.8 VITAMIN B12 DEFICIENCY: ICD-10-CM

## 2023-07-27 LAB
ALBUMIN SERPL BCP-MCNC: 3.7 G/DL (ref 3.5–5)
ALP SERPL-CCNC: 61 U/L (ref 46–116)
ALT SERPL W P-5'-P-CCNC: 15 U/L (ref 12–78)
ANION GAP SERPL CALCULATED.3IONS-SCNC: 3 MMOL/L
AST SERPL W P-5'-P-CCNC: 12 U/L (ref 5–45)
BILIRUB SERPL-MCNC: 0.68 MG/DL (ref 0.2–1)
BUN SERPL-MCNC: 7 MG/DL (ref 5–25)
CALCIUM SERPL-MCNC: 9 MG/DL (ref 8.3–10.1)
CHLORIDE SERPL-SCNC: 108 MMOL/L (ref 96–108)
CO2 SERPL-SCNC: 26 MMOL/L (ref 21–32)
CREAT SERPL-MCNC: 0.91 MG/DL (ref 0.6–1.3)
GFR SERPL CREATININE-BSD FRML MDRD: 79 ML/MIN/1.73SQ M
GLUCOSE SERPL-MCNC: 112 MG/DL (ref 65–140)
POTASSIUM SERPL-SCNC: 3.9 MMOL/L (ref 3.5–5.3)
PROT SERPL-MCNC: 7 G/DL (ref 6.4–8.4)
SODIUM SERPL-SCNC: 137 MMOL/L (ref 135–147)
VIT B12 SERPL-MCNC: 271 PG/ML (ref 180–914)

## 2023-07-27 RX ORDER — CYANOCOBALAMIN 1000 UG/ML
1000 INJECTION, SOLUTION INTRAMUSCULAR; SUBCUTANEOUS WEEKLY
Qty: 12 ML | Refills: 0 | Status: SHIPPED | OUTPATIENT
Start: 2023-07-27

## 2023-07-27 RX ORDER — NEEDLES, SAFETY 22GX1 1/2"
NEEDLE, DISPOSABLE MISCELLANEOUS WEEKLY
Qty: 12 EACH | Refills: 0 | Status: SHIPPED | OUTPATIENT
Start: 2023-07-27

## 2023-07-27 NOTE — TELEPHONE ENCOUNTER
As discussed, advised patient to start taking B12 injections weekly x 1 month, check in with us and she may need to go another month with weekly injections.

## 2023-10-02 DIAGNOSIS — E53.8 VITAMIN B12 DEFICIENCY: Primary | ICD-10-CM

## 2023-10-07 ENCOUNTER — APPOINTMENT (OUTPATIENT)
Dept: LAB | Facility: CLINIC | Age: 43
End: 2023-10-07
Payer: COMMERCIAL

## 2023-10-07 DIAGNOSIS — E53.8 VITAMIN B12 DEFICIENCY: ICD-10-CM

## 2023-10-07 LAB — VIT B12 SERPL-MCNC: 524 PG/ML (ref 180–914)

## 2023-10-07 PROCEDURE — 82607 VITAMIN B-12: CPT

## 2023-10-07 PROCEDURE — 36415 COLL VENOUS BLD VENIPUNCTURE: CPT

## 2023-11-09 ENCOUNTER — APPOINTMENT (OUTPATIENT)
Dept: URGENT CARE | Facility: CLINIC | Age: 43
End: 2023-11-09
Payer: OTHER MISCELLANEOUS

## 2023-11-09 PROCEDURE — 99283 EMERGENCY DEPT VISIT LOW MDM: CPT

## 2023-11-09 PROCEDURE — G0382 LEV 3 HOSP TYPE B ED VISIT: HCPCS

## 2023-11-14 ENCOUNTER — EVALUATION (OUTPATIENT)
Dept: PHYSICAL THERAPY | Facility: CLINIC | Age: 43
End: 2023-11-14
Payer: OTHER MISCELLANEOUS

## 2023-11-14 DIAGNOSIS — M25.611 DECREASED RIGHT SHOULDER RANGE OF MOTION: Primary | ICD-10-CM

## 2023-11-14 PROCEDURE — 97140 MANUAL THERAPY 1/> REGIONS: CPT

## 2023-11-14 PROCEDURE — 97162 PT EVAL MOD COMPLEX 30 MIN: CPT

## 2023-11-14 PROCEDURE — 97110 THERAPEUTIC EXERCISES: CPT

## 2023-11-14 NOTE — PROGRESS NOTES
PT Evaluation     Today's date: 23  Patient name: Min Espinal  : 1980  MRN: [id number]  Referring provider: ERMA Liu  Dx:   Encounter Diagnosis     ICD-10-CM    1. Decreased right shoulder range of motion  M25.611                      Assessment  Assessment details: Min Espinal is a 41 y.o. female presenting to outpatient physical therapy with noted impairments including pain, impaired soft tissue mobility, reduced range of motion, reduced strength, reduced postural awareness, and reduced activity tolerance. Signs and symptoms at present are consistent with referring diagnosis of decreased ROM R SHLD with pain s/p flu shot. Due to noted impairments, the patient's present functional limitations include difficulty with ADLs with increased need for assistance, reliance on medication and/or modalities for pain relief, reduced tolerance for functional mobility and activity, and difficulty completing HH, self care and work responsibilities. Patient to benefit from skilled outpatient physical therapy 2x/week for 6 weeks in order to reduce pain, maximize pain free range of motion, increase strength and stability, and improve functional mobility/functional activity in order to maximize return to prior level of function with reduced limitations. Home exercise program was provided and all questions answered to patient's level of satisfaction. Thank you for your referral.        Impairments: abnormal or restricted ROM, abnormal movement, activity intolerance, impaired physical strength and lacks appropriate home exercise program  Understanding of Dx/Px/POC: good  Goals  STGs to be achieved in 4 weeks:  1. Pt to demonstrate reduced subjective pain rating "at worst" by at least 2-3 points from Initial Eval in order to allow for reduced pain with ADLs and improved functional activity tolerance.    2. Pt to demonstrate increased AROM of R shld by at least 5-10 degrees in order to allow for greater ease and independence with ADLs and functional mobility. 3. Pt to demonstrate increased MMT of R shld by at least 1/2-1 grade in order to improve safety and stability with ADLs and functional mobility. LTGs to be achieved in 6-8 weeks:  1. Pt will be I with HEP in order to continue to improve quality of life and independence and reduce risk for re-injury. 2. Pt to demonstrate return to PLOF without limitations or restrictions. 3. Pt to demonstrate improved function as noted by achieving or exceeding predicted score on FOTO outcomes assessment tool. Plan  Patient would benefit from: skilled physical therapy  Other planned modality interventions: Modalities prn for symptom management  Planned therapy interventions: manual therapy, neuromuscular re-education, therapeutic activities, therapeutic exercise, strengthening, stretching and home exercise program  Frequency: 2x week  Duration in weeks: 6  Plan of Care beginning date: 11/14/2023  Plan of Care expiration date: 12/26/2023  Treatment plan discussed with: PTA and patient    Subjective Evaluation    History of Present Illness  Mechanism of injury: Pt had the flu shot 11/1/23 and had immediate pain in her R shld.with inability to raise her arm overhead States the shot didn't feel like it was injected into muscle. Shot was given in subacromial bursa area. Began to have difficulty with AROM and had severe pain to the point she thought she would vomit or have to go to the ER. Notes she had severe itching of the shld and entire arm for hours after the injection. .  Currently has pain 0/10 at rest, 5/10 with movement. Notes as of Sunday it was a 10/10. Pt did take 20 mg of prednisone on Sunday which resolved the pain but it gradually returned. Min pain with palpation, of subacromial bursa. Pt has been able to sleep on the R side.           Not a recurrent problem   Quality of life: good    Patient Goals  Patient goals for therapy: decreased pain, increased strength, increased motion and independence with ADLs/IADLs  Patient goal: hunting, climb the tree stand, return to exercise, hold the bow  Pain  Current pain ratin  At best pain ratin  At worst pain ratin  Quality: dull ache, sharp and burning  Relieving factors: rest and support (prednisone,)  Aggravating factors: overhead activity and lifting (any movement)  Progression: improved    Social Support  Lives with: spouse    Employment status: working (nurse for [employer])  Hand dominance: right    Treatments  No previous or current treatments      Objective     Tenderness     Right Shoulder  Tenderness in the subacromial bursa. Cervical/Thoracic Screen   Cervical range of motion within normal limits  Cervical range of motion within normal limits with the following exceptions: But feeling tight RUT area and R cerv area    Neurological Testing     Sensation     Shoulder   Left Shoulder   Intact: light touch    Right Shoulder   Intact: Light touch    Active Range of Motion     Right Shoulder   Flexion: 63 degrees   Abduction: 110 degrees   External rotation BTH: T1   Internal rotation BTB: T5     Strength/Myotome Testing     Right Shoulder     Planes of Motion   Flexion: 2+   Abduction: 3-   External rotation at 90°: 3-   Internal rotation at 90°: 4     Isolated Muscles   Biceps: 5     Tests     Right Shoulder   Positive painful arc. Negative belly press and drop arm.               Precautions: tachycardia    Re-eval Date: 23    Date        Visit Count 1       FOTO Comp        Pain In See eval       Pain Out improved             Manuals        PROM R Shld 10'                               Neuro Re-Ed                                                                Ther Ex        pendulum Rev for HEP       Wand ex  Flex,ext,abd, IR Rev for HEP       Wall slides  Flex/abd Rev for HEP       UBE        pulleys        Finger ladder                        Ther Activity Gait Training                        Modalities

## 2023-11-15 ENCOUNTER — APPOINTMENT (OUTPATIENT)
Dept: URGENT CARE | Facility: MEDICAL CENTER | Age: 43
End: 2023-11-15
Payer: OTHER MISCELLANEOUS

## 2023-11-15 ENCOUNTER — OFFICE VISIT (OUTPATIENT)
Dept: PHYSICAL THERAPY | Facility: CLINIC | Age: 43
End: 2023-11-15
Payer: OTHER MISCELLANEOUS

## 2023-11-15 DIAGNOSIS — M25.611 DECREASED RIGHT SHOULDER RANGE OF MOTION: Primary | ICD-10-CM

## 2023-11-15 PROCEDURE — 97035 APP MDLTY 1+ULTRASOUND EA 15: CPT

## 2023-11-15 PROCEDURE — 97140 MANUAL THERAPY 1/> REGIONS: CPT

## 2023-11-15 PROCEDURE — 97110 THERAPEUTIC EXERCISES: CPT

## 2023-11-15 PROCEDURE — 99214 OFFICE O/P EST MOD 30 MIN: CPT

## 2023-11-15 NOTE — PROGRESS NOTES
Daily Note     Today's date: 11/15/2023  Patient name: Tracy Cooney  : 1980  MRN: [id number]  Referring provider: ERMA Mane  Dx:   Encounter Diagnosis     ICD-10-CM    1. Decreased right shoulder range of motion  M25.611                      Subjective: Pt states she awoke with the worst pain she has had to date. Called the doctor and was able to be seen by [facility] doctor. Pt will be trialing prednisone starting at 10mg every other day. Pt has no pain until she reaches 90* flex or abd.and then has arc of pain to no pain at end range. Objective: See treatment diary below      Assessment: Tolerated treatment fair. Patient exhibited good technique with therapeutic exercises and would benefit from continued PT  Pt alphonso PROM well and has arc of pain 90* to ~110*. Pt able to alphonso all ex below shld height w/o issue. AddedUS to R shld to assist in movement or absorption of flu medication. Plan: Continue per plan of care.                   Precautions: tachycardia    Re-eval Date: 23    Date 11/14 11.15.23      Visit Count 1 2      FOTO Comp        Pain In See eval       Pain Out improved         Manuals 11/14 11/15      PROM R Shld 10' 10'                              Neuro Re-Ed                                                                Ther Ex        pendulum Rev for HEP 3#  30x ea      Wand ex  Flex,ext,abd, IR Rev for HEP 20x ea      Wall slides  Flex/abd Rev for HEP 15x flex      UBE        pulleys        Finger ladder                        Ther Activity                        Gait Training                        Modalities        US to R shld  1.5 w/cm2  10'  100 %  1 MHz              11/15

## 2023-11-21 ENCOUNTER — OFFICE VISIT (OUTPATIENT)
Dept: PHYSICAL THERAPY | Facility: CLINIC | Age: 43
End: 2023-11-21
Payer: OTHER MISCELLANEOUS

## 2023-11-21 DIAGNOSIS — M25.611 DECREASED RIGHT SHOULDER RANGE OF MOTION: Primary | ICD-10-CM

## 2023-11-21 PROCEDURE — 97110 THERAPEUTIC EXERCISES: CPT

## 2023-11-21 NOTE — PROGRESS NOTES
Daily Note     Today's date: 2023  Patient name: Magnolia Spatz  : 1980  MRN: [id number]  Referring provider: ERMA Sousa  Dx:   Encounter Diagnosis     ICD-10-CM    1. Decreased right shoulder range of motion  M25.611                      Subjective:  Pt notes her pain subsided while on the steroids. Went off them yesterday and can feel pain escalating today. States she uses her L arm to assist with moving her RUE. Pt is frustrated at prospect of long recovery time. Also frustrated that injection is mandatory and now she has an issue from it. Objective: See treatment diary below      Assessment: Tolerated treatment well. Patient demonstrated fatigue post treatment, exhibited good technique with therapeutic exercises, and would benefit from continued PT  Pt has full PROM R shld and pain with AROM at shld flex 90* and above. Plan: Continue per plan of care.       Precautions: tachycardia    Re-eval Date: 23    Date 11/14 11.15.23 11/21     Visit Count 1 2 3     FOTO Comp        Pain In See eval       Pain Out improved         Manuals 11/14 11/15 11/21     PROM R Shld 10' 10' 10'                             Neuro Re-Ed        S/                                                        Ther Ex        pendulum Rev for HEP 3#  30x ea 3#  30x ea     Wand ex  Flex,ext,abd, IR Rev for HEP 20x ea 20x ea     Wall slides  Flex/abd Rev for HEP 15x flex 15x 10"     UBE   100 rpm    10' alt every 2 min     pulleys   FF 2 min     Finger ladder   10x flex                     Ther Activity                        Gait Training                        Modalities        US to R shld  1.5 w/cm2  10'  100 %  1 MHz              11/15

## 2023-11-26 DIAGNOSIS — E53.8 VITAMIN B12 DEFICIENCY: ICD-10-CM

## 2023-11-27 RX ORDER — CYANOCOBALAMIN 1000 UG/ML
1000 INJECTION, SOLUTION INTRAMUSCULAR; SUBCUTANEOUS WEEKLY
Qty: 12 ML | Refills: 0 | Status: SHIPPED | OUTPATIENT
Start: 2023-11-27

## 2023-11-28 ENCOUNTER — OFFICE VISIT (OUTPATIENT)
Dept: PHYSICAL THERAPY | Facility: CLINIC | Age: 43
End: 2023-11-28
Payer: OTHER MISCELLANEOUS

## 2023-11-28 ENCOUNTER — APPOINTMENT (OUTPATIENT)
Dept: URGENT CARE | Facility: CLINIC | Age: 43
End: 2023-11-28
Payer: OTHER MISCELLANEOUS

## 2023-11-28 DIAGNOSIS — M25.611 DECREASED RIGHT SHOULDER RANGE OF MOTION: Primary | ICD-10-CM

## 2023-11-28 PROCEDURE — 97110 THERAPEUTIC EXERCISES: CPT

## 2023-11-28 PROCEDURE — 99213 OFFICE O/P EST LOW 20 MIN: CPT

## 2023-11-28 PROCEDURE — 97140 MANUAL THERAPY 1/> REGIONS: CPT

## 2023-11-28 NOTE — PROGRESS NOTES
Daily Note     Today's date: 2023  Patient name: Vanessa Jolley  : 1980  MRN: [id number]  Referring provider: ERMA Cates  Dx:   Encounter Diagnosis     ICD-10-CM    1. Decreased right shoulder range of motion  M25.611                      Subjective: MD put me back on steroids for another 2 weeks  Wants to hold on doing MRI at this time  I did do something the other day, pulled a Gatorade out of the plastic, heard a pop and felt instant pain relief that lasted 2 days  Pain has returned, today as 5/10  Have difficulty with washing hair in am  I feel I have no strength in my arm  I have held all my strengthening exer  R shoulder feels numb and almost swollen at times that it should look double in size, but isnt        Objective: See treatment diary below      Assessment: Tolerated treatment well. Pt encourage slower/controlled AAROM exer with focus on relax during exhalation  ROM improving R shoulder  noted tenderness with palpation supraspinatus  Demon limited IR and tightness at end range of flex  ER/scap [facility]  Patient would benefit from continued PT      Plan: Continue per plan of care.       Precautions: tachycardia    Re-eval Date: 23    Date 11/14 11.15.23 11/21 11/28    Visit Count 1 2 3 4    FOTO Comp        Pain In See eval   R shoulder  5/10    Pain Out improved         Manuals 11/14 11/15 11/21     PROM R Shld 10' 10' 10' 20 min  With scap mobs/long axis distraction to pt alphonso                            Neuro Re-Ed        S/                                                        Ther Ex        pendulum Rev for HEP 3#  30x ea 3#  30x ea 3#  30x ea    Wand ex  Flex,ext,abd, IR Rev for HEP 20x ea 20x ea 20x 5"   Flex/scap/ext    IR with towel 10x 10"      Wall slides  Flex/abd Rev for HEP 15x flex 15x 10" @ home    UBE   100 rpm    10' alt every 2 min 100 rpm    10' alt every 2 min    pulleys   FF 2 min FF / scap 2 min w/ 5"      Finger ladder   10x flex Perform at home Ther Activity                        Gait Training                        Modalities        US to R shld  1.5 w/cm2  10'  100 %  1 MHz  Pt def            11/15

## 2023-11-30 ENCOUNTER — APPOINTMENT (OUTPATIENT)
Dept: PHYSICAL THERAPY | Facility: CLINIC | Age: 43
End: 2023-11-30
Payer: OTHER MISCELLANEOUS

## 2023-12-05 ENCOUNTER — OFFICE VISIT (OUTPATIENT)
Dept: PHYSICAL THERAPY | Facility: CLINIC | Age: 43
End: 2023-12-05
Payer: OTHER MISCELLANEOUS

## 2023-12-05 DIAGNOSIS — M25.611 DECREASED RIGHT SHOULDER RANGE OF MOTION: Primary | ICD-10-CM

## 2023-12-05 PROCEDURE — 97110 THERAPEUTIC EXERCISES: CPT

## 2023-12-05 PROCEDURE — 97140 MANUAL THERAPY 1/> REGIONS: CPT

## 2023-12-12 ENCOUNTER — APPOINTMENT (OUTPATIENT)
Dept: URGENT CARE | Facility: CLINIC | Age: 43
End: 2023-12-12
Payer: OTHER MISCELLANEOUS

## 2023-12-12 PROCEDURE — 99213 OFFICE O/P EST LOW 20 MIN: CPT

## 2023-12-26 ENCOUNTER — APPOINTMENT (OUTPATIENT)
Dept: URGENT CARE | Facility: CLINIC | Age: 43
End: 2023-12-26
Payer: OTHER MISCELLANEOUS

## 2023-12-26 PROCEDURE — 99213 OFFICE O/P EST LOW 20 MIN: CPT

## 2024-01-11 DIAGNOSIS — G43.909 MIGRAINE: ICD-10-CM

## 2024-01-14 RX ORDER — SUMATRIPTAN 100 MG/1
100 TABLET, FILM COATED ORAL ONCE AS NEEDED
Qty: 9 TABLET | Refills: 3 | Status: SHIPPED | OUTPATIENT
Start: 2024-01-14

## 2024-03-26 DIAGNOSIS — E53.8 VITAMIN B12 DEFICIENCY: ICD-10-CM

## 2024-03-27 RX ORDER — NEEDLES, SAFETY 22GX1 1/2"
NEEDLE, DISPOSABLE MISCELLANEOUS WEEKLY
Qty: 12 EACH | Refills: 0 | Status: SHIPPED | OUTPATIENT
Start: 2024-03-27

## 2024-03-27 RX ORDER — CYANOCOBALAMIN 1000 UG/ML
1000 INJECTION, SOLUTION INTRAMUSCULAR; SUBCUTANEOUS WEEKLY
Qty: 12 ML | Refills: 0 | Status: SHIPPED | OUTPATIENT
Start: 2024-03-27

## 2024-04-09 ENCOUNTER — OFFICE VISIT (OUTPATIENT)
Dept: INTERNAL MEDICINE CLINIC | Facility: CLINIC | Age: 44
End: 2024-04-09
Payer: COMMERCIAL

## 2024-04-09 VITALS
HEART RATE: 82 BPM | TEMPERATURE: 98.3 F | BODY MASS INDEX: 22.11 KG/M2 | WEIGHT: 145.9 LBS | OXYGEN SATURATION: 98 % | DIASTOLIC BLOOD PRESSURE: 60 MMHG | SYSTOLIC BLOOD PRESSURE: 140 MMHG | HEIGHT: 68 IN

## 2024-04-09 DIAGNOSIS — Z92.89: ICD-10-CM

## 2024-04-09 DIAGNOSIS — E53.8 VITAMIN B12 DEFICIENCY: ICD-10-CM

## 2024-04-09 DIAGNOSIS — E78.2 MIXED HYPERLIPIDEMIA: ICD-10-CM

## 2024-04-09 DIAGNOSIS — T61.91XA ALLERGIC REACTION TO FISH: ICD-10-CM

## 2024-04-09 DIAGNOSIS — Z13.0 SCREENING FOR DEFICIENCY ANEMIA: ICD-10-CM

## 2024-04-09 DIAGNOSIS — R00.2 PALPITATIONS: ICD-10-CM

## 2024-04-09 DIAGNOSIS — Z00.00 ROUTINE ADULT HEALTH MAINTENANCE: Primary | ICD-10-CM

## 2024-04-09 PROCEDURE — 99396 PREV VISIT EST AGE 40-64: CPT | Performed by: NURSE PRACTITIONER

## 2024-04-09 RX ORDER — EPINEPHRINE 0.3 MG/.3ML
0.3 INJECTION SUBCUTANEOUS ONCE
Qty: 0.6 ML | Refills: 0 | Status: SHIPPED | OUTPATIENT
Start: 2024-04-09 | End: 2024-04-09

## 2024-04-09 RX ORDER — CHOLESTYRAMINE 4 G/9G
POWDER, FOR SUSPENSION ORAL
COMMUNITY

## 2024-04-09 RX ORDER — ONDANSETRON 4 MG/1
4 TABLET, ORALLY DISINTEGRATING ORAL EVERY 8 HOURS PRN
COMMUNITY
Start: 2024-01-24

## 2024-04-09 NOTE — PROGRESS NOTES
Name: Sally Coleman      : 1980      MRN: [id number]  Encounter Provider: ERMA Simpson  Encounter Date: 2024   Encounter department: [facility]    Assessment & Plan Will repeat fasting labs with Vitamin b levels. Bp slightly up on exam will monitor and call if continued high readings. Will notify once labs are back. Will follow up in one year or sooner if need be.     1. Routine adult health maintenance  -     Comprehensive metabolic panel; Future  -     CBC and differential; Future  -     TSH, 3rd generation with Free T4 reflex; Future    2. Mixed hyperlipidemia  -     Comprehensive metabolic panel; Future  -     CBC and differential; Future  -     TSH, 3rd generation with Free T4 reflex; Future  -     Lipid panel; Future    3. Status post fecal microbiota transplant  -     Comprehensive metabolic panel; Future  -     CBC and differential; Future  -     TSH, 3rd generation with Free T4 reflex; Future    4. Vitamin B12 deficiency  -     Comprehensive metabolic panel; Future  -     CBC and differential; Future  -     TSH, 3rd generation with Free T4 reflex; Future  -     Vitamin B12; Future    5. Allergic reaction to fish  -     EPINEPHrine (EPIPEN) 0.3 mg/0.3 mL SOAJ; Inject 0.3 mL (0.3 mg total) into a muscle once for 1 dose    6. Palpitations    7. Screening for deficiency anemia  -     Iron Panel (Includes Ferritin, Iron Sat%, Iron, and TIBC); Future           Subjective      Sally is for a wellness. She is doing well but is having some palpitations and having some weight gain. She is not sure if this related to her vitamin b12 deficiency. She did have a cardiac workup done back in 2018 which was negative. She does get SOB on exertion and not sure why she is very active and does work out. She is up to date on her screenings and was doing PT for bursitis after having a flu vaccine. She would like to have her labs repeated. She is taking the b12 every 2  "weeks but by day 9 feels she needs it again. She offers no other issues.      Review of Systems   Constitutional:  Positive for fatigue.   Cardiovascular:  Positive for palpitations.   All other systems reviewed and are negative.      Current Outpatient Medications on File Prior to Visit   Medication Sig    cyanocobalamin 1,000 mcg/mL Inject 1 mL (1,000 mcg total) into a muscle once a week    cyclobenzaprine (FLEXERIL) 5 mg tablet Take 1 tablet (5 mg total) by mouth daily at bedtime    levonorgestrel-ethinyl estradiol (Iclevia) 0.15-0.03 MG per tablet Take 1 tablet by mouth daily    SUMAtriptan (Imitrex) 100 mg tablet Take 1 tablet (100 mg total) by mouth once as needed for migraine Up to 2 doses per migraine as needed    SYRINGE-NEEDLE, DISP, 3 ML (B-D SYRINGE/NEEDLE 3CC/25GX5/8) 25G X 5/8\" 3 ML MISC Use once a week    [DISCONTINUED] EPINEPHrine (EPIPEN) 0.3 mg/0.3 mL SOAJ Inject 0.3 mL (0.3 mg total) into a muscle once for 1 dose    cholestyramine (QUESTRAN) 4 g packet  (Patient not taking: Reported on 4/9/2024)    ondansetron (ZOFRAN-ODT) 4 mg disintegrating tablet Take 4 mg by mouth every 8 (eight) hours as needed for nausea or vomiting (Patient not taking: Reported on 4/9/2024)       Objective     /60   Pulse 82   Temp 98.3 °F (36.8 °C) (Temporal)   Ht 5' 8\" (1.727 m)   Wt 66.2 kg (145 lb 14.4 oz)   SpO2 98%   BMI 22.18 kg/m²     Physical Exam  Vitals reviewed.   Constitutional:       Appearance: Normal appearance. She is normal weight.   HENT:      Head: Normocephalic and atraumatic.      Right Ear: Tympanic membrane, ear canal and external ear normal.      Left Ear: Tympanic membrane, ear canal and external ear normal.      Nose: Nose normal.      Mouth/Throat:      Mouth: Mucous membranes are moist.      Pharynx: Oropharynx is clear.   Eyes:      Extraocular Movements: Extraocular movements intact.      Conjunctiva/sclera: Conjunctivae normal.      Pupils: Pupils are equal, round, and reactive to " light.   Cardiovascular:      Rate and Rhythm: Normal rate and regular rhythm.      Pulses: Normal pulses.      Heart sounds: Normal heart sounds.   Pulmonary:      Effort: Pulmonary effort is normal.      Breath sounds: Normal breath sounds.   Abdominal:      General: Abdomen is flat. Bowel sounds are normal.      Palpations: Abdomen is soft.   Musculoskeletal:         General: Normal range of motion.      Cervical back: Normal range of motion and neck supple.   Skin:     General: Skin is warm and dry.      Capillary Refill: Capillary refill takes less than 2 seconds.   Neurological:      General: No focal deficit present.      Mental Status: She is alert and oriented to person, place, and time. Mental status is at baseline.   Psychiatric:         Mood and Affect: Mood normal.         Behavior: Behavior normal.         Thought Content: Thought content normal.         Judgment: Judgment normal.       ERMA Simpson

## 2024-04-11 ENCOUNTER — LAB (OUTPATIENT)
Dept: LAB | Facility: CLINIC | Age: 44
End: 2024-04-11
Payer: COMMERCIAL

## 2024-04-11 DIAGNOSIS — E78.2 MIXED HYPERLIPIDEMIA: ICD-10-CM

## 2024-04-11 DIAGNOSIS — Z92.89: ICD-10-CM

## 2024-04-11 DIAGNOSIS — Z00.00 ROUTINE ADULT HEALTH MAINTENANCE: ICD-10-CM

## 2024-04-11 DIAGNOSIS — E53.8 VITAMIN B12 DEFICIENCY: ICD-10-CM

## 2024-04-11 DIAGNOSIS — Z13.0 SCREENING FOR DEFICIENCY ANEMIA: ICD-10-CM

## 2024-04-11 LAB
ALBUMIN SERPL BCP-MCNC: 4 G/DL (ref 3.5–5)
ALP SERPL-CCNC: 50 U/L (ref 34–104)
ALT SERPL W P-5'-P-CCNC: 8 U/L (ref 7–52)
ANION GAP SERPL CALCULATED.3IONS-SCNC: 8 MMOL/L (ref 4–13)
AST SERPL W P-5'-P-CCNC: 14 U/L (ref 13–39)
BASOPHILS # BLD AUTO: 0.05 THOUSANDS/ÂΜL (ref 0–0.1)
BASOPHILS NFR BLD AUTO: 1 % (ref 0–1)
BILIRUB SERPL-MCNC: 0.67 MG/DL (ref 0.2–1)
BUN SERPL-MCNC: 8 MG/DL (ref 5–25)
CALCIUM SERPL-MCNC: 8.6 MG/DL (ref 8.4–10.2)
CHLORIDE SERPL-SCNC: 104 MMOL/L (ref 96–108)
CHOLEST SERPL-MCNC: 215 MG/DL
CO2 SERPL-SCNC: 26 MMOL/L (ref 21–32)
CREAT SERPL-MCNC: 0.8 MG/DL (ref 0.6–1.3)
EOSINOPHIL # BLD AUTO: 0.08 THOUSAND/ÂΜL (ref 0–0.61)
EOSINOPHIL NFR BLD AUTO: 1 % (ref 0–6)
ERYTHROCYTE [DISTWIDTH] IN BLOOD BY AUTOMATED COUNT: 12.9 % (ref 11.6–15.1)
FERRITIN SERPL-MCNC: 35 NG/ML (ref 11–307)
GFR SERPL CREATININE-BSD FRML MDRD: 91 ML/MIN/1.73SQ M
GLUCOSE P FAST SERPL-MCNC: 88 MG/DL (ref 65–99)
HCT VFR BLD AUTO: 41.9 % (ref 34.8–46.1)
HDLC SERPL-MCNC: 41 MG/DL
HGB BLD-MCNC: 13.7 G/DL (ref 11.5–15.4)
IMM GRANULOCYTES # BLD AUTO: 0.05 THOUSAND/UL (ref 0–0.2)
IMM GRANULOCYTES NFR BLD AUTO: 1 % (ref 0–2)
IRON SATN MFR SERPL: 28 % (ref 15–50)
IRON SERPL-MCNC: 117 UG/DL (ref 50–212)
LDLC SERPL CALC-MCNC: 135 MG/DL (ref 0–100)
LYMPHOCYTES # BLD AUTO: 2.59 THOUSANDS/ÂΜL (ref 0.6–4.47)
LYMPHOCYTES NFR BLD AUTO: 25 % (ref 14–44)
MCH RBC QN AUTO: 30.2 PG (ref 26.8–34.3)
MCHC RBC AUTO-ENTMCNC: 32.7 G/DL (ref 31.4–37.4)
MCV RBC AUTO: 93 FL (ref 82–98)
MONOCYTES # BLD AUTO: 0.72 THOUSAND/ÂΜL (ref 0.17–1.22)
MONOCYTES NFR BLD AUTO: 7 % (ref 4–12)
NEUTROPHILS # BLD AUTO: 7.04 THOUSANDS/ÂΜL (ref 1.85–7.62)
NEUTS SEG NFR BLD AUTO: 65 % (ref 43–75)
NONHDLC SERPL-MCNC: 174 MG/DL
NRBC BLD AUTO-RTO: 0 /100 WBCS
PLATELET # BLD AUTO: 277 THOUSANDS/UL (ref 149–390)
PMV BLD AUTO: 10.3 FL (ref 8.9–12.7)
POTASSIUM SERPL-SCNC: 4.2 MMOL/L (ref 3.5–5.3)
PROT SERPL-MCNC: 6.4 G/DL (ref 6.4–8.4)
RBC # BLD AUTO: 4.53 MILLION/UL (ref 3.81–5.12)
SODIUM SERPL-SCNC: 138 MMOL/L (ref 135–147)
TIBC SERPL-MCNC: 423 UG/DL (ref 250–450)
TRIGL SERPL-MCNC: 193 MG/DL
TSH SERPL DL<=0.05 MIU/L-ACNC: 1.49 UIU/ML (ref 0.45–4.5)
UIBC SERPL-MCNC: 306 UG/DL (ref 155–355)
VIT B12 SERPL-MCNC: 482 PG/ML (ref 180–914)
WBC # BLD AUTO: 10.53 THOUSAND/UL (ref 4.31–10.16)

## 2024-04-11 PROCEDURE — 83550 IRON BINDING TEST: CPT

## 2024-04-11 PROCEDURE — 80061 LIPID PANEL: CPT

## 2024-04-11 PROCEDURE — 84443 ASSAY THYROID STIM HORMONE: CPT

## 2024-04-11 PROCEDURE — 36415 COLL VENOUS BLD VENIPUNCTURE: CPT

## 2024-04-11 PROCEDURE — 80053 COMPREHEN METABOLIC PANEL: CPT

## 2024-04-11 PROCEDURE — 85025 COMPLETE CBC W/AUTO DIFF WBC: CPT

## 2024-04-11 PROCEDURE — 83540 ASSAY OF IRON: CPT

## 2024-04-11 PROCEDURE — 82607 VITAMIN B-12: CPT

## 2024-04-11 PROCEDURE — 82728 ASSAY OF FERRITIN: CPT

## 2024-04-12 NOTE — RESULT ENCOUNTER NOTE
I called and spoke to patient.  She would like to go on medication for the cholesterol but would like to look into two different ones first.  Patient will contact you through the portal.  Also, she is concerned if she is going through menopause by the [facility] number going from 2.8 down to 1.8.  Once again, patient will contact you.

## 2024-04-19 DIAGNOSIS — E78.2 MIXED HYPERLIPIDEMIA: Primary | ICD-10-CM

## 2024-04-19 RX ORDER — EZETIMIBE 10 MG/1
10 TABLET ORAL DAILY
Qty: 30 TABLET | Refills: 5 | Status: SHIPPED | OUTPATIENT
Start: 2024-04-19 | End: 2024-10-16

## 2024-05-09 PROBLEM — Z00.00 ROUTINE ADULT HEALTH MAINTENANCE: Status: RESOLVED | Noted: 2024-04-09 | Resolved: 2024-05-09

## 2024-05-09 PROBLEM — Z13.0 SCREENING FOR DEFICIENCY ANEMIA: Status: RESOLVED | Noted: 2024-04-09 | Resolved: 2024-05-09

## 2024-06-19 DIAGNOSIS — N94.6 DYSMENORRHEA: ICD-10-CM

## 2024-06-19 RX ORDER — LEVONORGESTREL AND ETHINYL ESTRADIOL 0.15-0.03
1 KIT ORAL DAILY
Qty: 28 TABLET | Refills: 0 | Status: SHIPPED | OUTPATIENT
Start: 2024-06-19 | End: 2024-09-18

## 2024-07-05 DIAGNOSIS — E53.8 VITAMIN B12 DEFICIENCY: ICD-10-CM

## 2024-07-06 RX ORDER — NEEDLES, SAFETY 22GX1 1/2"
NEEDLE, DISPOSABLE MISCELLANEOUS WEEKLY
Qty: 12 EACH | Refills: 0 | Status: SHIPPED | OUTPATIENT
Start: 2024-07-06

## 2024-07-08 RX ORDER — CYANOCOBALAMIN 1000 UG/ML
1000 INJECTION, SOLUTION INTRAMUSCULAR; SUBCUTANEOUS WEEKLY
Qty: 12 ML | Refills: 0 | Status: SHIPPED | OUTPATIENT
Start: 2024-07-08

## 2024-09-06 DIAGNOSIS — Z00.6 ENCOUNTER FOR EXAMINATION FOR NORMAL COMPARISON OR CONTROL IN CLINICAL RESEARCH PROGRAM: ICD-10-CM

## 2024-09-13 DIAGNOSIS — E53.8 VITAMIN B12 DEFICIENCY: Primary | ICD-10-CM

## 2024-09-13 DIAGNOSIS — D72.829 LEUKOCYTOSIS, UNSPECIFIED TYPE: ICD-10-CM

## 2024-09-13 DIAGNOSIS — G43.909 MIGRAINE WITHOUT STATUS MIGRAINOSUS, NOT INTRACTABLE, UNSPECIFIED MIGRAINE TYPE: ICD-10-CM

## 2024-09-13 DIAGNOSIS — E78.2 MIXED HYPERLIPIDEMIA: ICD-10-CM

## 2024-09-14 DIAGNOSIS — N94.6 DYSMENORRHEA: ICD-10-CM

## 2024-09-16 ENCOUNTER — APPOINTMENT (OUTPATIENT)
Dept: LAB | Facility: CLINIC | Age: 44
End: 2024-09-16

## 2024-09-16 DIAGNOSIS — Z00.6 ENCOUNTER FOR EXAMINATION FOR NORMAL COMPARISON OR CONTROL IN CLINICAL RESEARCH PROGRAM: ICD-10-CM

## 2024-09-16 PROCEDURE — 36415 COLL VENOUS BLD VENIPUNCTURE: CPT

## 2024-09-16 RX ORDER — LEVONORGESTREL AND ETHINYL ESTRADIOL 0.15-0.03
1 KIT ORAL DAILY
Qty: 91 TABLET | Refills: 3 | Status: SHIPPED | OUTPATIENT
Start: 2024-09-16

## 2024-09-23 ENCOUNTER — APPOINTMENT (OUTPATIENT)
Dept: LAB | Facility: CLINIC | Age: 44
End: 2024-09-23
Payer: COMMERCIAL

## 2024-09-23 DIAGNOSIS — G43.909 MIGRAINE WITHOUT STATUS MIGRAINOSUS, NOT INTRACTABLE, UNSPECIFIED MIGRAINE TYPE: ICD-10-CM

## 2024-09-23 DIAGNOSIS — D72.829 LEUKOCYTOSIS, UNSPECIFIED TYPE: ICD-10-CM

## 2024-09-23 DIAGNOSIS — E78.2 MIXED HYPERLIPIDEMIA: ICD-10-CM

## 2024-09-23 DIAGNOSIS — E53.8 VITAMIN B12 DEFICIENCY: ICD-10-CM

## 2024-09-23 LAB
ALBUMIN SERPL BCG-MCNC: 4.5 G/DL (ref 3.5–5)
ALP SERPL-CCNC: 59 U/L (ref 34–104)
ALT SERPL W P-5'-P-CCNC: 10 U/L (ref 7–52)
ANION GAP SERPL CALCULATED.3IONS-SCNC: 8 MMOL/L (ref 4–13)
AST SERPL W P-5'-P-CCNC: 13 U/L (ref 13–39)
BASOPHILS # BLD AUTO: 0.04 THOUSANDS/ΜL (ref 0–0.1)
BASOPHILS NFR BLD AUTO: 0 % (ref 0–1)
BILIRUB SERPL-MCNC: 0.75 MG/DL (ref 0.2–1)
BUN SERPL-MCNC: 8 MG/DL (ref 5–25)
CALCIUM SERPL-MCNC: 9.1 MG/DL (ref 8.4–10.2)
CHLORIDE SERPL-SCNC: 103 MMOL/L (ref 96–108)
CHOLEST SERPL-MCNC: 242 MG/DL
CO2 SERPL-SCNC: 26 MMOL/L (ref 21–32)
CREAT SERPL-MCNC: 0.78 MG/DL (ref 0.6–1.3)
EOSINOPHIL # BLD AUTO: 0.02 THOUSAND/ΜL (ref 0–0.61)
EOSINOPHIL NFR BLD AUTO: 0 % (ref 0–6)
ERYTHROCYTE [DISTWIDTH] IN BLOOD BY AUTOMATED COUNT: 12.6 % (ref 11.6–15.1)
GFR SERPL CREATININE-BSD FRML MDRD: 94 ML/MIN/1.73SQ M
GLUCOSE P FAST SERPL-MCNC: 96 MG/DL (ref 65–99)
HCT VFR BLD AUTO: 44.9 % (ref 34.8–46.1)
HDLC SERPL-MCNC: 48 MG/DL
HGB BLD-MCNC: 14.6 G/DL (ref 11.5–15.4)
IMM GRANULOCYTES # BLD AUTO: 0.08 THOUSAND/UL (ref 0–0.2)
IMM GRANULOCYTES NFR BLD AUTO: 1 % (ref 0–2)
LDLC SERPL CALC-MCNC: 149 MG/DL (ref 0–100)
LYMPHOCYTES # BLD AUTO: 3.12 THOUSANDS/ΜL (ref 0.6–4.47)
LYMPHOCYTES NFR BLD AUTO: 21 % (ref 14–44)
MCH RBC QN AUTO: 30 PG (ref 26.8–34.3)
MCHC RBC AUTO-ENTMCNC: 32.5 G/DL (ref 31.4–37.4)
MCV RBC AUTO: 92 FL (ref 82–98)
MONOCYTES # BLD AUTO: 0.85 THOUSAND/ΜL (ref 0.17–1.22)
MONOCYTES NFR BLD AUTO: 6 % (ref 4–12)
NEUTROPHILS # BLD AUTO: 10.9 THOUSANDS/ΜL (ref 1.85–7.62)
NEUTS SEG NFR BLD AUTO: 72 % (ref 43–75)
NONHDLC SERPL-MCNC: 194 MG/DL
NRBC BLD AUTO-RTO: 0 /100 WBCS
PLATELET # BLD AUTO: 305 THOUSANDS/UL (ref 149–390)
PMV BLD AUTO: 10 FL (ref 8.9–12.7)
POTASSIUM SERPL-SCNC: 4.2 MMOL/L (ref 3.5–5.3)
PROT SERPL-MCNC: 6.9 G/DL (ref 6.4–8.4)
RBC # BLD AUTO: 4.86 MILLION/UL (ref 3.81–5.12)
SODIUM SERPL-SCNC: 137 MMOL/L (ref 135–147)
TRIGL SERPL-MCNC: 224 MG/DL
TSH SERPL DL<=0.05 MIU/L-ACNC: 2.01 UIU/ML (ref 0.45–4.5)
VIT B12 SERPL-MCNC: 572 PG/ML (ref 180–914)
WBC # BLD AUTO: 15.01 THOUSAND/UL (ref 4.31–10.16)

## 2024-09-23 PROCEDURE — 82607 VITAMIN B-12: CPT

## 2024-09-23 PROCEDURE — 85025 COMPLETE CBC W/AUTO DIFF WBC: CPT

## 2024-09-23 PROCEDURE — 80053 COMPREHEN METABOLIC PANEL: CPT

## 2024-09-23 PROCEDURE — 36415 COLL VENOUS BLD VENIPUNCTURE: CPT

## 2024-09-23 PROCEDURE — 84443 ASSAY THYROID STIM HORMONE: CPT

## 2024-09-23 PROCEDURE — 80061 LIPID PANEL: CPT

## 2024-09-28 LAB
APOB+LDLR+PCSK9 GENE MUT ANL BLD/T: NOT DETECTED
BRCA1+BRCA2 DEL+DUP + FULL MUT ANL BLD/T: NOT DETECTED
MLH1+MSH2+MSH6+PMS2 GN DEL+DUP+FUL M: NOT DETECTED

## 2024-10-14 DIAGNOSIS — E53.8 VITAMIN B12 DEFICIENCY: ICD-10-CM

## 2024-10-15 ENCOUNTER — OFFICE VISIT (OUTPATIENT)
Dept: INTERNAL MEDICINE CLINIC | Facility: CLINIC | Age: 44
End: 2024-10-15
Payer: COMMERCIAL

## 2024-10-15 VITALS
WEIGHT: 146.6 LBS | SYSTOLIC BLOOD PRESSURE: 123 MMHG | OXYGEN SATURATION: 100 % | HEART RATE: 79 BPM | DIASTOLIC BLOOD PRESSURE: 73 MMHG | BODY MASS INDEX: 22.22 KG/M2 | HEIGHT: 68 IN | TEMPERATURE: 98.1 F

## 2024-10-15 DIAGNOSIS — M25.50 ARTHRALGIA, UNSPECIFIED JOINT: ICD-10-CM

## 2024-10-15 DIAGNOSIS — K52.9 CHRONIC DIARRHEA: ICD-10-CM

## 2024-10-15 DIAGNOSIS — E78.2 MIXED HYPERLIPIDEMIA: Primary | ICD-10-CM

## 2024-10-15 PROBLEM — B37.0 ORAL THRUSH: Status: RESOLVED | Noted: 2021-08-26 | Resolved: 2024-10-15

## 2024-10-15 PROCEDURE — 99214 OFFICE O/P EST MOD 30 MIN: CPT | Performed by: NURSE PRACTITIONER

## 2024-10-15 RX ORDER — CHOLESTYRAMINE 4 G/9G
1 POWDER, FOR SUSPENSION ORAL 2 TIMES DAILY WITH MEALS
Qty: 60 EACH | Refills: 3 | Status: SHIPPED | OUTPATIENT
Start: 2024-10-15

## 2024-10-15 NOTE — PROGRESS NOTES
"Ambulatory Visit  Name: Sally Coleman      : 1980      MRN: [id number]  Encounter Provider: ERMA Simpson  Encounter Date: 10/15/2024   Encounter department: [facility]    Assessment & Plan  Mixed hyperlipidemia    Orders:    cholestyramine (QUESTRAN) 4 g packet; Take 1 packet (4 g total) by mouth 2 (two) times a day with meals    Lipid panel; Future    Chronic diarrhea    Orders:    cholestyramine (QUESTRAN) 4 g packet; Take 1 packet (4 g total) by mouth 2 (two) times a day with meals    Arthralgia, unspecified joint    Orders:    Lyme Total AB W Reflex to IGM/IGG; Future     Will repeat lipid panel. Would like to go back on questran. Other screenings and labs are up to date. Deferring flu vaccine. Will follow up in 6 months or sooner if need be.    History of Present Illness     Sally is for a follow up. She is doing well but is upset her cholesterol still being up even though she did try Zetia. She is eating healthy and very active. She would like to try other means before taking anything for cholesterol. She is doing vitamin B12 injections but now doing biweekly. She is up to date on her screenings but feels she maybe starting with perimenopause. She is having a harder time losing weight and is gaining around her midline. She is deferring the Flu vaccine today. She is willing to have repeat labs done in 3 months. Would also like a lyme titer due to being in the woods hunting. She has pulled a lot of ticks off of her and would like to just be safe.She offers no other issues.          Review of Systems   All other systems reviewed and are negative.          Objective     /73   Pulse 79   Temp 98.1 °F (36.7 °C)   Ht 5' 8\" (1.727 m)   Wt 66.5 kg (146 lb 9.6 oz)   SpO2 100%   BMI 22.29 kg/m²     Physical Exam  Vitals reviewed.   Constitutional:       Appearance: Normal appearance. She is normal weight.   HENT:      Head: Normocephalic and atraumatic. "      Right Ear: Tympanic membrane, ear canal and external ear normal.      Left Ear: Tympanic membrane, ear canal and external ear normal.      Nose: Nose normal.      Mouth/Throat:      Mouth: Mucous membranes are moist.      Pharynx: Oropharynx is clear.   Eyes:      Extraocular Movements: Extraocular movements intact.      Conjunctiva/sclera: Conjunctivae normal.      Pupils: Pupils are equal, round, and reactive to light.   Cardiovascular:      Rate and Rhythm: Normal rate and regular rhythm.      Pulses: Normal pulses.      Heart sounds: Normal heart sounds.   Pulmonary:      Effort: Pulmonary effort is normal.      Breath sounds: Normal breath sounds.   Abdominal:      General: Abdomen is flat. Bowel sounds are normal.      Palpations: Abdomen is soft.   Musculoskeletal:         General: Normal range of motion.      Cervical back: Normal range of motion and neck supple.   Skin:     General: Skin is warm and dry.      Capillary Refill: Capillary refill takes less than 2 seconds.   Neurological:      General: No focal deficit present.      Mental Status: She is alert and oriented to person, place, and time. Mental status is at baseline.   Psychiatric:         Mood and Affect: Mood normal.         Behavior: Behavior normal.         Thought Content: Thought content normal.         Judgment: Judgment normal.

## 2024-10-15 NOTE — ASSESSMENT & PLAN NOTE
Orders:    cholestyramine (QUESTRAN) 4 g packet; Take 1 packet (4 g total) by mouth 2 (two) times a day with meals    Lipid panel; Future

## 2024-10-16 RX ORDER — CYANOCOBALAMIN 1000 UG/ML
INJECTION, SOLUTION INTRAMUSCULAR; SUBCUTANEOUS
Qty: 12 ML | Refills: 0 | Status: SHIPPED | OUTPATIENT
Start: 2024-10-16

## 2025-01-13 DIAGNOSIS — E53.8 VITAMIN B12 DEFICIENCY: ICD-10-CM

## 2025-01-14 RX ORDER — NEEDLES, SAFETY 22GX1 1/2"
NEEDLE, DISPOSABLE MISCELLANEOUS WEEKLY
Qty: 12 EACH | Refills: 0 | Status: SHIPPED | OUTPATIENT
Start: 2025-01-14

## 2025-01-15 RX ORDER — CYANOCOBALAMIN 1000 UG/ML
INJECTION, SOLUTION INTRAMUSCULAR; SUBCUTANEOUS
Qty: 12 ML | Refills: 0 | Status: SHIPPED | OUTPATIENT
Start: 2025-01-15

## 2025-02-11 ENCOUNTER — TELEPHONE (OUTPATIENT)
Age: 45
End: 2025-02-11

## 2025-02-11 NOTE — TELEPHONE ENCOUNTER
Pt called c/o chronic yeast infections mouth, skin and tongue. Also, would like to discuss her recent appointment with ENT.      No appointments available with PCP and patient scheduled for Thursday, 2/13/25 with [facility].    Pt will also bring along photo ID and insurance cards to appointment

## 2025-02-13 ENCOUNTER — OFFICE VISIT (OUTPATIENT)
Dept: INTERNAL MEDICINE CLINIC | Facility: CLINIC | Age: 45
End: 2025-02-13
Payer: COMMERCIAL

## 2025-02-13 VITALS
WEIGHT: 146.3 LBS | BODY MASS INDEX: 22.17 KG/M2 | OXYGEN SATURATION: 98 % | HEART RATE: 100 BPM | HEIGHT: 68 IN | DIASTOLIC BLOOD PRESSURE: 82 MMHG | SYSTOLIC BLOOD PRESSURE: 128 MMHG | TEMPERATURE: 98.1 F

## 2025-02-13 DIAGNOSIS — R21 RASH AND NONSPECIFIC SKIN ERUPTION: Primary | ICD-10-CM

## 2025-02-13 DIAGNOSIS — E53.8 VITAMIN B12 DEFICIENCY: ICD-10-CM

## 2025-02-13 DIAGNOSIS — R25.2 MUSCLE CRAMPS: ICD-10-CM

## 2025-02-13 PROCEDURE — 99214 OFFICE O/P EST MOD 30 MIN: CPT | Performed by: NURSE PRACTITIONER

## 2025-02-13 RX ORDER — NEEDLES, SAFETY 22GX1 1/2"
NEEDLE, DISPOSABLE MISCELLANEOUS WEEKLY
Qty: 12 EACH | Refills: 0 | Status: SHIPPED | OUTPATIENT
Start: 2025-02-13

## 2025-02-13 RX ORDER — FLUCONAZOLE 150 MG/1
TABLET ORAL
Qty: 3 TABLET | Refills: 0 | Status: SHIPPED | OUTPATIENT
Start: 2025-02-13 | End: 2025-02-16

## 2025-02-13 NOTE — PROGRESS NOTES
"Name: Sally Coleman      : 1980      MRN: [id number]  Encounter Provider: ERMA Simpson  Encounter Date: 2025   Encounter department: [facility]  :  Assessment & Plan  Vitamin B12 deficiency    Orders:    SYRINGE-NEEDLE, DISP, 3 ML (B-D SYRINGE/NEEDLE 3CC/25GX5/8) 25G X 5/8\" 3 ML MISC; Use once a week    CBC and differential; Future    Rash and nonspecific skin eruption    Orders:    fluconazole (DIFLUCAN) 150 mg tablet; Take one tablet by mouth daily for 3 days    CBC and differential; Future    Muscle cramps    Orders:    Magnesium; Future    CBC and differential; Future    Sedimentation rate, automated; Future    C-reactive protein; Future    BERNARDINO Screen w/Reflex Cascade; Future    Will start on diflucan for 3 days. Will repeat labs with MG and inflammatory markers. Will notify once labs are back.       History of Present Illness   Siria is for an acute visit. She is having recurrent yeast infections primarily in her mouth and feels on her neck. 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 She states she has seen ENT and ID but did not get anywhere. She continues with sores in her mouth and can feel when this is coming on. She also is having joint pain and feels her muscles are always cramping. She is very active and does have a hard time riding a bike due to this. She offers no other issues.      Review of Systems   Musculoskeletal:  Positive for arthralgias and myalgias.   All other systems reviewed and are negative.      Objective   /82 (BP Location: Left arm, Patient Position: Sitting)   Pulse 100   Temp 98.1 °F (36.7 °C) (Temporal)   Ht 5' 8\" (1.727 m)   Wt 66.4 kg (146 lb 4.8 oz)   SpO2 98%   BMI 22.24 kg/m²      Physical Exam  Vitals reviewed.   Constitutional:       Appearance: Normal appearance. She is normal weight.   HENT:      Head: Normocephalic and atraumatic.      Right Ear: Tympanic membrane, ear " canal and external ear normal.      Left Ear: Tympanic membrane, ear canal and external ear normal.      Nose: Nose normal.      Mouth/Throat:      Mouth: Mucous membranes are moist.      Pharynx: Oropharynx is clear.   Cardiovascular:      Rate and Rhythm: Normal rate and regular rhythm.      Pulses: Normal pulses.      Heart sounds: Normal heart sounds.   Pulmonary:      Effort: Pulmonary effort is normal.      Breath sounds: Normal breath sounds.   Abdominal:      General: Abdomen is flat. Bowel sounds are normal.      Palpations: Abdomen is soft.   Musculoskeletal:         General: Normal range of motion.      Cervical back: Normal range of motion and neck supple.   Skin:     General: Skin is warm and dry.      Capillary Refill: Capillary refill takes less than 2 seconds.   Neurological:      General: No focal deficit present.      Mental Status: She is alert and oriented to person, place, and time. Mental status is at baseline.   Psychiatric:         Mood and Affect: Mood normal.         Behavior: Behavior normal.         Thought Content: Thought content normal.         Judgment: Judgment normal.

## 2025-02-13 NOTE — ASSESSMENT & PLAN NOTE
"  Orders:    SYRINGE-NEEDLE, DISP, 3 ML (B-D SYRINGE/NEEDLE 3CC/25GX5/8) 25G X 5/8\" 3 ML MISC; Use once a week    CBC and differential; Future    "

## 2025-02-13 NOTE — ASSESSMENT & PLAN NOTE
Orders:    Magnesium; Future    CBC and differential; Future    Sedimentation rate, automated; Future    C-reactive protein; Future    BERNARDINO Screen w/Reflex Cascade; Future

## 2025-02-13 NOTE — ASSESSMENT & PLAN NOTE
Orders:    fluconazole (DIFLUCAN) 150 mg tablet; Take one tablet by mouth daily for 3 days    CBC and differential; Future

## 2025-02-17 ENCOUNTER — APPOINTMENT (OUTPATIENT)
Dept: LAB | Facility: CLINIC | Age: 45
End: 2025-02-17
Payer: COMMERCIAL

## 2025-02-17 DIAGNOSIS — M25.50 ARTHRALGIA, UNSPECIFIED JOINT: ICD-10-CM

## 2025-02-17 DIAGNOSIS — E53.8 VITAMIN B12 DEFICIENCY: ICD-10-CM

## 2025-02-17 DIAGNOSIS — R25.2 MUSCLE CRAMPS: ICD-10-CM

## 2025-02-17 DIAGNOSIS — E78.2 MIXED HYPERLIPIDEMIA: ICD-10-CM

## 2025-02-17 DIAGNOSIS — R21 RASH AND NONSPECIFIC SKIN ERUPTION: ICD-10-CM

## 2025-02-17 LAB
BASOPHILS # BLD AUTO: 0.05 THOUSANDS/ΜL (ref 0–0.1)
BASOPHILS NFR BLD AUTO: 1 % (ref 0–1)
CHOLEST SERPL-MCNC: 282 MG/DL (ref ?–200)
CRP SERPL QL: 5.1 MG/L
EOSINOPHIL # BLD AUTO: 0.06 THOUSAND/ΜL (ref 0–0.61)
EOSINOPHIL NFR BLD AUTO: 1 % (ref 0–6)
ERYTHROCYTE [DISTWIDTH] IN BLOOD BY AUTOMATED COUNT: 13.5 % (ref 11.6–15.1)
ERYTHROCYTE [SEDIMENTATION RATE] IN BLOOD: 13 MM/HOUR (ref 0–19)
HCT VFR BLD AUTO: 45.6 % (ref 34.8–46.1)
HDLC SERPL-MCNC: 55 MG/DL
HGB BLD-MCNC: 15 G/DL (ref 11.5–15.4)
IMM GRANULOCYTES # BLD AUTO: 0.05 THOUSAND/UL (ref 0–0.2)
IMM GRANULOCYTES NFR BLD AUTO: 1 % (ref 0–2)
LDLC SERPL CALC-MCNC: 178 MG/DL (ref 0–100)
LYMPHOCYTES # BLD AUTO: 3.12 THOUSANDS/ΜL (ref 0.6–4.47)
LYMPHOCYTES NFR BLD AUTO: 29 % (ref 14–44)
MAGNESIUM SERPL-MCNC: 2 MG/DL (ref 1.9–2.7)
MCH RBC QN AUTO: 30.9 PG (ref 26.8–34.3)
MCHC RBC AUTO-ENTMCNC: 32.9 G/DL (ref 31.4–37.4)
MCV RBC AUTO: 94 FL (ref 82–98)
MONOCYTES # BLD AUTO: 0.89 THOUSAND/ΜL (ref 0.17–1.22)
MONOCYTES NFR BLD AUTO: 8 % (ref 4–12)
NEUTROPHILS # BLD AUTO: 6.67 THOUSANDS/ΜL (ref 1.85–7.62)
NEUTS SEG NFR BLD AUTO: 60 % (ref 43–75)
NONHDLC SERPL-MCNC: 227 MG/DL
NRBC BLD AUTO-RTO: 0 /100 WBCS
PLATELET # BLD AUTO: 354 THOUSANDS/UL (ref 149–390)
PMV BLD AUTO: 9.7 FL (ref 8.9–12.7)
RBC # BLD AUTO: 4.86 MILLION/UL (ref 3.81–5.12)
TRIGL SERPL-MCNC: 245 MG/DL (ref ?–150)
WBC # BLD AUTO: 10.84 THOUSAND/UL (ref 4.31–10.16)

## 2025-02-17 PROCEDURE — 85025 COMPLETE CBC W/AUTO DIFF WBC: CPT

## 2025-02-17 PROCEDURE — 36415 COLL VENOUS BLD VENIPUNCTURE: CPT

## 2025-02-17 PROCEDURE — 80061 LIPID PANEL: CPT

## 2025-02-17 PROCEDURE — 86038 ANTINUCLEAR ANTIBODIES: CPT

## 2025-02-17 PROCEDURE — 85652 RBC SED RATE AUTOMATED: CPT

## 2025-02-17 PROCEDURE — 86225 DNA ANTIBODY NATIVE: CPT

## 2025-02-17 PROCEDURE — 83735 ASSAY OF MAGNESIUM: CPT

## 2025-02-17 PROCEDURE — 86618 LYME DISEASE ANTIBODY: CPT

## 2025-02-17 PROCEDURE — 86140 C-REACTIVE PROTEIN: CPT

## 2025-02-18 LAB
B BURGDOR IGG+IGM SER QL IA: NEGATIVE
DSDNA IGG SERPL IA-ACNC: 2.8 IU/ML (ref ?–15)
NUCLEAR IGG SER IA-RTO: 0.1 RATIO (ref ?–1)

## 2025-02-25 ENCOUNTER — RESULTS FOLLOW-UP (OUTPATIENT)
Dept: INTERNAL MEDICINE CLINIC | Facility: CLINIC | Age: 45
End: 2025-02-25

## 2025-04-06 NOTE — ASSESSMENT & PLAN NOTE
Orders:    cholestyramine (QUESTRAN) 4 g packet; Take 1 packet (4 g total) by mouth 2 (two) times a day with meals     Resident to bedside for cervical check    2330 (on admit) 4/80/-3  0045: 4/80/-3  0200: 4/80/-3    Stable over 3 SVEs.     Temp:  [98.5 °F (36.9 °C)-98.6 °F (37 °C)] 98.5 °F (36.9 °C)  Pulse:  [] 100  Resp:  [16] 16  SpO2:  [96 %-100 %] 100 %  BP: (112-129)/(69-84) 117/75      NST reactive and reassuring x2  TOCO q 2-3 minutes    Continue Mag, PCN, Indocin   S/p BMZ 1/2 (7891)  Continuous monitoring  NPO  Will recheck in 2 hours or PRN    Michaelle Borwn MD, MPH  OBGYN PGY-4

## 2025-04-08 ENCOUNTER — TELEPHONE (OUTPATIENT)
Age: 45
End: 2025-04-08

## 2025-04-08 NOTE — TELEPHONE ENCOUNTER
Patient has a fungal infection on neck and her tongue is swelling. She has been brushing her tongue to try and help this but not getting better it has been since Thursday.    You had ask if she would be willing to take a statin and she is.    Please give patient a call back asap she did send a [employer] message on 4/2/2025.    Her # [phone]

## 2025-04-09 DIAGNOSIS — E78.2 MIXED HYPERLIPIDEMIA: ICD-10-CM

## 2025-04-09 DIAGNOSIS — B37.9 YEAST INFECTION: Primary | ICD-10-CM

## 2025-04-09 RX ORDER — FLUCONAZOLE 150 MG/1
150 TABLET ORAL ONCE
Qty: 1 TABLET | Refills: 0 | Status: SHIPPED | OUTPATIENT
Start: 2025-04-09 | End: 2025-04-09

## 2025-04-09 RX ORDER — ATORVASTATIN CALCIUM 20 MG/1
20 TABLET, FILM COATED ORAL DAILY
Qty: 30 TABLET | Refills: 5 | Status: SHIPPED | OUTPATIENT
Start: 2025-04-09

## 2025-06-16 ENCOUNTER — TELEPHONE (OUTPATIENT)
Age: 45
End: 2025-06-16

## 2025-06-16 NOTE — TELEPHONE ENCOUNTER
Patient called in to have a sooner appointment as a new patient. Call transferred to the office to future assist.

## 2025-06-23 ENCOUNTER — OFFICE VISIT (OUTPATIENT)
Dept: FAMILY MEDICINE CLINIC | Facility: CLINIC | Age: 45
End: 2025-06-23
Payer: COMMERCIAL

## 2025-06-23 VITALS
DIASTOLIC BLOOD PRESSURE: 78 MMHG | OXYGEN SATURATION: 98 % | SYSTOLIC BLOOD PRESSURE: 114 MMHG | HEART RATE: 81 BPM | HEIGHT: 68 IN | BODY MASS INDEX: 22.13 KG/M2 | WEIGHT: 146 LBS | TEMPERATURE: 97.7 F

## 2025-06-23 DIAGNOSIS — Z00.00 ANNUAL PHYSICAL EXAM: Primary | ICD-10-CM

## 2025-06-23 DIAGNOSIS — N92.6 ABNORMAL MENSTRUAL PERIODS: ICD-10-CM

## 2025-06-23 DIAGNOSIS — M72.2 PLANTAR FASCIITIS, BILATERAL: ICD-10-CM

## 2025-06-23 DIAGNOSIS — Z23 ENCOUNTER FOR IMMUNIZATION: ICD-10-CM

## 2025-06-23 DIAGNOSIS — L65.9 HAIR LOSS: ICD-10-CM

## 2025-06-23 DIAGNOSIS — Z00.00 ROUTINE HEALTH MAINTENANCE: ICD-10-CM

## 2025-06-23 DIAGNOSIS — Z12.31 ENCOUNTER FOR SCREENING MAMMOGRAM FOR BREAST CANCER: ICD-10-CM

## 2025-06-23 DIAGNOSIS — E53.8 VITAMIN B12 DEFICIENCY: ICD-10-CM

## 2025-06-23 DIAGNOSIS — E78.5 DYSLIPIDEMIA: ICD-10-CM

## 2025-06-23 PROBLEM — K52.9 CHRONIC DIARRHEA: Status: RESOLVED | Noted: 2017-08-28 | Resolved: 2025-06-23

## 2025-06-23 PROBLEM — Z86.69 HISTORY OF MIGRAINE: Status: RESOLVED | Noted: 2018-09-27 | Resolved: 2025-06-23

## 2025-06-23 PROBLEM — K31.89: Status: RESOLVED | Noted: 2019-02-01 | Resolved: 2025-06-23

## 2025-06-23 PROBLEM — R21 RASH AND NONSPECIFIC SKIN ERUPTION: Status: RESOLVED | Noted: 2025-02-13 | Resolved: 2025-06-23

## 2025-06-23 PROCEDURE — 90715 TDAP VACCINE 7 YRS/> IM: CPT

## 2025-06-23 PROCEDURE — 99396 PREV VISIT EST AGE 40-64: CPT

## 2025-06-23 PROCEDURE — 90471 IMMUNIZATION ADMIN: CPT

## 2025-06-23 PROCEDURE — 99204 OFFICE O/P NEW MOD 45 MIN: CPT

## 2025-06-23 RX ORDER — CYANOCOBALAMIN 1000 UG/ML
1000 INJECTION, SOLUTION INTRAMUSCULAR; SUBCUTANEOUS WEEKLY
Qty: 12 ML | Refills: 0 | Status: SHIPPED | OUTPATIENT
Start: 2025-06-23

## 2025-06-23 RX ORDER — NEEDLES, SAFETY 22GX1 1/2"
NEEDLE, DISPOSABLE MISCELLANEOUS WEEKLY
Qty: 12 EACH | Refills: 0 | Status: SHIPPED | OUTPATIENT
Start: 2025-06-23

## 2025-06-23 NOTE — PROGRESS NOTES
Adult Annual Physical  Name: Sally Coleman      : 1980      MRN: [id number]  Encounter Provider: Tray Vyas PA-C  Encounter Date: 2025   Encounter department: [facility]    :  Assessment & Plan  Annual physical exam         Encounter for screening mammogram for breast cancer    Orders:    Mammo screening bilateral w 3d and cad; Future    Hair loss  x2 weeks, pt notes more severe than her baseline   Of note pt did stop OCPs 3 months ago and has not gotten a period   Otherwise ROS unremarkable   PE reveals no patches of hair loss but pt does demonstrate ease of hair loss wwith light traction to her hair   Will proceed with lab evaluation as below  Orders:    DHEA-sulfate; Future    Iron Panel (Includes Ferritin, Iron Sat%, Iron, and TIBC); Future    CBC and differential; Future    TSH, 3rd generation with Free T4 reflex; Future    Plantar fasciitis, bilateral  Pain located b/l feet for 9 year(s)  Described as aching and waxes and wanes, and is worsened by prolonged standing.   Pt has attempted to alleviate the pain with PT and otc rx which has caused some relief.   does not have a Hx of trauma or injury related to current sx.  Was given meloxicam Rx but has not taken it yet due to concern of insomnia   Advised taking meloxicam PRN, pt denies PT eval   F/u prn       Abnormal menstrual periods  Has not had period in 3 months  Stopped OCPs 3 months ago as well  States she has struggled with hormonal issues in the past, status post IVF attempts without success  Patient would like hormonal testing to further evaluate her current hormonal status after stopping OCPs and having abnormal menstruation  Did inform her we will order baseline testing, and she is scheduled with OB/GYN and I advised her if she has further questions after my interpretation of with OB/GYN provider  She voiced understanding  Orders:    Luteinizing hormone; Future    Follicle stimulating hormone; Future     "Estradiol; Future    Testosterone; Future    Vitamin B12 deficiency  Has been doing at home B12 injections 1000 mcg once weekly  Will recheck to ensure stable with current dosing  Orders:    SYRINGE-NEEDLE, DISP, 3 ML (B-D SYRINGE/NEEDLE 3CC/25GX5/8) 25G X 5/8\" 3 ML MISC; Use once a week    cyanocobalamin 1,000 mcg/mL; Inject 1 mL (1,000 mcg total) into a muscle once a week    Routine health maintenance    Orders:    CBC and differential; Future    Dyslipidemia  Marked dyslipidemia seen on most recent lipid panel 2/25   Patient has atorvastatin 20 mg prescribed in her chart, she states she has not been taking this due to concern for adverse side effects  I reviewed the risk and benefits of statin medication versus her very elevated cholesterol, after discussing risk and benefits she is willing to adhere to daily dosing and we will recheck lipid levels in 3 months, labs ordered  Orders:    Lipid panel; Future        Preventive Screenings:    - Cervical cancer screening: screening up-to-date          History of Present Illness     Adult Annual Physical:  Patient presents for annual physical. Sally Coleman is a 44 y.o. female  presenting for concerns of hair loss, to establish care, and is also due for annual    vaccines, care gaps, screenings, routine labs, relevant lab work and imaging, reviewed at this visit. Due for tdap.    **Note: Portions of the record may have been created with voice recognition software.  Occasional wrong word or \"sound alike\" substitutions may have occurred due to the inherent limitations of voice recognition software.  Please read the chart carefully and recognize, using context, where substitutions have occurred. Please contact for further clarification, when necessary. .     Diet and Physical Activity:  - Diet/Nutrition: no special diet.  - Exercise: strength training exercises and 3-4 times a week on average.    Depression Screening:  - PHQ-2 Score: 2    General Health:  - Sleep: 4-6 " hours of sleep on average and sleeps poorly.  - Hearing: normal hearing bilateral ears.  - Vision: wears contacts and most recent eye exam < 1 year ago.  - Dental: regular dental visits, brushes teeth twice daily and floss regularly.    /GYN Health:  - Follows with GYN: yes.   - Menopause: premenopausal.   - Contraception:. None      Review of Systems   Constitutional:  Negative for chills and fever.   HENT:  Negative for ear pain and sore throat.         Hair loss     Eyes:  Negative for pain and visual disturbance.   Respiratory:  Negative for cough and shortness of breath.    Cardiovascular:  Negative for chest pain and palpitations.   Gastrointestinal:  Negative for abdominal pain and vomiting.   Genitourinary:  Negative for dysuria and hematuria.   Musculoskeletal:  Positive for myalgias (Chronic plantars fasciitis). Negative for arthralgias and back pain.   Skin:  Negative for color change and rash.   Neurological:  Negative for seizures and syncope.   All other systems reviewed and are negative.    Medical History Reviewed by provider this encounter:  Tobacco  Allergies  Meds  Problems  Med Hx  Surg Hx  Fam Hx     .  Past Medical History   Past Medical History[1]  Past Surgical History[2]  Family History[3]   reports that she has never smoked. She has never used smokeless tobacco. She reports that she does not currently use alcohol after a past usage of about 1.0 standard drink of alcohol per week. She reports that she does not use drugs.  Current Outpatient Medications   Medication Instructions    atorvastatin (LIPITOR) 20 mg, Oral, Daily    cyanocobalamin 1,000 mcg, Intramuscular, Weekly    cyclobenzaprine (FLEXERIL) 5 mg, Oral, Daily at bedtime    EPINEPHrine (EPIPEN) 0.3 mg, Intramuscular, Once    Setlakin 0.15-0.03 MG per tablet 1 tablet, Oral, Daily    SUMAtriptan (IMITREX) 100 mg, Oral, Once as needed, Up to 2 doses per migraine as needed    SYRINGE-NEEDLE, DISP, 3 ML (B-D SYRINGE/NEEDLE  "3CC/25GX5/8) 25G X 5/8\" 3 ML MISC Does not apply, Weekly   Allergies[4]   Medications Ordered Prior to Encounter[5]   Social History[6]    Objective   /78   Pulse 81   Temp 97.7 °F (36.5 °C)   Ht 5' 8\" (1.727 m)   Wt 66.2 kg (146 lb)   SpO2 98%   BMI 22.20 kg/m²     Physical Exam  Vitals and nursing note reviewed.   Constitutional:       General: She is not in acute distress.     Appearance: She is well-developed.   HENT:      Head: Normocephalic and atraumatic.      Comments: Hair loss demonstrated      Eyes:      Conjunctiva/sclera: Conjunctivae normal.       Cardiovascular:      Rate and Rhythm: Normal rate and regular rhythm.      Heart sounds: No murmur heard.  Pulmonary:      Effort: Pulmonary effort is normal. No respiratory distress.      Breath sounds: Normal breath sounds.   Abdominal:      Palpations: Abdomen is soft.      Tenderness: There is no abdominal tenderness.     Musculoskeletal:         General: No swelling.      Cervical back: Neck supple.     Skin:     General: Skin is warm and dry.      Capillary Refill: Capillary refill takes less than 2 seconds.     Neurological:      Mental Status: She is alert and oriented to person, place, and time.     Psychiatric:         Mood and Affect: Mood normal.                [1]   Past Medical History:  Diagnosis Date    Abnormal Pap smear of cervix     Female infertility     Migraine     Ovarian cyst     Palpitations     Renal disorder     acute kidney injury    Tachycardia     Uterine fibroid     Uterine leiomyoma     Last assessed 8/28/2017   [2]   Past Surgical History:  Procedure Laterality Date    APPENDECTOMY      CERVICAL BIOPSY  W/ LOOP ELECTRODE EXCISION      Last assessed 8/28/2017    COLONOSCOPY      COLONOSCOPY WITH FECAL TRANSPLANT      COLPOSCOPY      DENTAL SURGERY      DILATION AND CURETTAGE OF UTERUS      Last assessed 8/28/2017    ESOPHAGOGASTRODUODENOSCOPY      OVARIAN CYST REMOVAL      Last assessed 8/28/2017    TONSILLECTOMY   "   [3]   Family History  Problem Relation Name Age of Onset    Other Mother Umu         Cardiac Disorder    Heart attack Mother Umu     Arrhythmia Mother Umu     Coronary artery disease Mother Umu     Hyperlipidemia Mother Umu     Diabetes Mother Umu     Hypertension Father Abdulkadir     Clotting disorder Father Abdulkadir     Abdominal aortic aneurysm Father Abdulkadir     Deep vein thrombosis Father Abdulkadir     No Known Problems Maternal Grandmother      No Known Problems Maternal Grandfather      Heart failure Paternal Grandmother Sinai     No Known Problems Paternal Grandfather      No Known Problems Half-Sister      No Known Problems Half-Sister      No Known Problems Maternal Aunt      No Known Problems Paternal Aunt      No Known Problems Paternal Aunt      Stroke Neg Hx      Cancer Neg Hx      Colon cancer Neg Hx      Breast cancer Neg Hx      Ovarian cancer Neg Hx      Breast cancer additional onset Neg Hx      Endometrial cancer Neg Hx      BRCA1 Negative Neg Hx      BRCA2 Negative Neg Hx      BRCA2 Positive Neg Hx      BRCA1 Positive Neg Hx      BRCA 1/2 Neg Hx      Aortic aneurysm Neg Hx     [4]   Allergies  Allergen Reactions    Hydrocodone Anaphylaxis    Morphine Palpitations    Morphine And Codeine Anaphylaxis and Palpitations    Oxycodone Anaphylaxis    Penicillins Fever and Other (See Comments)    Clomid [Clomiphene]     Sulfa Antibiotics Fever   [5]   Current Outpatient Medications on File Prior to Visit   Medication Sig Dispense Refill    atorvastatin (LIPITOR) 20 mg tablet Take 1 tablet (20 mg total) by mouth daily 30 tablet 5    cyclobenzaprine (FLEXERIL) 5 mg tablet Take 1 tablet (5 mg total) by mouth daily at bedtime (Patient not taking: Reported on 10/15/2024) 30 tablet 0    EPINEPHrine (EPIPEN) 0.3 mg/0.3 mL SOAJ Inject 0.3 mL (0.3 mg total) into a muscle once for 1 dose 0.6 mL 0    Setlakin 0.15-0.03 MG per tablet take 1 tablet by mouth once daily 91 tablet 3    SUMAtriptan (Imitrex) 100 mg tablet  "Take 1 tablet (100 mg total) by mouth once as needed for migraine Up to 2 doses per migraine as needed 9 tablet 3    [DISCONTINUED] cholestyramine (QUESTRAN) 4 g packet Take 1 packet (4 g total) by mouth 2 (two) times a day with meals 60 each 3    [DISCONTINUED] cyanocobalamin 1,000 mcg/mL inject 1 MILLILITER intramuscularly every week 12 mL 0    [DISCONTINUED] SYRINGE-NEEDLE, DISP, 3 ML (B-D SYRINGE/NEEDLE 3CC/25GX5/8) 25G X 5/8\" 3 ML MISC Use once a week 12 each 0     No current facility-administered medications on file prior to visit.   [6]   Social History  Tobacco Use    Smoking status: Never    Smokeless tobacco: Never   Vaping Use    Vaping status: Never Used   Substance and Sexual Activity    Alcohol use: Not Currently     Alcohol/week: 1.0 standard drink of alcohol     Types: 1 Glasses of wine per week     Comment: socially    Drug use: No    Sexual activity: Yes     Partners: Male     Birth control/protection: OCP     "

## 2025-06-23 NOTE — ASSESSMENT & PLAN NOTE
"Has been doing at home B12 injections 1000 mcg once weekly  Will recheck to ensure stable with current dosing  Orders:    SYRINGE-NEEDLE, DISP, 3 ML (B-D SYRINGE/NEEDLE 3CC/25GX5/8) 25G X 5/8\" 3 ML MISC; Use once a week    cyanocobalamin 1,000 mcg/mL; Inject 1 mL (1,000 mcg total) into a muscle once a week    "

## 2025-06-23 NOTE — PATIENT INSTRUCTIONS
"Patient Education     Routine physical for adults   The Basics   Written by the doctors and editors at [facility]   What is a physical? -- A physical is a routine visit, or \"check-up,\" with your doctor. You might also hear it called a \"wellness visit\" or \"preventive visit.\"  During each visit, the doctor will:   Ask about your physical and mental health   Ask about your habits, behaviors, and lifestyle   Do an exam   Give you vaccines if needed   Talk to you about any medicines you take   Give advice about your health   Answer your questions  Getting regular check-ups is an important part of taking care of your health. It can help your doctor find and treat any problems you have. But it's also important for preventing health problems.  A routine physical is different from a \"sick visit.\" A sick visit is when you see a doctor because of a health concern or problem. Since physicals are scheduled ahead of time, you can think about what you want to ask the doctor.  How often should I get a physical? -- It depends on your age and health. In general, for people age 21 years and older:   If you are younger than 50 years, you might be able to get a physical every 3 years.   If you are 50 years or older, your doctor might recommend a physical every year.  If you have an ongoing health condition, like diabetes or high blood pressure, your doctor will probably want to see you more often.  What happens during a physical? -- In general, each visit will include:   Physical exam - The doctor or nurse will check your height, weight, heart rate, and blood pressure. They will also look at your eyes and ears. They will ask about how you are feeling and whether you have any symptoms that bother you.   Medicines - It's a good idea to bring a list of all the medicines you take to each doctor visit. Your doctor will talk to you about your medicines and answer any questions. Tell them if you are having any side effects that bother you. You " "should also tell them if you are having trouble paying for any of your medicines.   Habits and behaviors - This includes:   Your diet   Your exercise habits   Whether you smoke, drink alcohol, or use drugs   Whether you are sexually active   Whether you feel safe at home  Your doctor will talk to you about things you can do to improve your health and lower your risk of health problems. They will also offer help and support. For example, if you want to quit smoking, they can give you advice and might prescribe medicines. If you want to improve your diet or get more physical activity, they can help you with this, too.   Lab tests, if needed - The tests you get will depend on your age and situation. For example, your doctor might want to check your:   Cholesterol   Blood sugar   Iron level   Vaccines - The recommended vaccines will depend on your age, health, and what vaccines you already had. Vaccines are very important because they can prevent certain serious or deadly infections.   Discussion of screening - \"Screening\" means checking for diseases or other health problems before they cause symptoms. Your doctor can recommend screening based on your age, risk, and preferences. This might include tests to check for:   Cancer, such as breast, prostate, cervical, ovarian, colorectal, prostate, lung, or skin cancer   Sexually transmitted infections, such as chlamydia and gonorrhea   Mental health conditions like depression and anxiety  Your doctor will talk to you about the different types of screening tests. They can help you decide which screenings to have. They can also explain what the results might mean.   Answering questions - The physical is a good time to ask the doctor or nurse questions about your health. If needed, they can refer you to other doctors or specialists, too.  Adults older than 65 years often need other care, too. As you get older, your doctor will talk to you about:   How to prevent falling at " home   Hearing or vision tests   Memory testing   How to take your medicines safely   Making sure that you have the help and support you need at home  All topics are updated as new evidence becomes available and our peer review process is complete.  This topic retrieved from [employer] on: May 02, 2024.  Topic 160917 Version 1.0  Release: 32.4.3 - C32.122  © 2024 UpToDate, Inc[employer] and/or its affiliates. All rights reserved.  Consumer Information Use and Disclaimer   Disclaimer: This generalized information is a limited summary of diagnosis, treatment, and/or medication information. It is not meant to be comprehensive and should be used as a tool to help the user understand and/or assess potential diagnostic and treatment options. It does NOT include all information about conditions, treatments, medications, side effects, or risks that may apply to a specific patient. It is not intended to be medical advice or a substitute for the medical advice, diagnosis, or treatment of a health care provider based on the health care provider's examination and assessment of a patient's specific and unique circumstances. Patients must speak with a health care provider for complete information about their health, medical questions, and treatment options, including any risks or benefits regarding use of medications. This information does not endorse any treatments or medications as safe, effective, or approved for treating a specific patient. Up[employer], Inc[employer] and its affiliates disclaim any warranty or liability relating to this information or the use thereof.The use of this information is governed by the Terms of Use, available at https://www.woltersInstahealthuwer.com/en/know/clinical-effectiveness-terms. 2024© Up[employer]Date, Inc. and its affiliates and/or licensors. All rights reserved.  Copyright   © 2024 [employer], [employer] and/or its affiliates. All rights reserved.

## 2025-06-24 ENCOUNTER — APPOINTMENT (OUTPATIENT)
Dept: LAB | Facility: CLINIC | Age: 45
End: 2025-06-24
Payer: COMMERCIAL

## 2025-06-24 DIAGNOSIS — L65.9 HAIR LOSS: ICD-10-CM

## 2025-06-24 DIAGNOSIS — Z00.00 ROUTINE HEALTH MAINTENANCE: ICD-10-CM

## 2025-06-24 DIAGNOSIS — N92.6 ABNORMAL MENSTRUAL PERIODS: ICD-10-CM

## 2025-06-24 LAB
BASOPHILS # BLD AUTO: 0.03 THOUSANDS/ÂΜL (ref 0–0.1)
BASOPHILS NFR BLD AUTO: 0 % (ref 0–1)
EOSINOPHIL # BLD AUTO: 0.1 THOUSAND/ÂΜL (ref 0–0.61)
EOSINOPHIL NFR BLD AUTO: 1 % (ref 0–6)
ERYTHROCYTE [DISTWIDTH] IN BLOOD BY AUTOMATED COUNT: 12.6 % (ref 11.6–15.1)
ESTRADIOL SERPL-MCNC: 23 PG/ML
FERRITIN SERPL-MCNC: 52 NG/ML (ref 30–307)
FSH SERPL-ACNC: 62.1 MIU/ML
HCT VFR BLD AUTO: 39.6 % (ref 34.8–46.1)
HGB BLD-MCNC: 13.3 G/DL (ref 11.5–15.4)
IMM GRANULOCYTES # BLD AUTO: 0.03 THOUSAND/UL (ref 0–0.2)
IMM GRANULOCYTES NFR BLD AUTO: 0 % (ref 0–2)
IRON SATN MFR SERPL: 15 % (ref 15–50)
IRON SERPL-MCNC: 54 UG/DL (ref 50–212)
LH SERPL-ACNC: 39.8 MIU/ML
LYMPHOCYTES # BLD AUTO: 1.93 THOUSANDS/ÂΜL (ref 0.6–4.47)
LYMPHOCYTES NFR BLD AUTO: 24 % (ref 14–44)
MCH RBC QN AUTO: 30 PG (ref 26.8–34.3)
MCHC RBC AUTO-ENTMCNC: 33.6 G/DL (ref 31.4–37.4)
MCV RBC AUTO: 89 FL (ref 82–98)
MONOCYTES # BLD AUTO: 0.67 THOUSAND/ÂΜL (ref 0.17–1.22)
MONOCYTES NFR BLD AUTO: 8 % (ref 4–12)
NEUTROPHILS # BLD AUTO: 5.37 THOUSANDS/ÂΜL (ref 1.85–7.62)
NEUTS SEG NFR BLD AUTO: 67 % (ref 43–75)
NRBC BLD AUTO-RTO: 0 /100 WBCS
PLATELET # BLD AUTO: 221 THOUSANDS/UL (ref 149–390)
PMV BLD AUTO: 10 FL (ref 8.9–12.7)
RBC # BLD AUTO: 4.43 MILLION/UL (ref 3.81–5.12)
TESTOST SERPL-MSCNC: 28 NG/DL (ref 0–75)
TIBC SERPL-MCNC: 348.6 UG/DL (ref 250–450)
TRANSFERRIN SERPL-MCNC: 249 MG/DL (ref 203–362)
TSH SERPL DL<=0.05 MIU/L-ACNC: 1.81 UIU/ML (ref 0.45–4.5)
UIBC SERPL-MCNC: 295 UG/DL (ref 155–355)
WBC # BLD AUTO: 8.13 THOUSAND/UL (ref 4.31–10.16)

## 2025-06-24 PROCEDURE — 85025 COMPLETE CBC W/AUTO DIFF WBC: CPT

## 2025-06-24 PROCEDURE — 84403 ASSAY OF TOTAL TESTOSTERONE: CPT

## 2025-06-24 PROCEDURE — 83540 ASSAY OF IRON: CPT

## 2025-06-24 PROCEDURE — 82670 ASSAY OF TOTAL ESTRADIOL: CPT

## 2025-06-24 PROCEDURE — 83002 ASSAY OF GONADOTROPIN (LH): CPT

## 2025-06-24 PROCEDURE — 84443 ASSAY THYROID STIM HORMONE: CPT

## 2025-06-24 PROCEDURE — 82728 ASSAY OF FERRITIN: CPT

## 2025-06-24 PROCEDURE — 83001 ASSAY OF GONADOTROPIN (FSH): CPT

## 2025-06-24 PROCEDURE — 83550 IRON BINDING TEST: CPT

## 2025-06-24 PROCEDURE — 82627 DEHYDROEPIANDROSTERONE: CPT

## 2025-06-24 PROCEDURE — 36415 COLL VENOUS BLD VENIPUNCTURE: CPT

## 2025-06-25 LAB — DHEA-S SERPL-MCNC: 133 UG/DL (ref 57.3–279.2)

## 2025-06-26 DIAGNOSIS — L65.9 HAIR LOSS: Primary | ICD-10-CM

## 2025-07-08 ENCOUNTER — OFFICE VISIT (OUTPATIENT)
Dept: FAMILY MEDICINE CLINIC | Facility: CLINIC | Age: 45
End: 2025-07-08
Payer: COMMERCIAL

## 2025-07-08 VITALS
DIASTOLIC BLOOD PRESSURE: 70 MMHG | HEART RATE: 74 BPM | SYSTOLIC BLOOD PRESSURE: 110 MMHG | BODY MASS INDEX: 21.82 KG/M2 | WEIGHT: 144 LBS | OXYGEN SATURATION: 100 % | HEIGHT: 68 IN | TEMPERATURE: 101.4 F

## 2025-07-08 DIAGNOSIS — J06.9 VIRAL URI WITH COUGH: Primary | ICD-10-CM

## 2025-07-08 LAB
SARS-COV-2 AG UPPER RESP QL IA: NEGATIVE
SL AMB POCT RAPID FLU A: NORMAL
SL AMB POCT RAPID FLU B: NORMAL
VALID CONTROL: NORMAL

## 2025-07-08 PROCEDURE — 87804 INFLUENZA ASSAY W/OPTIC: CPT

## 2025-07-08 PROCEDURE — 99213 OFFICE O/P EST LOW 20 MIN: CPT

## 2025-07-08 PROCEDURE — 87811 SARS-COV-2 COVID19 W/OPTIC: CPT

## 2025-07-08 RX ORDER — BENZONATATE 200 MG/1
200 CAPSULE ORAL 3 TIMES DAILY PRN
Qty: 30 CAPSULE | Refills: 0 | Status: SHIPPED | OUTPATIENT
Start: 2025-07-08 | End: 2025-07-18

## 2025-07-08 NOTE — PROGRESS NOTES
"Name: Sally Coleman      : 1980      MRN: [id number]  Encounter Provider: Tray Vyas PA-C  Encounter Date: 2025   Encounter department: [facility]    Assessment & Plan  Viral URI with cough  Pt complains of sx including fevers up to 101 degrees, dry cough, productive cough, post nasal drip, sinus pressure, chest congestion, chills, and myalgias  Sx have been present for 3 day(s) and is unchanged since onset.   Pt has attempted to alleviate their current sx with nyquil which has provided some relief  Covid flu negative in office  Pertinent negatives: no chest pain, SOB, difficulty breathing, vomiting, or diarrhea . SHx is significant for exposure pneumonia in family  Exam normal, lungs CTA, no evidence of bacterial infection  Suspect viral cause, advised supportive care and continued monitoring  After discussing risks, benefits, & AE of tessalon perles, and using shared decision making, will start  F/u prn   Orders:    benzonatate (TESSALON) 200 MG capsule; Take 1 capsule (200 mg total) by mouth 3 (three) times a day as needed for cough for up to 10 days    POCT rapid flu A and B    POCT Rapid Covid Ag         History of Present Illness     Sally Coleman is a 44 y.o. female  presenting for sx of cough and congestion        **Note: Portions of the record may have been created with voice recognition software.  Occasional wrong word or \"sound alike\" substitutions may have occurred due to the inherent limitations of voice recognition software.  Please read the chart carefully and recognize, using context, where substitutions have occurred. Please contact for further clarification, when necessary.       Review of Systems   Constitutional:  Positive for fatigue and fever. Negative for chills.   HENT:  Positive for postnasal drip, rhinorrhea and sinus pressure. Negative for ear pain and sore throat.    Eyes:  Negative for pain and visual disturbance.   Respiratory:  " "Positive for cough. Negative for shortness of breath.    Cardiovascular:  Negative for chest pain and palpitations.   Gastrointestinal:  Negative for abdominal pain, constipation, diarrhea, nausea and vomiting.   Genitourinary:  Negative for dysuria and hematuria.   Musculoskeletal:  Negative for arthralgias and back pain.   Skin:  Negative for color change and rash.   Neurological:  Negative for seizures and syncope.   All other systems reviewed and are negative.    Past Medical History[1]  Past Surgical History[2]  Family History[3]  Social History[4]  Medications[5]  Allergies   Allergen Reactions    Hydrocodone Anaphylaxis    Morphine Palpitations    Morphine And Codeine Anaphylaxis and Palpitations    Oxycodone Anaphylaxis    Penicillins Fever and Other (See Comments)    Clomid [Clomiphene]     Sulfa Antibiotics Fever     Immunization History   Administered Date(s) Administered    COVID-19 MODERNA VACC 0.5 ML IM 03/29/2021, 04/26/2021    INFLUENZA 10/31/2018, 10/18/2020, 11/01/2023    Influenza Quadrivalent 3 years and older 10/31/2018    Tdap 01/01/2015, 06/23/2025     Objective   /70   Pulse 74   Temp (!) 101.4 °F (38.6 °C)   Ht 5' 8\" (1.727 m)   Wt 65.3 kg (144 lb)   SpO2 100%   BMI 21.90 kg/m²     Physical Exam  Vitals and nursing note reviewed.   Constitutional:       General: She is not in acute distress.     Appearance: She is well-developed. She is not ill-appearing.   HENT:      Head: Normocephalic and atraumatic.      Right Ear: Tympanic membrane normal.      Left Ear: Tympanic membrane normal.      Nose: Rhinorrhea present.      Mouth/Throat:      Pharynx: Oropharynx is clear.     Eyes:      Conjunctiva/sclera: Conjunctivae normal.       Cardiovascular:      Rate and Rhythm: Normal rate and regular rhythm.      Heart sounds: No murmur heard.  Pulmonary:      Effort: Pulmonary effort is normal. No respiratory distress.      Breath sounds: Normal breath sounds. No stridor. No wheezing, " rhonchi or rales.   Abdominal:      Palpations: Abdomen is soft.      Tenderness: There is no abdominal tenderness.     Musculoskeletal:         General: No swelling.      Cervical back: Neck supple.     Skin:     General: Skin is warm and dry.     Neurological:      Mental Status: She is alert and oriented to person, place, and time.     Psychiatric:         Mood and Affect: Mood normal.                [1]   Past Medical History:  Diagnosis Date    Abnormal Pap smear of cervix     Female infertility     Migraine     Ovarian cyst     Palpitations     Renal disorder     acute kidney injury    Tachycardia     Uterine fibroid     Uterine leiomyoma     Last assessed 8/28/2017   [2]   Past Surgical History:  Procedure Laterality Date    APPENDECTOMY      CERVICAL BIOPSY  W/ LOOP ELECTRODE EXCISION      Last assessed 8/28/2017    COLONOSCOPY      COLONOSCOPY WITH FECAL TRANSPLANT      COLPOSCOPY      DENTAL SURGERY      DILATION AND CURETTAGE OF UTERUS      Last assessed 8/28/2017    ESOPHAGOGASTRODUODENOSCOPY      OVARIAN CYST REMOVAL      Last assessed 8/28/2017    TONSILLECTOMY     [3]   Family History  Problem Relation Name Age of Onset    Other Mother Umu         Cardiac Disorder    Heart attack Mother Umu     Arrhythmia Mother Umu     Coronary artery disease Mother Umu     Hyperlipidemia Mother Umu     Diabetes Mother Umu     Hypertension Father Abdulkadir     Clotting disorder Father Abdulkadir     Abdominal aortic aneurysm Father Abdulkadir     Deep vein thrombosis Father Abdulkadir     No Known Problems Maternal Grandmother      No Known Problems Maternal Grandfather      Heart failure Paternal Grandmother Sinai     No Known Problems Paternal Grandfather      No Known Problems Half-Sister      No Known Problems Half-Sister      No Known Problems Maternal Aunt      No Known Problems Paternal Aunt      No Known Problems Paternal Aunt      Stroke Neg Hx      Cancer Neg Hx      Colon cancer Neg Hx      Breast cancer Neg Hx    "   Ovarian cancer Neg Hx      Breast cancer additional onset Neg Hx      Endometrial cancer Neg Hx      BRCA1 Negative Neg Hx      BRCA2 Negative Neg Hx      BRCA2 Positive Neg Hx      BRCA1 Positive Neg Hx      BRCA 1/2 Neg Hx      Aortic aneurysm Neg Hx     [4]   Social History  Tobacco Use    Smoking status: Never    Smokeless tobacco: Never   Vaping Use    Vaping status: Never Used   Substance and Sexual Activity    Alcohol use: Not Currently     Alcohol/week: 1.0 standard drink of alcohol     Types: 1 Glasses of wine per week     Comment: socially    Drug use: No    Sexual activity: Yes     Partners: Male     Birth control/protection: OCP   [5]   Current Outpatient Medications on File Prior to Visit   Medication Sig    atorvastatin (LIPITOR) 20 mg tablet Take 1 tablet (20 mg total) by mouth daily    cyanocobalamin 1,000 mcg/mL Inject 1 mL (1,000 mcg total) into a muscle once a week    EPINEPHrine (EPIPEN) 0.3 mg/0.3 mL SOAJ Inject 0.3 mL (0.3 mg total) into a muscle once for 1 dose    Setlakin 0.15-0.03 MG per tablet take 1 tablet by mouth once daily    SUMAtriptan (Imitrex) 100 mg tablet Take 1 tablet (100 mg total) by mouth once as needed for migraine Up to 2 doses per migraine as needed    SYRINGE-NEEDLE, DISP, 3 ML (B-D SYRINGE/NEEDLE 3CC/25GX5/8) 25G X 5/8\" 3 ML MISC Use once a week    [DISCONTINUED] cyclobenzaprine (FLEXERIL) 5 mg tablet Take 1 tablet (5 mg total) by mouth daily at bedtime (Patient not taking: Reported on 10/15/2024)     "

## 2025-07-08 NOTE — LETTER
July 8, 2025     Patient: Sally Coleman  YOB: 1980  Date of Visit: 7/8/2025      To Whom it May Concern:    Sally Coleman is under my professional care. Sally was seen in my office on 7/8/2025. Sally may return to work on 7/11/25, please excuse her from missed work starting 7/6/25.    If you have any questions or concerns, please don't hesitate to call.         Sincerely,          Tray Vyas PA-C        CC: No Recipients

## 2025-07-15 ENCOUNTER — APPOINTMENT (OUTPATIENT)
Dept: RADIOLOGY | Facility: CLINIC | Age: 45
End: 2025-07-15
Attending: PHYSICIAN ASSISTANT
Payer: COMMERCIAL

## 2025-07-15 ENCOUNTER — OFFICE VISIT (OUTPATIENT)
Dept: URGENT CARE | Facility: CLINIC | Age: 45
End: 2025-07-15
Payer: COMMERCIAL

## 2025-07-15 VITALS
OXYGEN SATURATION: 99 % | SYSTOLIC BLOOD PRESSURE: 132 MMHG | HEIGHT: 68 IN | HEART RATE: 97 BPM | TEMPERATURE: 98.5 F | DIASTOLIC BLOOD PRESSURE: 74 MMHG | BODY MASS INDEX: 21.82 KG/M2 | WEIGHT: 144 LBS | RESPIRATION RATE: 18 BRPM

## 2025-07-15 DIAGNOSIS — R68.89 FLU-LIKE SYMPTOMS: ICD-10-CM

## 2025-07-15 DIAGNOSIS — J18.9 COMMUNITY ACQUIRED PNEUMONIA OF RIGHT MIDDLE LOBE OF LUNG: Primary | ICD-10-CM

## 2025-07-15 PROCEDURE — 71046 X-RAY EXAM CHEST 2 VIEWS: CPT

## 2025-07-15 PROCEDURE — 99204 OFFICE O/P NEW MOD 45 MIN: CPT | Performed by: PHYSICIAN ASSISTANT

## 2025-07-15 RX ORDER — AZITHROMYCIN 250 MG/1
TABLET, FILM COATED ORAL
Qty: 6 TABLET | Refills: 0 | Status: SHIPPED | OUTPATIENT
Start: 2025-07-15 | End: 2025-07-19

## 2025-07-15 RX ORDER — PREDNISONE 10 MG/1
10 TABLET ORAL DAILY
Qty: 21 TABLET | Refills: 0 | Status: SHIPPED | OUTPATIENT
Start: 2025-07-15

## 2025-07-30 DIAGNOSIS — T36.95XA ANTIBIOTIC-INDUCED YEAST INFECTION: ICD-10-CM

## 2025-07-30 DIAGNOSIS — B37.9 ANTIBIOTIC-INDUCED YEAST INFECTION: ICD-10-CM

## 2025-07-30 DIAGNOSIS — B37.0 THRUSH: Primary | ICD-10-CM

## 2025-07-30 RX ORDER — FLUCONAZOLE 200 MG/1
200 TABLET ORAL DAILY
Qty: 7 TABLET | Refills: 0 | Status: SHIPPED | OUTPATIENT
Start: 2025-07-30 | End: 2025-08-06